# Patient Record
Sex: FEMALE | Race: WHITE | NOT HISPANIC OR LATINO | Employment: FULL TIME | URBAN - METROPOLITAN AREA
[De-identification: names, ages, dates, MRNs, and addresses within clinical notes are randomized per-mention and may not be internally consistent; named-entity substitution may affect disease eponyms.]

---

## 2018-01-10 ENCOUNTER — HOSPITAL ENCOUNTER (OUTPATIENT)
Dept: PREADMISSION TESTING | Facility: OTHER | Age: 48
Discharge: HOME | End: 2018-01-10
Attending: ORTHOPAEDIC SURGERY
Payer: COMMERCIAL

## 2018-01-10 ENCOUNTER — ANESTHESIA EVENT (OUTPATIENT)
Dept: SURGERY | Facility: OTHER | Age: 48
End: 2018-01-10
Payer: COMMERCIAL

## 2018-01-10 VITALS
BODY MASS INDEX: 33.34 KG/M2 | WEIGHT: 220 LBS | HEIGHT: 68 IN | DIASTOLIC BLOOD PRESSURE: 78 MMHG | TEMPERATURE: 98 F | SYSTOLIC BLOOD PRESSURE: 115 MMHG | OXYGEN SATURATION: 98 % | HEART RATE: 63 BPM

## 2018-01-10 RX ORDER — OXYCODONE AND ACETAMINOPHEN 5; 325 MG/1; MG/1
1 TABLET ORAL EVERY 4 HOURS PRN
Status: ON HOLD | COMMUNITY
End: 2018-01-12 | Stop reason: HOSPADM

## 2018-01-10 RX ORDER — ESCITALOPRAM OXALATE 20 MG/1
20 TABLET ORAL DAILY
COMMUNITY
End: 2018-11-13 | Stop reason: SDUPTHER

## 2018-01-10 RX ORDER — LISDEXAMFETAMINE DIMESYLATE 30 MG/1
30 CAPSULE ORAL EVERY MORNING
COMMUNITY
End: 2018-12-11

## 2018-01-10 RX ORDER — OXYCODONE HYDROCHLORIDE 5 MG/1
5 TABLET ORAL ONCE AS NEEDED
Status: CANCELLED | OUTPATIENT
Start: 2018-01-10 | End: 2018-01-10

## 2018-01-10 RX ORDER — SODIUM CHLORIDE, SODIUM LACTATE, POTASSIUM CHLORIDE, CALCIUM CHLORIDE 600; 310; 30; 20 MG/100ML; MG/100ML; MG/100ML; MG/100ML
INJECTION, SOLUTION INTRAVENOUS CONTINUOUS
Status: CANCELLED | OUTPATIENT
Start: 2018-01-10

## 2018-01-10 RX ORDER — FAMOTIDINE 20 MG/1
20 TABLET, FILM COATED ORAL
Status: CANCELLED | OUTPATIENT
Start: 2018-01-10 | End: 2018-01-10

## 2018-01-10 RX ORDER — LIDOCAINE HYDROCHLORIDE 10 MG/ML
0.5 INJECTION, SOLUTION EPIDURAL; INFILTRATION; INTRACAUDAL; PERINEURAL ONCE
Status: CANCELLED | OUTPATIENT
Start: 2018-01-10 | End: 2018-01-10

## 2018-01-10 RX ORDER — PREGABALIN 75 MG/1
150 CAPSULE ORAL ONCE
Status: DISCONTINUED | OUTPATIENT
Start: 2018-01-12 | End: 2018-01-11 | Stop reason: HOSPADM

## 2018-01-10 RX ORDER — MIDAZOLAM HYDROCHLORIDE 5 MG/ML
4 INJECTION INTRAMUSCULAR; INTRAVENOUS
Status: CANCELLED | OUTPATIENT
Start: 2018-01-10

## 2018-01-10 NOTE — ANESTHESIA PREPROCEDURE EVALUATION
01/10/2018  Hayley Montana is a 47 y.o., female.    Anesthesia Evaluation    I have reviewed the Patient Summary Reports.    I have reviewed the Nursing Notes.   I have reviewed the Medications.     Review of Systems  Anesthesia Hx:  No problems with previous Anesthesia Denies Hx of Anesthetic complications  Denies Family Hx of Anesthesia complications.   Denies Personal Hx of Anesthesia complications.   Social:  Non-Smoker    Hematology/Oncology:  Hematology Normal   Oncology Normal     EENT/Dental:EENT/Dental Normal   Cardiovascular:  Cardiovascular Normal     Pulmonary:  Pulmonary Normal    Renal/:  Renal/ Normal     Hepatic/GI:  Hepatic/GI Normal    Musculoskeletal:  Musculoskeletal Normal    Neurological:  Neurology Normal    Endocrine:  Endocrine Normal    Dermatological:  Skin Normal    Psych:  Psychiatric Normal           Physical Exam  General:  Well nourished    Airway/Jaw/Neck:  Airway Findings: Mouth Opening: Normal Mallampati: II      Dental:  Dental Findings: In tact        Mental Status:  Mental Status Findings:  Cooperative, Alert and Oriented         Anesthesia Plan  Type of Anesthesia, risks & benefits discussed:  Anesthesia Type:  general  Patient's Preference:   Intra-op Monitoring Plan: standard ASA monitors  Intra-op Monitoring Plan Comments:   Post Op Pain Control Plan: multimodal analgesia and peripheral nerve block  Post Op Pain Control Plan Comments:   Induction:   IV  Beta Blocker:         Informed Consent: Patient understands risks and agrees with Anesthesia plan.  Questions answered. Anesthesia consent signed with patient.  ASA Score: 1     Day of Surgery Review of History & Physical:    H&P update referred to the surgeon.     Anesthesia Plan Notes: Peripheral nerve block for post op pain management discussed        Ready For Surgery From Anesthesia Perspective.

## 2018-01-10 NOTE — DISCHARGE INSTRUCTIONS
PRE-ADMIT TESTING -  [phone]    [address]  [facility]          Your surgery has been scheduled at [facility]. We are pleased to have the opportunity to serve you. For Further Information please call [phone].    On the day of surgery please report to the Information Desk on the 1st floor.    · CONTACT YOUR PHYSICIAN'S OFFICE THE DAY PRIOR TO YOUR SURGERY TO OBTAIN YOUR ARRIVAL TIME.     · The evening before surgery do not eat anything after 9 p.m. ( this includes hard candy, chewing gum and mints).  You may only have GATORADE, POWERADE AND WATER  from 9 p.m. until you leave your home.   DO NOT DRINK ANY LIQUIDS ON THE WAY TO THE HOSPITAL.      SPECIAL MEDICATION INSTRUCTIONS: TAKE medications checked off by the Anesthesiologist on your Medication List.    Angiogram Patients: Take medications as instructed by your physician, including aspirin.     Surgery Patients:    If you take ASPIRIN - Your PHYSICIAN/SURGEON will need to inform you IF/OR when you need to stop taking aspirin prior to your surgery.     Do Not take any medications containing IBUPROFEN.  Do Not Wear any make-up or dark nail polish   (especially eye make-up) to surgery. If you come to surgery with makeup on you will be required to remove the makeup or nail polish.    Do not shave your surgical area at least 5 days prior to your surgery. The surgical prep will be performed at the hospital according to Infection Control regulations.    Leave all valuables at home.   Do Not wear any jewelry or watches, including any metal in body piercings.  Contact Lens must be removed before surgery. Either do not wear the contact lens or bring a case and solution for storage.  Please bring a container for eyeglasses or dentures as required.  Bring any paperwork your physician has provided, such as consent forms,  history and physicals, doctor's orders, etc.   Bring comfortable clothes that are loose fitting to wear upon  discharge. Take into consideration the type of surgery being performed.  Maintain your diet as advised per your physician the day prior to surgery.      Adequate rest the night before surgery is advised.   Park in the Parking lot behind the hospital or in the [place] [facility] across the street from the parking lot. Parking is complimentary.  If you will be discharged the same day as your procedure, please arrange for a responsible adult to drive you home or to accompany you if traveling by taxi.   YOU WILL NOT BE PERMITTED TO DRIVE OR TO LEAVE THE HOSPITAL ALONE AFTER SURGERY.   It is strongly recommended that you arrange for someone to remain with you for the first 24 hrs following your surgery.       Thank you for your cooperation.  The Staff of [facility].        Bathing Instructions                                                                 Please shower the evening before and morning of your procedure with    ANTIBACTERIAL SOAP. ( DIAL, etc )  Concentrate on the surgical area   for at least 3 minutes and rinse completely. Dry off as usual.   Do not use any deodorant, powder, body lotions, perfume, after shave or    cologne.

## 2018-01-12 ENCOUNTER — ANESTHESIA (OUTPATIENT)
Dept: SURGERY | Facility: OTHER | Age: 48
End: 2018-01-12
Payer: COMMERCIAL

## 2018-01-12 ENCOUNTER — HOSPITAL ENCOUNTER (OUTPATIENT)
Facility: OTHER | Age: 48
Discharge: HOME | End: 2018-01-12
Attending: ORTHOPAEDIC SURGERY | Admitting: ORTHOPAEDIC SURGERY
Payer: COMMERCIAL

## 2018-01-12 ENCOUNTER — SURGERY (OUTPATIENT)
Age: 48
End: 2018-01-12

## 2018-01-12 VITALS
SYSTOLIC BLOOD PRESSURE: 118 MMHG | BODY MASS INDEX: 33.34 KG/M2 | HEIGHT: 68 IN | OXYGEN SATURATION: 98 % | DIASTOLIC BLOOD PRESSURE: 80 MMHG | TEMPERATURE: 98 F | HEART RATE: 74 BPM | RESPIRATION RATE: 18 BRPM | WEIGHT: 220 LBS

## 2018-01-12 DIAGNOSIS — M75.121 COMPLETE ROTATOR CUFF TEAR OR RUPTURE OF RIGHT SHOULDER, NOT SPECIFIED AS TRAUMATIC: Primary | ICD-10-CM

## 2018-01-12 PROCEDURE — C1889 IMPLANT/INSERT DEVICE, NOC: HCPCS | Performed by: ORTHOPAEDIC SURGERY

## 2018-01-12 PROCEDURE — C1713 ANCHOR/SCREW BN/BN,TIS/BN: HCPCS | Performed by: ORTHOPAEDIC SURGERY

## 2018-01-12 PROCEDURE — 25000003 PHARM REV CODE 250: Performed by: ANESTHESIOLOGY

## 2018-01-12 PROCEDURE — C1729 CATH, DRAINAGE: HCPCS | Performed by: ORTHOPAEDIC SURGERY

## 2018-01-12 PROCEDURE — 37000008 HC ANESTHESIA 1ST 15 MINUTES: Performed by: ORTHOPAEDIC SURGERY

## 2018-01-12 PROCEDURE — 63600175 PHARM REV CODE 636 W HCPCS: Performed by: ANESTHESIOLOGY

## 2018-01-12 PROCEDURE — 71000015 HC POSTOP RECOV 1ST HR: Performed by: ORTHOPAEDIC SURGERY

## 2018-01-12 PROCEDURE — 71000033 HC RECOVERY, INTIAL HOUR: Performed by: ORTHOPAEDIC SURGERY

## 2018-01-12 PROCEDURE — 37000009 HC ANESTHESIA EA ADD 15 MINS: Performed by: ORTHOPAEDIC SURGERY

## 2018-01-12 PROCEDURE — 63600175 PHARM REV CODE 636 W HCPCS: Performed by: ORTHOPAEDIC SURGERY

## 2018-01-12 PROCEDURE — S0077 INJECTION, CLINDAMYCIN PHOSP: HCPCS | Performed by: ORTHOPAEDIC SURGERY

## 2018-01-12 PROCEDURE — 25000003 PHARM REV CODE 250: Performed by: ORTHOPAEDIC SURGERY

## 2018-01-12 PROCEDURE — 63600175 PHARM REV CODE 636 W HCPCS: Performed by: NURSE ANESTHETIST, CERTIFIED REGISTERED

## 2018-01-12 PROCEDURE — 36000710: Performed by: ORTHOPAEDIC SURGERY

## 2018-01-12 PROCEDURE — 25000003 PHARM REV CODE 250: Performed by: NURSE ANESTHETIST, CERTIFIED REGISTERED

## 2018-01-12 PROCEDURE — 36000711: Performed by: ORTHOPAEDIC SURGERY

## 2018-01-12 PROCEDURE — 71000039 HC RECOVERY, EACH ADD'L HOUR: Performed by: ORTHOPAEDIC SURGERY

## 2018-01-12 PROCEDURE — 27201423 OPTIME MED/SURG SUP & DEVICES STERILE SUPPLY: Performed by: ORTHOPAEDIC SURGERY

## 2018-01-12 DEVICE — IMPLANT ARTHSCP BIOINDUCTV MED: Type: IMPLANTABLE DEVICE | Site: SHOULDER | Status: FUNCTIONAL

## 2018-01-12 DEVICE — KIT STAPLES BONE AND TENDON: Type: IMPLANTABLE DEVICE | Site: SHOULDER | Status: FUNCTIONAL

## 2018-01-12 DEVICE — ANCHOR SUT PEEK 4.75X19.1MM: Type: IMPLANTABLE DEVICE | Site: SHOULDER | Status: FUNCTIONAL

## 2018-01-12 DEVICE — ANCHOR 4.5 BIO COM TT: Type: IMPLANTABLE DEVICE | Site: SHOULDER | Status: FUNCTIONAL

## 2018-01-12 DEVICE — ANCHOR BIOCOMP W/3 SUTURES: Type: IMPLANTABLE DEVICE | Site: SHOULDER | Status: FUNCTIONAL

## 2018-01-12 RX ORDER — CLINDAMYCIN PHOSPHATE 900 MG/50ML
900 INJECTION, SOLUTION INTRAVENOUS
Status: COMPLETED | OUTPATIENT
Start: 2018-01-12 | End: 2018-01-12

## 2018-01-12 RX ORDER — LIDOCAINE HCL/PF 100 MG/5ML
SYRINGE (ML) INTRAVENOUS
Status: DISCONTINUED | OUTPATIENT
Start: 2018-01-12 | End: 2018-01-12

## 2018-01-12 RX ORDER — PHENYLEPHRINE HYDROCHLORIDE 10 MG/ML
INJECTION INTRAVENOUS
Status: DISCONTINUED | OUTPATIENT
Start: 2018-01-12 | End: 2018-01-12

## 2018-01-12 RX ORDER — ONDANSETRON 4 MG/1
8 TABLET, ORALLY DISINTEGRATING ORAL EVERY 8 HOURS PRN
Qty: 10 TABLET | Refills: 1 | Status: SHIPPED | OUTPATIENT
Start: 2018-01-12 | End: 2018-03-26 | Stop reason: CLARIF

## 2018-01-12 RX ORDER — MIDAZOLAM HYDROCHLORIDE 5 MG/ML
4 INJECTION INTRAMUSCULAR; INTRAVENOUS
Status: DISCONTINUED | OUTPATIENT
Start: 2018-01-12 | End: 2018-01-12 | Stop reason: HOSPADM

## 2018-01-12 RX ORDER — HYDROMORPHONE HYDROCHLORIDE 2 MG/ML
0.4 INJECTION, SOLUTION INTRAMUSCULAR; INTRAVENOUS; SUBCUTANEOUS EVERY 5 MIN PRN
Status: DISCONTINUED | OUTPATIENT
Start: 2018-01-12 | End: 2018-01-12 | Stop reason: HOSPADM

## 2018-01-12 RX ORDER — FENTANYL CITRATE 50 UG/ML
25 INJECTION, SOLUTION INTRAMUSCULAR; INTRAVENOUS EVERY 5 MIN PRN
Status: DISCONTINUED | OUTPATIENT
Start: 2018-01-12 | End: 2018-01-12 | Stop reason: HOSPADM

## 2018-01-12 RX ORDER — OXYCODONE HYDROCHLORIDE 5 MG/1
5 TABLET ORAL
Status: DISCONTINUED | OUTPATIENT
Start: 2018-01-12 | End: 2018-01-12 | Stop reason: HOSPADM

## 2018-01-12 RX ORDER — FENTANYL CITRATE 50 UG/ML
100 INJECTION, SOLUTION INTRAMUSCULAR; INTRAVENOUS EVERY 5 MIN PRN
Status: DISCONTINUED | OUTPATIENT
Start: 2018-01-12 | End: 2018-01-12 | Stop reason: HOSPADM

## 2018-01-12 RX ORDER — ACETAMINOPHEN 10 MG/ML
INJECTION, SOLUTION INTRAVENOUS
Status: DISCONTINUED | OUTPATIENT
Start: 2018-01-12 | End: 2018-01-12

## 2018-01-12 RX ORDER — MEPERIDINE HYDROCHLORIDE 50 MG/ML
12.5 INJECTION INTRAMUSCULAR; INTRAVENOUS; SUBCUTANEOUS ONCE AS NEEDED
Status: COMPLETED | OUTPATIENT
Start: 2018-01-12 | End: 2018-01-12

## 2018-01-12 RX ORDER — SODIUM CHLORIDE, SODIUM LACTATE, POTASSIUM CHLORIDE, CALCIUM CHLORIDE 600; 310; 30; 20 MG/100ML; MG/100ML; MG/100ML; MG/100ML
INJECTION, SOLUTION INTRAVENOUS CONTINUOUS
Status: DISCONTINUED | OUTPATIENT
Start: 2018-01-12 | End: 2018-01-12 | Stop reason: HOSPADM

## 2018-01-12 RX ORDER — LIDOCAINE HYDROCHLORIDE 10 MG/ML
0.5 INJECTION, SOLUTION EPIDURAL; INFILTRATION; INTRACAUDAL; PERINEURAL ONCE
Status: DISCONTINUED | OUTPATIENT
Start: 2018-01-12 | End: 2018-01-12 | Stop reason: HOSPADM

## 2018-01-12 RX ORDER — FAMOTIDINE 20 MG/1
20 TABLET, FILM COATED ORAL
Status: COMPLETED | OUTPATIENT
Start: 2018-01-12 | End: 2018-01-12

## 2018-01-12 RX ORDER — SODIUM CHLORIDE 0.9 % (FLUSH) 0.9 %
3 SYRINGE (ML) INJECTION
Status: DISCONTINUED | OUTPATIENT
Start: 2018-01-12 | End: 2018-01-12 | Stop reason: HOSPADM

## 2018-01-12 RX ORDER — ONDANSETRON 2 MG/ML
4 INJECTION INTRAMUSCULAR; INTRAVENOUS DAILY PRN
Status: DISCONTINUED | OUTPATIENT
Start: 2018-01-12 | End: 2018-01-12 | Stop reason: HOSPADM

## 2018-01-12 RX ORDER — OXYCODONE HYDROCHLORIDE 5 MG/1
10 TABLET ORAL EVERY 4 HOURS PRN
Status: DISCONTINUED | OUTPATIENT
Start: 2018-01-12 | End: 2018-01-12 | Stop reason: HOSPADM

## 2018-01-12 RX ORDER — ONDANSETRON 2 MG/ML
4 INJECTION INTRAMUSCULAR; INTRAVENOUS EVERY 12 HOURS PRN
Status: DISCONTINUED | OUTPATIENT
Start: 2018-01-12 | End: 2018-01-12 | Stop reason: HOSPADM

## 2018-01-12 RX ORDER — EPINEPHRINE 1 MG/ML
INJECTION, SOLUTION INTRACARDIAC; INTRAMUSCULAR; INTRAVENOUS; SUBCUTANEOUS
Status: DISCONTINUED | OUTPATIENT
Start: 2018-01-12 | End: 2018-01-12 | Stop reason: HOSPADM

## 2018-01-12 RX ORDER — OXYCODONE AND ACETAMINOPHEN 10; 325 MG/1; MG/1
1 TABLET ORAL
Qty: 50 TABLET | Refills: 0 | Status: SHIPPED | OUTPATIENT
Start: 2018-01-12 | End: 2018-11-07

## 2018-01-12 RX ORDER — HYDROCODONE BITARTRATE AND ACETAMINOPHEN 5; 325 MG/1; MG/1
1 TABLET ORAL EVERY 4 HOURS PRN
Status: DISCONTINUED | OUTPATIENT
Start: 2018-01-12 | End: 2018-01-12 | Stop reason: HOSPADM

## 2018-01-12 RX ORDER — PROPOFOL 10 MG/ML
VIAL (ML) INTRAVENOUS
Status: DISCONTINUED | OUTPATIENT
Start: 2018-01-12 | End: 2018-01-12

## 2018-01-12 RX ORDER — MIDAZOLAM HYDROCHLORIDE 1 MG/ML
2 INJECTION INTRAMUSCULAR; INTRAVENOUS EVERY 5 MIN PRN
Status: DISCONTINUED | OUTPATIENT
Start: 2018-01-12 | End: 2018-01-12 | Stop reason: HOSPADM

## 2018-01-12 RX ORDER — ROPIVACAINE HYDROCHLORIDE 5 MG/ML
INJECTION, SOLUTION EPIDURAL; INFILTRATION; PERINEURAL
Status: DISCONTINUED | OUTPATIENT
Start: 2018-01-12 | End: 2018-01-12

## 2018-01-12 RX ORDER — ROCURONIUM BROMIDE 10 MG/ML
INJECTION, SOLUTION INTRAVENOUS
Status: DISCONTINUED | OUTPATIENT
Start: 2018-01-12 | End: 2018-01-12

## 2018-01-12 RX ORDER — DEXAMETHASONE SODIUM PHOSPHATE 4 MG/ML
INJECTION, SOLUTION INTRA-ARTICULAR; INTRALESIONAL; INTRAMUSCULAR; INTRAVENOUS; SOFT TISSUE
Status: DISCONTINUED | OUTPATIENT
Start: 2018-01-12 | End: 2018-01-12

## 2018-01-12 RX ORDER — OXYCODONE HYDROCHLORIDE 5 MG/1
5 TABLET ORAL ONCE AS NEEDED
Status: DISCONTINUED | OUTPATIENT
Start: 2018-01-12 | End: 2018-01-12 | Stop reason: HOSPADM

## 2018-01-12 RX ORDER — FENTANYL CITRATE 50 UG/ML
100 INJECTION, SOLUTION INTRAMUSCULAR; INTRAVENOUS EVERY 5 MIN PRN
Status: COMPLETED | OUTPATIENT
Start: 2018-01-12 | End: 2018-01-12

## 2018-01-12 RX ORDER — MEPERIDINE HYDROCHLORIDE 50 MG/ML
12.5 INJECTION INTRAMUSCULAR; INTRAVENOUS; SUBCUTANEOUS ONCE AS NEEDED
Status: DISCONTINUED | OUTPATIENT
Start: 2018-01-12 | End: 2018-01-12 | Stop reason: HOSPADM

## 2018-01-12 RX ORDER — SODIUM CHLORIDE 0.9 % (FLUSH) 0.9 %
5 SYRINGE (ML) INJECTION
Status: DISCONTINUED | OUTPATIENT
Start: 2018-01-12 | End: 2018-01-12 | Stop reason: HOSPADM

## 2018-01-12 RX ADMIN — LIDOCAINE HYDROCHLORIDE 25 MG: 20 INJECTION, SOLUTION INTRAVENOUS at 12:01

## 2018-01-12 RX ADMIN — PHENYLEPHRINE HYDROCHLORIDE 100 MCG: 10 INJECTION INTRAVENOUS at 12:01

## 2018-01-12 RX ADMIN — PROPOFOL 200 MG: 10 INJECTION, EMULSION INTRAVENOUS at 10:01

## 2018-01-12 RX ADMIN — CLINDAMYCIN PHOSPHATE 900 MG: 18 INJECTION, SOLUTION INTRAVENOUS at 10:01

## 2018-01-12 RX ADMIN — PHENYLEPHRINE HYDROCHLORIDE 100 MCG: 10 INJECTION INTRAVENOUS at 11:01

## 2018-01-12 RX ADMIN — MEPERIDINE HYDROCHLORIDE 12.5 MG: 50 INJECTION INTRAMUSCULAR; INTRAVENOUS; SUBCUTANEOUS at 01:01

## 2018-01-12 RX ADMIN — FAMOTIDINE 20 MG: 20 TABLET, FILM COATED ORAL at 07:01

## 2018-01-12 RX ADMIN — ROPIVACAINE HYDROCHLORIDE 20 ML: 5 INJECTION, SOLUTION EPIDURAL; INFILTRATION; PERINEURAL at 10:01

## 2018-01-12 RX ADMIN — CARBOXYMETHYLCELLULOSE SODIUM 2 DROP: 2.5 SOLUTION/ DROPS OPHTHALMIC at 10:01

## 2018-01-12 RX ADMIN — PHENYLEPHRINE HYDROCHLORIDE 200 MCG: 10 INJECTION INTRAVENOUS at 11:01

## 2018-01-12 RX ADMIN — MIDAZOLAM HYDROCHLORIDE 2 MG: 1 INJECTION INTRAMUSCULAR; INTRAVENOUS at 10:01

## 2018-01-12 RX ADMIN — ACETAMINOPHEN 1000 MG: 10 INJECTION, SOLUTION INTRAVENOUS at 10:01

## 2018-01-12 RX ADMIN — PHENYLEPHRINE HYDROCHLORIDE 200 MCG: 10 INJECTION INTRAVENOUS at 10:01

## 2018-01-12 RX ADMIN — MIDAZOLAM HYDROCHLORIDE 4 MG: 5 INJECTION, SOLUTION INTRAMUSCULAR; INTRAVENOUS at 07:01

## 2018-01-12 RX ADMIN — SODIUM CHLORIDE, SODIUM LACTATE, POTASSIUM CHLORIDE, AND CALCIUM CHLORIDE: 600; 310; 30; 20 INJECTION, SOLUTION INTRAVENOUS at 10:01

## 2018-01-12 RX ADMIN — ROCURONIUM BROMIDE 5 MG: 10 INJECTION, SOLUTION INTRAVENOUS at 12:01

## 2018-01-12 RX ADMIN — LIDOCAINE HYDROCHLORIDE 75 MG: 20 INJECTION, SOLUTION INTRAVENOUS at 10:01

## 2018-01-12 RX ADMIN — FENTANYL CITRATE 100 MCG: 50 INJECTION, SOLUTION INTRAMUSCULAR; INTRAVENOUS at 10:01

## 2018-01-12 RX ADMIN — SODIUM CHLORIDE, SODIUM LACTATE, POTASSIUM CHLORIDE, AND CALCIUM CHLORIDE: 600; 310; 30; 20 INJECTION, SOLUTION INTRAVENOUS at 11:01

## 2018-01-12 RX ADMIN — EPINEPHRINE 30 ML: 1 INJECTION, SOLUTION INTRAMUSCULAR; SUBCUTANEOUS at 11:01

## 2018-01-12 RX ADMIN — ROCURONIUM BROMIDE 40 MG: 10 INJECTION, SOLUTION INTRAVENOUS at 10:01

## 2018-01-12 RX ADMIN — DEXAMETHASONE SODIUM PHOSPHATE 8 MG: 4 INJECTION, SOLUTION INTRAMUSCULAR; INTRAVENOUS at 10:01

## 2018-01-12 NOTE — OR NURSING
Assisted up to bathroom, voided without difficulty, assisted back to bed, hemovac drain at right shoulder removed as ordered,, assisted to dress for discharge, discharge instructions reviewed with pt and family members

## 2018-01-12 NOTE — BRIEF OP NOTE
[facility]  Brief Operative Note     SUMMARY     Surgery Date: 1/12/2018     Surgeon(s) and Role:     * Nathanael Elena MD - Primary    Assisting Surgeon: None    Pre-op Diagnosis:  Complete rotator cuff tear or rupture of right shoulder, not specified as traumatic [M75.121]  Detachment of glenoid labrum, right, initial encounter [S43.431A]  Biceps tendinitis on right [M75.21]    Post-op Diagnosis:  Post-Op Diagnosis Codes:     * Complete rotator cuff tear or rupture of right shoulder, not specified as traumatic [M75.121]     * Detachment of glenoid labrum, right, initial encounter [S43.431A]     * Biceps tendinitis on right [M75.21]    Procedure(s) (LRB):  ARTHROSCOPY-SHOULDER (Right)  ARTHROSCOPY-SHOULDER WITH SUBACROMIAL DECOMPRESSION W/ ALLOGRAFT AUGMENTATION (Right)  REPAIR-ROTATOR CUFF (Right)  REPAIR-TENDON-BICEP BICEPS TENOTOMY ARTHROSCOPY (Right)    Anesthesia: General + Regional    Description of the findings of the procedure: massive RTC tear    Findings/Key Components: ATS SAD/RCR/biceps tenotomy    Estimated Blood Loss: 5cc         Specimens:   Specimen (12h ago through future)    None          Discharge Note    SUMMARY     Admit Date: 1/12/2018    Discharge Date and Time:  01/12/2018 12:50 PM    Hospital Course (synopsis of major diagnoses, care, treatment, and services provided during the course of the hospital stay): outpt     Final Diagnosis: Post-Op Diagnosis Codes:     * Complete rotator cuff tear or rupture of right shoulder, not specified as traumatic [M75.121]     * Detachment of glenoid labrum, right, initial encounter [S43.431A]     * Biceps tendinitis on right [M75.21]    Disposition: Home or Self Care    Follow Up/Patient Instructions:     Medications:  Reconciled Home Medications:   Current Discharge Medication List      START taking these medications    Details   ondansetron (ZOFRAN-ODT) 4 MG TbDL Take 2 tablets (8 mg total) by mouth every 8 (eight) hours as  needed.  Qty: 10 tablet, Refills: 1      oxyCODONE-acetaminophen (PERCOCET)  mg per tablet Take 1 tablet by mouth every 4 to 6 hours as needed.  Qty: 50 tablet, Refills: 0         CONTINUE these medications which have NOT CHANGED    Details   escitalopram oxalate (LEXAPRO) 20 MG tablet Take 20 mg by mouth once daily.      lisdexamfetamine (VYVANSE) 30 MG capsule Take 30 mg by mouth every morning.         STOP taking these medications       oxyCODONE-acetaminophen (PERCOCET) 5-325 mg per tablet Comments:   Reason for Stopping:               Discharge Procedure Orders  Diet general     Call MD for:  temperature >100.4     Call MD for:  persistent nausea and vomiting     Call MD for:  severe uncontrolled pain     Call MD for:  difficulty breathing, headache or visual disturbances     Call MD for:  redness, tenderness, or signs of infection (pain, swelling, redness, odor or green/yellow discharge around incision site)     Call MD for:  hives     Call MD for:  persistent dizziness or light-headedness     Call MD for:  extreme fatigue     Ice to affected area     Lifting restrictions     Change dressing (specify)   Order Comments: Dressing change first in PT, then daily       Follow-up Information     Nathanael Freeman MD In 10 days.    Specialty:  Orthopedic Surgery  Why:  For suture removal, For wound re-check  Contact information:  [address] [place] [address] [phone]

## 2018-01-12 NOTE — PLAN OF CARE
Hayley Torres Sánchezjaiden has met all discharge criteria from Phase II. Vital Signs are stable, ambulating  without difficulty. Discharge instructions given, patient verbalized understanding. Discharged from facility via wheelchair in stable condition.

## 2018-01-12 NOTE — ANESTHESIA POSTPROCEDURE EVALUATION
"Anesthesia Post Evaluation    Patient: Hayley Montana    Procedure(s) Performed: Procedure(s) (LRB):  ARTHROSCOPY-SHOULDER (Right)  ARTHROSCOPY-SHOULDER WITH SUBACROMIAL DECOMPRESSION W/ ALLOGRAFT AUGMENTATION (Right)  REPAIR-ROTATOR CUFF (Right)  REPAIR-TENDON-BICEP BICEPS TENOTOMY ARTHROSCOPY (Right)    Final Anesthesia Type: general  Patient location during evaluation: PACU  Patient participation: Yes- Able to Participate  Level of consciousness: awake and alert  Post-procedure vital signs: reviewed and stable  Pain management: adequate  Airway patency: patent  PONV status at discharge: No PONV  Anesthetic complications: no      Cardiovascular status: blood pressure returned to baseline  Respiratory status: unassisted  Hydration status: euvolemic  Follow-up not needed.        Visit Vitals  /75   Pulse 67   Temp 36.4 °C (97.6 °F) (Oral)   Resp 18   Ht 5' 8" (1.727 m)   Wt 99.8 kg (220 lb)   SpO2 100%   BMI 33.45 kg/m²       Pain/Doug Score: Pain Assessment Performed: Yes (1/12/2018  7:47 AM)  Presence of Pain: complains of pain/discomfort (1/12/2018  7:47 AM)      "

## 2018-01-12 NOTE — ANESTHESIA POSTPROCEDURE EVALUATION
"Anesthesia Post Evaluation    Patient: Hayley Montana    Procedure(s) Performed: Procedure(s) (LRB):  ARTHROSCOPY-SHOULDER (Right)  ARTHROSCOPY-SHOULDER WITH SUBACROMIAL DECOMPRESSION W/ ALLOGRAFT AUGMENTATION (Right)  REPAIR-ROTATOR CUFF (Right)  REPAIR-TENDON-BICEP BICEPS TENOTOMY ARTHROSCOPY (Right)    Final Anesthesia Type: general  Patient location during evaluation: PACU  Patient participation: Yes- Able to Participate  Level of consciousness: awake and alert  Post-procedure vital signs: reviewed and stable  Pain management: adequate  Airway patency: patent  PONV status at discharge: No PONV  Anesthetic complications: no      Cardiovascular status: blood pressure returned to baseline  Respiratory status: unassisted  Hydration status: euvolemic  Follow-up not needed.        Visit Vitals  /79   Pulse 71   Temp 36.7 °C (98 °F)   Resp 18   Ht 5' 8" (1.727 m)   Wt 99.8 kg (220 lb)   SpO2 95%   BMI 33.45 kg/m²       Pain/Doug Score: Pain Assessment Performed: Yes (1/12/2018  3:00 PM)  Presence of Pain: denies (1/12/2018  3:00 PM)  Doug Score: 10 (1/12/2018  3:00 PM)  Modified Doug Score: 20 (1/12/2018  2:35 PM)      "

## 2018-01-12 NOTE — TRANSFER OF CARE
"Anesthesia Transfer of Care Note    Patient: Hayley Montana    Procedure(s) Performed: Procedure(s) (LRB):  ARTHROSCOPY-SHOULDER (Right)  ARTHROSCOPY-SHOULDER WITH SUBACROMIAL DECOMPRESSION W/ ALLOGRAFT AUGMENTATION (Right)  REPAIR-ROTATOR CUFF (Right)  REPAIR-TENDON-BICEP BICEPS TENOTOMY ARTHROSCOPY (Right)    Patient location: PACU    Anesthesia Type: general    Transport from OR: Transported from OR on 2-3 L/min O2 by NC with adequate spontaneous ventilation    Post pain: adequate analgesia    Post assessment: no apparent anesthetic complications    Post vital signs: stable    Level of consciousness: awake    Nausea/Vomiting: no nausea/vomiting    Complications: none    Transfer of care protocol was followed      Last vitals:   Visit Vitals  /83   Pulse (!) 55   Temp 36.3 °C (97.4 °F) (Oral)   Resp 16   Ht 5' 8" (1.727 m)   Wt 99.8 kg (220 lb)   SpO2 99%   BMI 33.45 kg/m²     "

## 2018-01-12 NOTE — ANESTHESIA PROCEDURE NOTES
Peripheral    Patient location during procedure: holding area   Block not for primary anesthetic.  Reason for block: at surgeon's request and post-op pain management   Post-op Pain Location: shoulder pain  Timeout: 1/12/2018 10:05 AM   End time: 1/12/2018 10:12 AM  Staffing  Anesthesiologist: OSCAR BAJWA  Preanesthetic Checklist  Completed: patient identified, site marked, surgical consent, pre-op evaluation, timeout performed, IV checked, risks and benefits discussed and monitors and equipment checked  Peripheral Block  Patient position: left lateral decubitus  Prep: ChloraPrep  Patient monitoring: heart rate, cardiac monitor, continuous pulse ox and frequent blood pressure checks  Laterality: right  Injection technique: single shot  Needle  Needle gauge: 22 G  Needle length: 3.5 in  Needle localization: nerve stimulator and ultrasound guidance   -ultrasound image captured on disc.  Assessment  Injection assessment: local visualized surrounding nerve, negative parasthesia and negative aspiration  Paresthesia pain: none  Heart rate change: no  Slow fractionated injection: yes  Medications:  Bolus administered: 20 mL of 0.5 ropivacaine  Epinephrine added: none

## 2018-01-12 NOTE — DISCHARGE INSTRUCTIONS
Shoulder Arthroscopy Post-Op Instructions                                       Dr. Nathanael Elena    1. Sling/Brace- You should wear the brace until the first post-op visit. You can take the sling off to shower but keep your arm at your side.  Do not lift your arm away from your body unless told otherwise.  I will give you/your family specific instructions about the length of brace wear.    2. Bandage- If you have physical therapy set up they will remove the bandage. Otherwise you can remove it in 3 days. Keep the incisions clean, dry and covered. Do not get it wet until you see me.     3. Ice- Use the polar care as much as you want. Make sure there are clothes or a towel between the cooling unit and your skin.     4. Exercise- If you have PT set up, they will instruct you on exercises to do. If you do not, it is OK to lean your arm over and make circles with your hand pointing to the floor (called Pendulum exercises). Do not lift or grasp anything away from the body until you see me. It is OK to take your arm out of the sling and extend your elbow as long as your elbow is at your side.     5. Medications- You will be given pain and nausea prescriptions to go home. Take the medications as written.  Call the office if you are running low with at least 2-3 days notice to give us time to refill it.  We also recommend taking a baby aspirin for 2 weeks after surgery to decrease the (very,very low) risk of blood clot formation.    6. Bathing- Do not get your shoulder wet until you see me in clinic.    7. Appointment- You should already have your post-op appointment scheduled. If you do not or you can't remember, call the office for more information.         Discharge Instructions: After Your Surgery  Youve just had surgery. During surgery, you were given medicine called anesthesia to keep you relaxed and free of pain. After surgery, you may have some pain or nausea. This is common. Here are some tips for feeling  better and getting well after surgery.     Stay on schedule with your medicine.     Going home  Your healthcare provider will show you how to take care of yourself when you go home. He or she will also answer your questions. Have an adult family member or friend drive you home. For the first 24 hours after your surgery:    · Do not drive or use heavy equipment.  · Do not make important decisions or sign legal papers.  · Do not drink alcohol.  · Have someone stay with you, if needed. He or she can watch for problems and help keep you safe.    Be sure to go to all follow-up visits with your healthcare provider. And rest after your surgery for as long as your healthcare provider tells you to.    Coping with pain  If you have pain after surgery, pain medicine will help you feel better. Take it as told, before pain becomes severe. Also, ask your healthcare provider or pharmacist about other ways to control pain. This might be with heat, ice, or relaxation. And follow any other instructions your surgeon or nurse gives you.    Tips for taking pain medicine  To get the best relief possible, remember these points:    · Pain medicines can upset your stomach. Taking them with a little food may help.  · Most pain relievers taken by mouth need at least 20 to 30 minutes to start to work.  · Taking medicine on a schedule can help you remember to take it. Try to time your medicine so that you can take it before starting an activity. This might be before you get dressed, go for a walk, or sit down for dinner.  · Constipation is a common side effect of pain medicines. Call your healthcare provider before taking any medicines such as laxatives or stool softeners to help ease constipation. Also ask if you should skip any foods. Drinking lots of fluids and eating foods such as fruits and vegetables that are high in fiber can also help. Remember, do not take laxatives unless your surgeon has prescribed them.  · Drinking alcohol and  taking pain medicine can cause dizziness and slow your breathing. It can even be deadly. Do not drink alcohol while taking pain medicine.  · Pain medicine can make you react more slowly to things. Do not drive or run machinery while taking pain medicine.    Your healthcare provider may tell you to take acetaminophen to help ease your pain. Ask him or her how much you are supposed to take each day. Acetaminophen or other pain relievers may interact with your prescription medicines or other over-the-counter (OTC) medicines. Some prescription medicines have acetaminophen and other ingredients. Using both prescription and OTC acetaminophen for pain can cause you to overdose. Read the labels on your OTC medicines with care. This will help you to clearly know the list of ingredients, how much to take, and any warnings. It may also help you not take too much acetaminophen. If you have questions or do not understand the information, ask your pharmacist or healthcare provider to explain it to you before you take the OTC medicine.    Managing nausea  Some people have an upset stomach after surgery. This is often because of anesthesia, pain, or pain medicine, or the stress of surgery. These tips will help you handle nausea and eat healthy foods as you get better. If you were on a special food plan before surgery, ask your healthcare provider if you should follow it while you get better. These tips may help:    · Do not push yourself to eat. Your body will tell you when to eat and how much.  · Start off with clear liquids and soup. They are easier to digest.  · Next try semi-solid foods, such as mashed potatoes, applesauce, and gelatin, as you feel ready.  · Slowly move to solid foods. Dont eat fatty, rich, or spicy foods at first.  · Do not force yourself to have 3 large meals a day. Instead eat smaller amounts more often.  · Take pain medicines with a small amount of solid food, such as crackers or toast, to avoid  nausea.     Call your surgeon if  · You still have pain an hour after taking medicine. The medicine may not be strong enough.  · You feel too sleepy, dizzy, or groggy. The medicine may be too strong.  · You have side effects like nausea, vomiting, or skin changes, such as rash, itching, or hives.       If you have obstructive sleep apnea  You were given anesthesia medicine during surgery to keep you comfortable and free of pain. After surgery, you may have more apnea spells because of this medicine and other medicines you were given. The spells may last longer than usual.   At home:    · Keep using the continuous positive airway pressure (CPAP) device when you sleep. Unless your healthcare provider tells you not to, use it when you sleep, day or night. CPAP is a common device used to treat obstructive sleep apnea.  · Talk with your provider before taking any pain medicine, muscle relaxants, or sedatives. Your provider will tell you about the possible dangers of taking these medicines.    © 4226-1053 [employer]. [address] [place] [address], [place] [address]. All rights reserved. This information is not intended as a substitute for professional medical care. Always follow your healthcare professional's instructions.    PLEASE FOLLOW ANY OTHER INSTRUCTIONS PROVIDED TO YOU BY DR. DIMAS!

## 2018-01-13 NOTE — OP NOTE
DATE OF PROCEDURE:  01/12/2018    PREOPERATIVE DIAGNOSES:  1.  Right shoulder massive rotator cuff tear.  2.  Right shoulder impingement syndrome.  3.  Right shoulder proximal biceps tendon tear.    POSTOPERATIVE DIAGNOSES:  1.  Right shoulder massive rotator cuff tear.  2.  Right shoulder impingement syndrome.  3.  Right shoulder proximal biceps tendon tear.    PROCEDURES PERFORMED:  1.  Right shoulder arthroscopic rotator cuff repair.  2.  Right shoulder arthroscopic subacromial decompression (acromioplasty, CA        ligament excision, bursectomy).  3.  Right shoulder arthroscopic biceps tenotomy.  4.  Right shoulder augmentation rotator cuff repair with rotation medical        allograft patch.    SURGEON:  Nathanael Elena M.D.    ASSISTANT:  Haleigh Durbin CST.    COMPLICATIONS:  None.    SPECIMENS:  None.    ESTIMATED BLOOD LOSS:  5 mL.    TOURNIQUET TIME:  None.    IMPLANTS USED:  Arthrex 5.5 mm triple-loaded corkscrew anchor x1 and 4.5 mm   double-loaded corkscrew anchor x2 for medial row repair as well as 2 free #2   FiberWire sutures for side-to-side repair in conjunction with two SwiveLock   anchors for lateral repair for double row/transosseous equivalent repair.    POSTOPERATIVE PLAN:  The patient will follow a type 2 rotator cuff repair   protocol.    ANESTHESIA:  General endotracheal anesthesia plus regional.    HISTORY:  Hayley Montana is a 47-year-old female who had an acute right shoulder   rotator cuff tear of skiing.  She had tremendous pain and inability to raise her   arm.  All risks and benefits were discussed at length and informed consent was   obtained for the above procedure.    PROCEDURE IN DETAIL:  Hayley Montana was taken to the Operating Room on   01/12/2018, for planned right shoulder rotator cuff repair.  She was given 900   mg of Cleocin as well as a regional nerve block by the Anesthesia Service within   1 hour of the incision.  She was brought to the Operating Room and placed in   the  supine position.  General endotracheal anesthesia was administered.    Examination under anesthesia revealed no pathologic laxity.  She was then placed   in lateral decubitus position with approximately 12 pounds suspension with a   shoulder suspension device and all bony prominences padded appropriately with a   beanbag and an axillary roll.  Right shoulder was then prepped and draped in the   usual sterile fashion.  A time-out procedure was performed identifying the   right shoulder as the surgical site.    A 30 mL of normal saline was injected into the glenohumeral joint and a standard   posterior portal was established.  This was then followed by an anterior portal   established through the rotator interval with direct visualization.  There was   a high-grade partial thickness tear of the long head of the biceps tendon, just   above its insertion on the superior labrum.  The electrocautery device was then   brought in and a biceps tenotomy was performed.  There was no significant   cartilage damage to the glenoid or humeral head.  There was degenerative labral   fraying circumferentially and the shaver was used to trim this back to a rim.    There was a large complex supraspinatus and infraspinatus tear with delaminated   portion and some tearing at both the musculotendinous junction as well as at the   tendinous insertion on the greater tuberosity.  The subscapularis was   unremarkable.    The scope was then placed into the subacromial space and through a lateral   portal, a thorough bursectomy was performed.  A type 2 acromion was encountered   with a markedly hypertrophic CA ligament.  An electrocautery device was then   used to perform a CA ligament excision and a bur was used to perform an   acromioplasty to a type 1 morphology.  The shaver was used to get all the way   posteriorly to remove a lot of very thickened and inflamed bursal tissue to   better expose the cuff.  The supraspinatus and infraspinatus  again were torn in   a complex manner, there was dehiscence of the capsular tendon junction medially   and then there was some muscle tendon junction tearing as well as tenting at the   insertion.  Through a cannula some free #2 FiberWire sutures were used, both   with a suture lasso as well as the Scorpion in a side-to-side repairs of the   intrasubstance tearing or muscle tendon junction tearing was then performed.    The shaver was then used to decorticate the anatomic insertion of the tendon at   the articular margin for a bleeding bone for good repair.  Next, a triple loaded   5.5 mm corkscrew anchor was placed anteriorly and a double-loaded 4.5 mm   corkscrew anchor was placed posteriorly.  The Scorpion was then used to pass all   sutures in a horizontal mattress fashion.  Thus having 10 sutures through the   tear.  Sliding knots were then tied nicely repairing the tendon back to its   anatomic origin.  The 5 sutures were then placed in one lateral row anchor and   the other 5 were placed in the other lateral row anchor getting the tendinous   tissue to lay down nicely.  Given the poor quality of the tendinous tissue and   the complex tear pattern, the rotation medical allograft patch was then deployed   through the lateral portal and the PLLA staples were used to tack it down.    None of the bone staples were necessary.  The shaver was then brought in to   remove any soft tissue or bony debris.  A drain was then placed to the posterior   portal and the portals were closed with 3-0 Prolene suture.  A soft dressing   was applied followed by a PolarCare unit and an abduction brace.  The patient   was then extubated in the Operating Room and taken to the PACU without   complication.      TIANA  dd: 01/12/2018 13:01:35 (CST)  td: 01/12/2018 18:30:03 (CST)  Doc ID   #[id number]  Job ID #[id number]    CC:

## 2018-03-26 ENCOUNTER — HOSPITAL ENCOUNTER (OUTPATIENT)
Dept: PREADMISSION TESTING | Facility: OTHER | Age: 48
Discharge: HOME | End: 2018-03-26
Attending: ORTHOPAEDIC SURGERY
Payer: COMMERCIAL

## 2018-03-26 ENCOUNTER — ANESTHESIA EVENT (OUTPATIENT)
Dept: SURGERY | Facility: OTHER | Age: 48
End: 2018-03-26
Payer: COMMERCIAL

## 2018-03-26 VITALS
BODY MASS INDEX: 32.58 KG/M2 | HEIGHT: 68 IN | HEART RATE: 70 BPM | OXYGEN SATURATION: 98 % | DIASTOLIC BLOOD PRESSURE: 70 MMHG | SYSTOLIC BLOOD PRESSURE: 140 MMHG | RESPIRATION RATE: 16 BRPM | TEMPERATURE: 99 F | WEIGHT: 215 LBS

## 2018-03-26 RX ORDER — LIDOCAINE HYDROCHLORIDE 10 MG/ML
0.5 INJECTION, SOLUTION EPIDURAL; INFILTRATION; INTRACAUDAL; PERINEURAL ONCE
Status: CANCELLED | OUTPATIENT
Start: 2018-03-26 | End: 2018-03-26

## 2018-03-26 RX ORDER — FAMOTIDINE 20 MG/1
20 TABLET, FILM COATED ORAL
Status: CANCELLED | OUTPATIENT
Start: 2018-03-26 | End: 2018-03-26

## 2018-03-26 RX ORDER — IBUPROFEN 200 MG
400 TABLET ORAL EVERY 6 HOURS PRN
COMMUNITY
End: 2018-11-07

## 2018-03-26 RX ORDER — MIDAZOLAM HYDROCHLORIDE 5 MG/ML
4 INJECTION INTRAMUSCULAR; INTRAVENOUS ONCE AS NEEDED
Status: CANCELLED | OUTPATIENT
Start: 2018-03-26 | End: 2018-03-26

## 2018-03-26 RX ORDER — SODIUM CHLORIDE, SODIUM LACTATE, POTASSIUM CHLORIDE, CALCIUM CHLORIDE 600; 310; 30; 20 MG/100ML; MG/100ML; MG/100ML; MG/100ML
INJECTION, SOLUTION INTRAVENOUS CONTINUOUS
Status: CANCELLED | OUTPATIENT
Start: 2018-03-26

## 2018-03-26 NOTE — DISCHARGE INSTRUCTIONS
PRE-ADMIT TESTING -  [phone]    [address]  [place] [facility]          Your surgery has been scheduled at [facility]. We are pleased to have the opportunity to serve you. For Further Information please call [phone].    On the day of surgery please report to the Information Desk on the 1st floor.    · CONTACT YOUR PHYSICIAN'S OFFICE THE DAY PRIOR TO YOUR SURGERY TO OBTAIN YOUR ARRIVAL TIME.     · The evening before surgery do not eat anything after 9 p.m. ( this includes hard candy, chewing gum and mints).  You may only have GATORADE, POWERADE AND WATER  from 9 p.m. until you leave your home.   DO NOT DRINK ANY LIQUIDS ON THE WAY TO THE HOSPITAL.      SPECIAL MEDICATION INSTRUCTIONS: TAKE medications checked off by the Anesthesiologist on your Medication List.    Angiogram Patients: Take medications as instructed by your physician, including aspirin.     Surgery Patients:    If you take ASPIRIN - Your PHYSICIAN/SURGEON will need to inform you IF/OR when you need to stop taking aspirin prior to your surgery.     Do Not take any medications containing IBUPROFEN.  Do Not Wear any make-up or dark nail polish   (especially eye make-up) to surgery. If you come to surgery with makeup on you will be required to remove the makeup or nail polish.    Do not shave your surgical area at least 5 days prior to your surgery. The surgical prep will be performed at the hospital according to Infection Control regulations.    Leave all valuables at home.   Do Not wear any jewelry or watches, including any metal in body piercings.  Contact Lens must be removed before surgery. Either do not wear the contact lens or bring a case and solution for storage.  Please bring a container for eyeglasses or dentures as required.  Bring any paperwork your physician has provided, such as consent forms,  history and physicals, doctor's orders, etc.   Bring comfortable clothes that are loose fitting to wear upon  discharge. Take into consideration the type of surgery being performed.  Maintain your diet as advised per your physician the day prior to surgery.      Adequate rest the night before surgery is advised.   Park in the Parking lot behind the hospital or in the [facility] across the street from the parking lot. Parking is complimentary.  If you will be discharged the same day as your procedure, please arrange for a responsible adult to drive you home or to accompany you if traveling by taxi.   YOU WILL NOT BE PERMITTED TO DRIVE OR TO LEAVE THE HOSPITAL ALONE AFTER SURGERY.   It is strongly recommended that you arrange for someone to remain with you for the first 24 hrs following your surgery.       Thank you for your cooperation.  The Staff of [facility].        Bathing Instructions                                                                 Please shower the evening before and morning of your procedure with    ANTIBACTERIAL SOAP. ( DIAL, etc )  Concentrate on the surgical area   for at least 3 minutes and rinse completely. Dry off as usual.   Do not use any deodorant, powder, body lotions, perfume, after shave or    cologne.

## 2018-03-26 NOTE — ANESTHESIA PREPROCEDURE EVALUATION
03/26/2018  Hayley Montana is a 48 y.o., female.    Anesthesia Evaluation    I have reviewed the Patient Summary Reports.    I have reviewed the Nursing Notes.   I have reviewed the Medications.     Review of Systems  Anesthesia Hx:  No problems with previous Anesthesia Denies Hx of Anesthetic complications  Denies Family Hx of Anesthesia complications.   Denies Personal Hx of Anesthesia complications.   Social:  Non-Smoker    Hematology/Oncology:  Hematology Normal   Oncology Normal     EENT/Dental:EENT/Dental Normal   Cardiovascular:  Cardiovascular Normal     Pulmonary:  Pulmonary Normal    Renal/:  Renal/ Normal     Hepatic/GI:  Hepatic/GI Normal    Musculoskeletal:  Musculoskeletal Normal    Neurological:  Neurology Normal    Endocrine:  Endocrine Normal    Dermatological:  Skin Normal    Psych:  Psychiatric Normal           Physical Exam  General:  Well nourished    Airway/Jaw/Neck:  Airway Findings: Mouth Opening: Normal Mallampati: II      Dental:  Dental Findings: In tact        Mental Status:  Mental Status Findings:  Cooperative, Alert and Oriented         Anesthesia Plan  Type of Anesthesia, risks & benefits discussed:  Anesthesia Type:  general  Patient's Preference:   Intra-op Monitoring Plan: standard ASA monitors  Intra-op Monitoring Plan Comments:   Post Op Pain Control Plan: multimodal analgesia and peripheral nerve block  Post Op Pain Control Plan Comments:   Induction:   IV  Beta Blocker:         Informed Consent: Patient understands risks and agrees with Anesthesia plan.  Questions answered. Anesthesia consent signed with patient.  ASA Score: 1     Day of Surgery Review of History & Physical:    H&P update referred to the surgeon.     Anesthesia Plan Notes: Peripheral nerve block for post op pain management discussed  Had sx tal this year, reinjured shoulder skiing.        Ready  For Surgery From Anesthesia Perspective.

## 2018-03-27 ENCOUNTER — HOSPITAL ENCOUNTER (OUTPATIENT)
Facility: OTHER | Age: 48
Discharge: HOME | End: 2018-03-27
Attending: ORTHOPAEDIC SURGERY | Admitting: ORTHOPAEDIC SURGERY
Payer: COMMERCIAL

## 2018-03-27 ENCOUNTER — ANESTHESIA (OUTPATIENT)
Dept: SURGERY | Facility: OTHER | Age: 48
End: 2018-03-27
Payer: COMMERCIAL

## 2018-03-27 VITALS
TEMPERATURE: 98 F | SYSTOLIC BLOOD PRESSURE: 138 MMHG | OXYGEN SATURATION: 96 % | WEIGHT: 215 LBS | DIASTOLIC BLOOD PRESSURE: 86 MMHG | BODY MASS INDEX: 32.58 KG/M2 | RESPIRATION RATE: 16 BRPM | HEART RATE: 82 BPM | HEIGHT: 68 IN

## 2018-03-27 DIAGNOSIS — M75.121 COMPLETE TEAR OF RIGHT ROTATOR CUFF: ICD-10-CM

## 2018-03-27 DIAGNOSIS — M75.121 COMPLETE ROTATOR CUFF TEAR OR RUPTURE OF RIGHT SHOULDER, NOT SPECIFIED AS TRAUMATIC: Primary | ICD-10-CM

## 2018-03-27 PROCEDURE — 25000003 PHARM REV CODE 250: Performed by: NURSE ANESTHETIST, CERTIFIED REGISTERED

## 2018-03-27 PROCEDURE — 63600175 PHARM REV CODE 636 W HCPCS: Performed by: NURSE ANESTHETIST, CERTIFIED REGISTERED

## 2018-03-27 PROCEDURE — 25000003 PHARM REV CODE 250: Performed by: ORTHOPAEDIC SURGERY

## 2018-03-27 PROCEDURE — 71000039 HC RECOVERY, EACH ADD'L HOUR: Performed by: ORTHOPAEDIC SURGERY

## 2018-03-27 PROCEDURE — 25000003 PHARM REV CODE 250: Performed by: ANESTHESIOLOGY

## 2018-03-27 PROCEDURE — 63600175 PHARM REV CODE 636 W HCPCS: Performed by: ANESTHESIOLOGY

## 2018-03-27 PROCEDURE — 27201423 OPTIME MED/SURG SUP & DEVICES STERILE SUPPLY: Performed by: ORTHOPAEDIC SURGERY

## 2018-03-27 PROCEDURE — 36000711: Performed by: ORTHOPAEDIC SURGERY

## 2018-03-27 PROCEDURE — 37000008 HC ANESTHESIA 1ST 15 MINUTES: Performed by: ORTHOPAEDIC SURGERY

## 2018-03-27 PROCEDURE — 37000009 HC ANESTHESIA EA ADD 15 MINS: Performed by: ORTHOPAEDIC SURGERY

## 2018-03-27 PROCEDURE — C1713 ANCHOR/SCREW BN/BN,TIS/BN: HCPCS | Performed by: ORTHOPAEDIC SURGERY

## 2018-03-27 PROCEDURE — 71000015 HC POSTOP RECOV 1ST HR: Performed by: ORTHOPAEDIC SURGERY

## 2018-03-27 PROCEDURE — 36000710: Performed by: ORTHOPAEDIC SURGERY

## 2018-03-27 PROCEDURE — 71000033 HC RECOVERY, INTIAL HOUR: Performed by: ORTHOPAEDIC SURGERY

## 2018-03-27 PROCEDURE — S0077 INJECTION, CLINDAMYCIN PHOSP: HCPCS | Performed by: ORTHOPAEDIC SURGERY

## 2018-03-27 PROCEDURE — 71000016 HC POSTOP RECOV ADDL HR: Performed by: ORTHOPAEDIC SURGERY

## 2018-03-27 PROCEDURE — 63600175 PHARM REV CODE 636 W HCPCS: Performed by: ORTHOPAEDIC SURGERY

## 2018-03-27 DEVICE — ANCHOR BIOCOMP W/3 SUTURES: Type: IMPLANTABLE DEVICE | Site: SHOULDER | Status: FUNCTIONAL

## 2018-03-27 DEVICE — ANCHOR SUT FT BIOCRKSCR 6.5MM: Type: IMPLANTABLE DEVICE | Site: SHOULDER | Status: FUNCTIONAL

## 2018-03-27 DEVICE — ANCHOR SUT PEEK 4.75X19.1MM: Type: IMPLANTABLE DEVICE | Site: SHOULDER | Status: FUNCTIONAL

## 2018-03-27 RX ORDER — OXYCODONE AND ACETAMINOPHEN 10; 325 MG/1; MG/1
1 TABLET ORAL
Qty: 50 TABLET | Refills: 0 | Status: SHIPPED | OUTPATIENT
Start: 2018-03-27 | End: 2018-11-07

## 2018-03-27 RX ORDER — ONDANSETRON 2 MG/ML
INJECTION INTRAMUSCULAR; INTRAVENOUS
Status: DISCONTINUED | OUTPATIENT
Start: 2018-03-27 | End: 2018-03-27

## 2018-03-27 RX ORDER — NEOSTIGMINE METHYLSULFATE 1 MG/ML
INJECTION, SOLUTION INTRAVENOUS
Status: DISCONTINUED | OUTPATIENT
Start: 2018-03-27 | End: 2018-03-27

## 2018-03-27 RX ORDER — ONDANSETRON 2 MG/ML
4 INJECTION INTRAMUSCULAR; INTRAVENOUS EVERY 12 HOURS PRN
Status: DISCONTINUED | OUTPATIENT
Start: 2018-03-27 | End: 2018-03-27 | Stop reason: HOSPADM

## 2018-03-27 RX ORDER — ACETAMINOPHEN 10 MG/ML
INJECTION, SOLUTION INTRAVENOUS
Status: DISCONTINUED | OUTPATIENT
Start: 2018-03-27 | End: 2018-03-27

## 2018-03-27 RX ORDER — OXYCODONE HYDROCHLORIDE 5 MG/1
10 TABLET ORAL EVERY 4 HOURS PRN
Status: DISCONTINUED | OUTPATIENT
Start: 2018-03-27 | End: 2018-03-27 | Stop reason: HOSPADM

## 2018-03-27 RX ORDER — DEXAMETHASONE SODIUM PHOSPHATE 4 MG/ML
INJECTION, SOLUTION INTRA-ARTICULAR; INTRALESIONAL; INTRAMUSCULAR; INTRAVENOUS; SOFT TISSUE
Status: DISCONTINUED | OUTPATIENT
Start: 2018-03-27 | End: 2018-03-27

## 2018-03-27 RX ORDER — FENTANYL CITRATE 50 UG/ML
INJECTION, SOLUTION INTRAMUSCULAR; INTRAVENOUS
Status: DISCONTINUED | OUTPATIENT
Start: 2018-03-27 | End: 2018-03-27

## 2018-03-27 RX ORDER — SODIUM CHLORIDE 0.9 % (FLUSH) 0.9 %
5 SYRINGE (ML) INJECTION
Status: DISCONTINUED | OUTPATIENT
Start: 2018-03-27 | End: 2018-03-27 | Stop reason: HOSPADM

## 2018-03-27 RX ORDER — LIDOCAINE HYDROCHLORIDE 10 MG/ML
0.5 INJECTION, SOLUTION EPIDURAL; INFILTRATION; INTRACAUDAL; PERINEURAL ONCE
Status: DISCONTINUED | OUTPATIENT
Start: 2018-03-27 | End: 2018-03-27 | Stop reason: HOSPADM

## 2018-03-27 RX ORDER — LIDOCAINE HCL/PF 100 MG/5ML
SYRINGE (ML) INTRAVENOUS
Status: DISCONTINUED | OUTPATIENT
Start: 2018-03-27 | End: 2018-03-27

## 2018-03-27 RX ORDER — CLINDAMYCIN PHOSPHATE 900 MG/50ML
900 INJECTION, SOLUTION INTRAVENOUS
Status: COMPLETED | OUTPATIENT
Start: 2018-03-27 | End: 2018-03-27

## 2018-03-27 RX ORDER — HYDROMORPHONE HYDROCHLORIDE 2 MG/ML
0.4 INJECTION, SOLUTION INTRAMUSCULAR; INTRAVENOUS; SUBCUTANEOUS EVERY 5 MIN PRN
Status: DISCONTINUED | OUTPATIENT
Start: 2018-03-27 | End: 2018-03-27 | Stop reason: HOSPADM

## 2018-03-27 RX ORDER — SODIUM CHLORIDE 0.9 % (FLUSH) 0.9 %
3 SYRINGE (ML) INJECTION
Status: DISCONTINUED | OUTPATIENT
Start: 2018-03-27 | End: 2018-03-27 | Stop reason: HOSPADM

## 2018-03-27 RX ORDER — EPINEPHRINE 1 MG/ML
INJECTION, SOLUTION INTRACARDIAC; INTRAMUSCULAR; INTRAVENOUS; SUBCUTANEOUS
Status: DISCONTINUED | OUTPATIENT
Start: 2018-03-27 | End: 2018-03-27 | Stop reason: HOSPADM

## 2018-03-27 RX ORDER — SODIUM CHLORIDE, SODIUM LACTATE, POTASSIUM CHLORIDE, CALCIUM CHLORIDE 600; 310; 30; 20 MG/100ML; MG/100ML; MG/100ML; MG/100ML
INJECTION, SOLUTION INTRAVENOUS CONTINUOUS
Status: DISCONTINUED | OUTPATIENT
Start: 2018-03-27 | End: 2018-03-27 | Stop reason: HOSPADM

## 2018-03-27 RX ORDER — MIDAZOLAM HYDROCHLORIDE 1 MG/ML
INJECTION INTRAMUSCULAR; INTRAVENOUS
Status: DISCONTINUED | OUTPATIENT
Start: 2018-03-27 | End: 2018-03-27

## 2018-03-27 RX ORDER — MIDAZOLAM HYDROCHLORIDE 1 MG/ML
2 INJECTION INTRAMUSCULAR; INTRAVENOUS ONCE
Status: DISCONTINUED | OUTPATIENT
Start: 2018-03-27 | End: 2018-03-27 | Stop reason: HOSPADM

## 2018-03-27 RX ORDER — FENTANYL CITRATE 50 UG/ML
25 INJECTION, SOLUTION INTRAMUSCULAR; INTRAVENOUS EVERY 5 MIN PRN
Status: DISCONTINUED | OUTPATIENT
Start: 2018-03-27 | End: 2018-03-27 | Stop reason: HOSPADM

## 2018-03-27 RX ORDER — MIDAZOLAM HYDROCHLORIDE 5 MG/ML
4 INJECTION INTRAMUSCULAR; INTRAVENOUS ONCE AS NEEDED
Status: DISCONTINUED | OUTPATIENT
Start: 2018-03-27 | End: 2018-03-27 | Stop reason: HOSPADM

## 2018-03-27 RX ORDER — DIPHENHYDRAMINE HCL 25 MG
25 CAPSULE ORAL ONCE
Status: COMPLETED | OUTPATIENT
Start: 2018-03-27 | End: 2018-03-27

## 2018-03-27 RX ORDER — FAMOTIDINE 20 MG/1
20 TABLET, FILM COATED ORAL
Status: COMPLETED | OUTPATIENT
Start: 2018-03-27 | End: 2018-03-27

## 2018-03-27 RX ORDER — PROPOFOL 10 MG/ML
VIAL (ML) INTRAVENOUS
Status: DISCONTINUED | OUTPATIENT
Start: 2018-03-27 | End: 2018-03-27

## 2018-03-27 RX ORDER — ONDANSETRON 2 MG/ML
4 INJECTION INTRAMUSCULAR; INTRAVENOUS DAILY PRN
Status: DISCONTINUED | OUTPATIENT
Start: 2018-03-27 | End: 2018-03-27 | Stop reason: HOSPADM

## 2018-03-27 RX ORDER — PROPOFOL 10 MG/ML
VIAL (ML) INTRAVENOUS CONTINUOUS PRN
Status: DISCONTINUED | OUTPATIENT
Start: 2018-03-27 | End: 2018-03-27

## 2018-03-27 RX ORDER — ROCURONIUM BROMIDE 10 MG/ML
INJECTION, SOLUTION INTRAVENOUS
Status: DISCONTINUED | OUTPATIENT
Start: 2018-03-27 | End: 2018-03-27

## 2018-03-27 RX ORDER — GLYCOPYRROLATE 0.2 MG/ML
INJECTION INTRAMUSCULAR; INTRAVENOUS
Status: DISCONTINUED | OUTPATIENT
Start: 2018-03-27 | End: 2018-03-27

## 2018-03-27 RX ORDER — ONDANSETRON 4 MG/1
8 TABLET, ORALLY DISINTEGRATING ORAL EVERY 8 HOURS PRN
Qty: 10 TABLET | Refills: 1 | Status: SHIPPED | OUTPATIENT
Start: 2018-03-27 | End: 2018-11-07

## 2018-03-27 RX ORDER — ROPIVACAINE HYDROCHLORIDE 5 MG/ML
INJECTION, SOLUTION EPIDURAL; INFILTRATION; PERINEURAL
Status: DISCONTINUED | OUTPATIENT
Start: 2018-03-27 | End: 2018-03-27

## 2018-03-27 RX ORDER — FENTANYL CITRATE 50 UG/ML
100 INJECTION, SOLUTION INTRAMUSCULAR; INTRAVENOUS EVERY 5 MIN PRN
Status: DISCONTINUED | OUTPATIENT
Start: 2018-03-27 | End: 2018-03-27 | Stop reason: HOSPADM

## 2018-03-27 RX ORDER — MEPERIDINE HYDROCHLORIDE 50 MG/ML
12.5 INJECTION INTRAMUSCULAR; INTRAVENOUS; SUBCUTANEOUS ONCE AS NEEDED
Status: DISCONTINUED | OUTPATIENT
Start: 2018-03-27 | End: 2018-03-27 | Stop reason: HOSPADM

## 2018-03-27 RX ORDER — HYDROCODONE BITARTRATE AND ACETAMINOPHEN 5; 325 MG/1; MG/1
1 TABLET ORAL EVERY 4 HOURS PRN
Status: DISCONTINUED | OUTPATIENT
Start: 2018-03-27 | End: 2018-03-27 | Stop reason: HOSPADM

## 2018-03-27 RX ORDER — OXYCODONE HYDROCHLORIDE 5 MG/1
5 TABLET ORAL
Status: DISCONTINUED | OUTPATIENT
Start: 2018-03-27 | End: 2018-03-27 | Stop reason: HOSPADM

## 2018-03-27 RX ADMIN — DIPHENHYDRAMINE HYDROCHLORIDE 25 MG: 25 CAPSULE ORAL at 06:03

## 2018-03-27 RX ADMIN — CLINDAMYCIN PHOSPHATE 900 MG: 18 INJECTION, SOLUTION INTRAVENOUS at 12:03

## 2018-03-27 RX ADMIN — PROPOFOL 120 MCG/KG/MIN: 10 INJECTION, EMULSION INTRAVENOUS at 01:03

## 2018-03-27 RX ADMIN — FENTANYL CITRATE 100 MCG: 50 INJECTION, SOLUTION INTRAMUSCULAR; INTRAVENOUS at 11:03

## 2018-03-27 RX ADMIN — ROPIVACAINE HYDROCHLORIDE 30 ML: 5 INJECTION, SOLUTION EPIDURAL; INFILTRATION; PERINEURAL at 11:03

## 2018-03-27 RX ADMIN — PROPOFOL 200 MG: 10 INJECTION, EMULSION INTRAVENOUS at 12:03

## 2018-03-27 RX ADMIN — HYDROMORPHONE HYDROCHLORIDE 0.4 MG: 2 INJECTION INTRAMUSCULAR; INTRAVENOUS; SUBCUTANEOUS at 04:03

## 2018-03-27 RX ADMIN — FAMOTIDINE 20 MG: 20 TABLET, FILM COATED ORAL at 11:03

## 2018-03-27 RX ADMIN — SODIUM CHLORIDE, SODIUM LACTATE, POTASSIUM CHLORIDE, AND CALCIUM CHLORIDE: 600; 310; 30; 20 INJECTION, SOLUTION INTRAVENOUS at 11:03

## 2018-03-27 RX ADMIN — FENTANYL CITRATE 50 MCG: 50 INJECTION, SOLUTION INTRAMUSCULAR; INTRAVENOUS at 02:03

## 2018-03-27 RX ADMIN — DEXAMETHASONE SODIUM PHOSPHATE 8 MG: 4 INJECTION, SOLUTION INTRAMUSCULAR; INTRAVENOUS at 12:03

## 2018-03-27 RX ADMIN — HYDROMORPHONE HYDROCHLORIDE 0.4 MG: 2 INJECTION INTRAMUSCULAR; INTRAVENOUS; SUBCUTANEOUS at 03:03

## 2018-03-27 RX ADMIN — ACETAMINOPHEN 1000 MG: 10 INJECTION, SOLUTION INTRAVENOUS at 01:03

## 2018-03-27 RX ADMIN — FENTANYL CITRATE 50 MCG: 50 INJECTION, SOLUTION INTRAMUSCULAR; INTRAVENOUS at 12:03

## 2018-03-27 RX ADMIN — ONDANSETRON 4 MG: 2 INJECTION INTRAMUSCULAR; INTRAVENOUS at 02:03

## 2018-03-27 RX ADMIN — MIDAZOLAM HYDROCHLORIDE 2 MG: 1 INJECTION, SOLUTION INTRAMUSCULAR; INTRAVENOUS at 11:03

## 2018-03-27 RX ADMIN — LIDOCAINE HYDROCHLORIDE 100 MG: 20 INJECTION, SOLUTION INTRAVENOUS at 12:03

## 2018-03-27 RX ADMIN — SODIUM CHLORIDE, SODIUM GLUCONATE, SODIUM ACETATE, POTASSIUM CHLORIDE, MAGNESIUM CHLORIDE, SODIUM PHOSPHATE, DIBASIC, AND POTASSIUM PHOSPHATE: .53; .5; .37; .037; .03; .012; .00082 INJECTION, SOLUTION INTRAVENOUS at 02:03

## 2018-03-27 RX ADMIN — NEOSTIGMINE METHYLSULFATE 5 MG: 1 INJECTION INTRAVENOUS at 03:03

## 2018-03-27 RX ADMIN — ROCURONIUM BROMIDE 45 MG: 10 INJECTION, SOLUTION INTRAVENOUS at 12:03

## 2018-03-27 RX ADMIN — GLYCOPYRROLATE 0.6 MG: 0.2 INJECTION, SOLUTION INTRAMUSCULAR; INTRAVENOUS at 03:03

## 2018-03-27 RX ADMIN — FENTANYL CITRATE 50 MCG: 50 INJECTION, SOLUTION INTRAMUSCULAR; INTRAVENOUS at 01:03

## 2018-03-27 RX ADMIN — OXYCODONE HYDROCHLORIDE 5 MG: 5 TABLET ORAL at 03:03

## 2018-03-27 NOTE — DISCHARGE INSTRUCTIONS
Shoulder Arthroscopy Post-Op Instructions                                       Dr. Nathanael Elena    1. Sling/Brace- You should wear the brace until the first post-op visit. You can take the sling off to shower but keep your arm at your side.  Do not lift your arm away from your body unless told otherwise.  I will give you/your family specific instructions about the length of brace wear.    2. Bandage- If you have physical therapy set up they will remove the bandage. Otherwise you can remove it in 3 days. Keep the incisions clean, dry and covered. Do not get it wet until you see me.     3. Ice- Use the polar care as much as you want. Make sure there are clothes or a towel between the cooling unit and your skin.     4. Exercise- If you have PT set up, they will instruct you on exercises to do. If you do not, it is OK to lean your arm over and make circles with your hand pointing to the floor (called Pendulum exercises). Do not lift or grasp anything away from the body until you see me. It is OK to take your arm out of the sling and extend your elbow as long as your elbow is at your side.     5. Medications- You will be given pain and nausea prescriptions to go home. Take the medications as written.  Call the office if you are running low with at least 2-3 days notice to give us time to refill it.  We also recommend taking a baby aspirin for 2 weeks after surgery to decrease the (very,very low) risk of blood clot formation.    6. Bathing- Do not get your shoulder wet until you see me in clinic.    7. Appointment- You should already have your post-op appointment scheduled. If you do not or you can't remember, call the office for more information.       Anesthesia: After Your Surgery  Youve just had surgery. During surgery, you received medication called anesthesia to keep you comfortable and pain-free. After surgery, you may experience some pain or nausea. This is common. Here are some tips for feeling better and  recovering after surgery.    Going home  Your doctor or nurse will show you how to take care of yourself when you go home. He or she will also answer your questions. Have an adult family member or friend drive you home. For the first 24 hours after your surgery:  · Do not drive or use heavy equipment.  · Do not make important decisions or sign legal documents.  · Avoid alcohol.  · Have someone stay with you, if needed. He or she can watch for problems and help keep you safe.  Be sure to keep all follow-up appointments with your doctor. And rest after your procedure for as long as your doctor tells you to.    Coping with pain  If you have pain after surgery, pain medication will help you feel better. Take it as directed, before pain becomes severe. Also, ask your doctor or pharmacist about other ways to control pain, such as with heat, ice, and relaxation. And follow any other instructions your surgeon or nurse gives you.    Tips for taking pain medication  To get the best relief possible, remember these points:  · Pain medications can upset your stomach. Taking them with a little food may help.  · Most pain relievers taken by mouth need at least 20 to 30 minutes to take effect.  · Taking medication on a schedule can help you remember to take it. Try to time your medication so that you can take it before beginning an activity, such as dressing, walking, or sitting down for dinner.  · Constipation is a common side effect of pain medications. Contact your doctor before taking any medications like laxatives or stool softeners to help relieve constipation. Also ask about any dietary restrictions, because drinking lots of fluids and eating foods like fruits and vegetables that are high in fiber can also help. Remember, dont take laxatives unless your surgeon has prescribed them.  · Mixing alcohol and pain medication can cause dizziness and slow your breathing. It can even be fatal. Dont drink alcohol while taking pain  medication.  · Pain medication can slow your reflexes. Dont drive or operate machinery while taking pain medication.  If your health care provider tells you to take acetaminophen to help relieve your pain, ask him or her how much you are supposed to take each day. (Acetaminophen is the generic name for Tylenol and other brand-name pain relievers.) Acetaminophen or other pain relievers may interact with your prescription medicines or other over-the-counter (OTC) drugs. Some prescription medications contain acetaminophen along with other active ingredients. Using both prescription and OTC acetaminophen for pain can cause you to overdose. The FDA recommends that you read the labels on your OTC medications carefully. This will help you to clearly understand the list of active ingredients, dosing instructions, and any warnings. It may also help you avoid taking too much acetaminophen. If you have questions or don't understand the information, ask your pharmacist or health care provider to explain it to you before you take the OTC medication.    Managing nausea  Some people have an upset stomach after surgery. This is often due to anesthesia, pain, pain medications, or the stress of surgery. The following tips will help you manage nausea and get good nutrition as you recover. If you were on a special diet before surgery, ask your doctor if you should follow it during recovery. These tips may help:  · Dont push yourself to eat. Your body will tell you when to eat and how much.  · Start off with clear liquids and soup. They are easier to digest.  · Progress to semi-solid foods (mashed potatoes, applesauce, and gelatin) as you feel ready.  · Slowly move to solid foods. Dont eat fatty, rich, or spicy foods at first.  · Dont force yourself to have three large meals a day. Instead, eat smaller amounts more often.  · Take pain medications with a small amount of solid food, such as crackers or toast to avoid nausea.      Call  your surgeon if  · You still have pain an hour after taking medication (it may not be strong enough).  · You feel too sleepy, dizzy, or groggy (medication may be too strong).  · You have side effects like nausea, vomiting, or skin changes (rash, itching, or hives).   © 3500-9950 [employer]. [address] [place] [address]. All rights reserved. This information is not intended as a substitute for professional medical care. Always follow your healthcare professional's instructions.

## 2018-03-27 NOTE — OR NURSING
Voided incontinently upon arrival to pacu.  Linens changed.  C/o pain to rt shoulder and rates pain an 8

## 2018-03-27 NOTE — OP NOTE
[facility]  Brief Operative Note     SUMMARY     Surgery Date: 3/27/2018     Surgeon(s) and Role:     * Nathanael Elena MD - Primary    Assisting Surgeon: None    Pre-op Diagnosis:  Complete rotator cuff tear or rupture of right shoulder, not specified as traumatic [M75.121]    Post-op Diagnosis:  Post-Op Diagnosis Codes:     * Complete rotator cuff tear or rupture of right shoulder, not specified as traumatic [M75.121]    Procedure(s) (LRB):  ARTHROSCOPY-SHOULDER (Right)  REPAIR-ROTATOR CUFF (Right) revision, FBR    Anesthesia: General + Regional    Description of the findings of the procedure: complete retear    Findings/Key Components: complete retear    Estimated Blood Loss: 5cc         Specimens:   Specimen (12h ago through future)    None          Discharge Note    SUMMARY     Admit Date: 3/27/2018    Discharge Date and Time:  03/27/2018 3:21 PM    Hospital Course (synopsis of major diagnoses, care, treatment, and services provided during the course of the hospital stay): outpt     Final Diagnosis: Post-Op Diagnosis Codes:     * Complete rotator cuff tear or rupture of right shoulder, not specified as traumatic [M75.121]    Disposition: Home or Self Care    Follow Up/Patient Instructions:     Medications:  Reconciled Home Medications:      Medication List      START taking these medications    ondansetron 4 MG Tbdl  Commonly known as:  ZOFRAN-ODT  Take 2 tablets (8 mg total) by mouth every 8 (eight) hours as needed.        CHANGE how you take these medications    * oxyCODONE-acetaminophen  mg per tablet  Commonly known as:  PERCOCET  Take 1 tablet by mouth every 4 to 6 hours as needed.  What changed:  Another medication with the same name was added. Make sure you understand how and when to take each.     * oxyCODONE-acetaminophen  mg per tablet  Commonly known as:  PERCOCET  Take 1 tablet by mouth every 4 to 6 hours as needed.  What changed:  You were already taking a  medication with the same name, and this prescription was added. Make sure you understand how and when to take each.        * This list has 2 medication(s) that are the same as other medications prescribed for you. Read the directions carefully, and ask your doctor or other care provider to review them with you.            CONTINUE taking these medications    escitalopram oxalate 20 MG tablet  Commonly known as:  LEXAPRO     ibuprofen 200 MG tablet  Commonly known as:  ADVIL,MOTRIN     lisdexamfetamine 30 MG capsule  Commonly known as:  VYVANSE           Where to Get Your Medications      These medications were sent to [facility] - [place], [place] - [address]  [address], [facility] [address]    Phone:  [phone]   · ondansetron 4 MG Tbdl     You can get these medications from any pharmacy    Bring a paper prescription for each of these medications  · oxyCODONE-acetaminophen  mg per tablet         Discharge Procedure Orders  Diet general     Call MD for:  temperature >100.4     Call MD for:  persistent nausea and vomiting     Call MD for:  severe uncontrolled pain     Call MD for:  difficulty breathing, headache or visual disturbances     Call MD for:  redness, tenderness, or signs of infection (pain, swelling, redness, odor or green/yellow discharge around incision site)     Call MD for:  hives     Call MD for:  persistent dizziness or light-headedness     Call MD for:  extreme fatigue     Ice to affected area     Lifting restrictions     Remove dressing in 72 hours   Order Comments: Then change daily. Keep clean, dry and covered       Follow-up Information     Nathanael Freeman MD. Schedule an appointment as soon as possible for a visit in 2 weeks.    Specialty:  Orthopedic Surgery  Why:  For suture removal, For wound re-check  Contact information:  [address] [place] [address] [phone]

## 2018-03-27 NOTE — TRANSFER OF CARE
"Anesthesia Transfer of Care Note    Patient: Hayley Montana    Procedure(s) Performed: Procedure(s) (LRB):  ARTHROSCOPY-SHOULDER (Right)  REPAIR-ROTATOR CUFF (Right)    Patient location: PACU    Anesthesia Type: general    Transport from OR: Transported from OR on room air with adequate spontaneous ventilation    Post pain: adequate analgesia    Post assessment: no apparent anesthetic complications    Post vital signs: stable    Level of consciousness: awake and alert    Nausea/Vomiting: no nausea/vomiting    Complications: none    Transfer of care protocol was followed      Last vitals:   Visit Vitals  BP (!) 147/88   Pulse (!) 59   Temp 36.8 °C (98.2 °F)   Resp 18   Ht 5' 8" (1.727 m)   Wt 97.5 kg (215 lb)   SpO2 95%   BMI 32.69 kg/m²     "

## 2018-03-27 NOTE — OR NURSING
Beginning to c/0 pain to rt posterior axilla area of shoulder and raes pain a 7. Unable to move fingers and denies c/o sensation to fingers and arm.

## 2018-03-27 NOTE — OR NURSING
Attempted to call sister ,  Did not answer phone.  Notified sarah Gill in ACU and asked her to notify her sister of status.

## 2018-03-27 NOTE — OR NURSING
States pain is now a 2.  Feels much better.  C/o sticking sensation under rt axilla.  Loosened the tape under her arm slightly and states it feels much better.

## 2018-03-27 NOTE — ANESTHESIA PROCEDURE NOTES
Peripheral    Patient location during procedure: pre-op   Block not for primary anesthetic.  Reason for block: at surgeon's request and post-op pain management   Post-op Pain Location: right shoulder paIN   Staffing  Anesthesiologist: MASOOD ANGELO  Performed: anesthesiologist   Preanesthetic Checklist  Completed: patient identified, site marked, surgical consent, pre-op evaluation, timeout performed, IV checked, risks and benefits discussed and monitors and equipment checked  Peripheral Block  Patient position: sitting  Prep: ChloraPrep  Patient monitoring: heart rate, cardiac monitor, continuous pulse ox, continuous capnometry and frequent blood pressure checks  Block type: interscalene  Laterality: right  Injection technique: single shot  Needle  Needle type: Stimuplex   Needle gauge: 22 G  Needle length: 2 in  Needle localization: anatomical landmarks and ultrasound guidance   -ultrasound image captured on disc.  Assessment  Injection assessment: negative aspiration, negative parasthesia and local visualized surrounding nerve  Paresthesia pain: none  Heart rate change: no  Slow fractionated injection: yes  Medications:  Bolus administered: 30 mL of 0.5 ropivacaine  Additional Notes  VSS.  [facility] RN monitoring vitals throughout procedure.  Patient tolerated procedure well.

## 2018-03-27 NOTE — ANESTHESIA POSTPROCEDURE EVALUATION
"Anesthesia Post Evaluation    Patient: Hayley Montana    Procedure(s) Performed: Procedure(s) (LRB):  ARTHROSCOPY-SHOULDER (Right)  REPAIR-ROTATOR CUFF (Right)    Final Anesthesia Type: general  Patient location during evaluation: PACU  Patient participation: Yes- Able to Participate  Level of consciousness: awake and alert  Post-procedure vital signs: reviewed and stable  Pain management: adequate  Airway patency: patent  PONV status at discharge: No PONV  Anesthetic complications: no      Cardiovascular status: blood pressure returned to baseline  Respiratory status: unassisted and spontaneous ventilation  Hydration status: euvolemic  Follow-up not needed.        Visit Vitals  /83 (BP Location: Left arm, Patient Position: Lying)   Pulse 80   Temp 36.4 °C (97.5 °F) (Oral)   Resp 20   Ht 5' 8" (1.727 m)   Wt 97.5 kg (215 lb)   SpO2 100%   BMI 32.69 kg/m²       Pain/Doug Score: Pain Assessment Performed: Yes (3/27/2018  3:28 PM)  Presence of Pain: complains of pain/discomfort (3/27/2018  4:00 PM)  Pain Rating Prior to Med Admin: 8 (3/27/2018  4:08 PM)  Doug Score: 9 (3/27/2018  4:00 PM)      "

## 2018-03-28 NOTE — OP NOTE
DATE OF PROCEDURE:  03/27/2018    PREOPERATIVE DIAGNOSES:  1.  Right shoulder full-thickness rotator cuff rupture/retear.  2.  Foreign body, right shoulder.    POSTOPERATIVE DIAGNOSES:  1.  Right shoulder full-thickness rotator cuff rupture/retear.  2.  Foreign body, right shoulder.    PROCEDURES PERFORMED:  1.  Right shoulder arthroscopic revision rotator cuff repair.  2.  Right shoulder arthroscopic foreign body removal (loose SwiveLock anchor with        multiple free sutures).    SURGEON:  Nathanael Elena M.D.    ASSISTANT:  Haleigh Durbin CST.    COMPLICATIONS:  None.    SPECIMENS:  None.    ESTIMATED BLOOD LOSS:  5 mL.    TOURNIQUET TIME:  None.    IMPLANTS USED:  Arthrex 5.5 mm triple-loaded corkscrew anchor x1 and 6.5 mm   double-loaded corkscrew anchor x1 for medial row repair and two 4.75 mm   SwiveLock anchors for lateral row repair for a double row/transosseous   equivalent repair.    POSTOPERATIVE PLAN:  The patient will follow a type three/massive rotator cuff   repair protocol.    HISTORY:  Hayley Montana is a 48-year-old female who had right shoulder   full-thickness rotator cuff tear while skiing in January 2018.  During the   rehabilitation process, after rotator cuff repair, she subsequently had a retear   confirmed by MRI.  All risks and benefits were discussed at length and informed   consent was obtained for the above procedure.    PROCEDURE IN DETAIL:  Hayley Montana was taken to the Operating Room on 3/27/2018   for planned right shoulder arthroscopy.  She was given 900 mg of clindamycin as   well as a regional nerve block by the Anesthesia Service within 1 hour of the   incision.  She was taken to the Operating Room and placed in a supine position.    General endotracheal anesthesia was administered.  Examination under anesthesia   revealed full passive motion with no pathologic laxity.  She was then placed in   the lateral decubitus position with all bony prominences padded appropriately   and  an axillary roll.  Approximately 10 pounds of suspension were used with a   shoulder suspension device.  Her right shoulder was then prepped and draped in   the usual sterile fashion.  A time-out procedure was performed identifying the   right shoulder as the surgical site.  The previous posterior lateral and   anterior portals were all marked out as well as all the bony prominences and   approximately 30 mL of normal saline were then injected into the glenohumeral   joint to aid in capsular distention.  The standard posterior portal was then   established, which was then followed by an anterior portal established under   direct visualization with spinal needle localization.  Immediately upon entering   the shoulder joint, there was noted to be extensive synovitis throughout the   rotator interval and the superior labrum and the full-thickness supraspinatus   and infraspinatus tears were confirmed.  The shaver was brought in and a partial   synovectomy was performed at the rotator interval.  The scope was then placed   into the subacromial space.  There, there was marked fibrotic tissue, which had   caused the adhesions between the supraspinatus and infraspinatus as well as   along the undersurface of the acromion.  Approximately 45 minutes were spent   trying to define the tissue planes as the patient had in addition to the rotator   cuff repair one of the rotation medical bovine allograft patches placed.  This   patch had not incorporated and was removed.  Some cuff tissue that was left on   the footprint, but unlike the MRI, which suggested a 2 or more cm of tissue on   the footprint.  It seemed like it was a retear through the sutures with minimal   soft tissue left on the tuberosity.  In addition, a lateral SwiveLock anchor had   pulled out partially and then this was also removed.  Next, with the   combination of ring curette and an oscillating shaver, the bone along the medial   footprint was lightly  decorticated to create a bleeding bony bed.  In addition,   a ring curette was used to remove some of the articular cartilage in order to   hopefully medialize the cuff and take that tension off the repair.  After all   fibrous tissue was removed from the planned repair site, an accessory lateral   portal was established and a 5.5-mm triple loaded corkscrew anchor was placed.    The sutures were then all passed with the Scorpion suture passing device in a   horizontal mattress fashion.  Next, a more posterior 5.5 mm triple loaded anchor   was placed and again, the sutures were all passed in a horizontal mattress   fashion.  We then proceeded with knot tying.  We started with the posterior   sutures, sliding knots were tied for the first two sets of sutures, but upon   tightening the second set of sutures posteriorly the anchor pulled out.  The   anchor was then removed along with the corresponding sutures and a 6.5 mm double   loaded Rescue anchor was then placed and I was able to get a nice squeak of   interference fit.  The sutures were again passed in the horizontal mattress   fashion and sliding knots were tied with all four sutures of the posterior   anchor and anterior medial row anchor.  A nice reapproximation of the cuff   tissue was achieved without undue tension.  Overall, tendon quality was   mediocre.  It was not the friable, wet Kleenex type appearance.  There was also   not the glistening white well vascularized normal rotator cuff tissue.  In order   to get some of the lateral edges of the cuff to lay down, I then ran the   remaining sutures split up into two grooves and two lateral row anchors were   placed to aid in the strength of the repair of construct.  The shoulder was then   taken out of suspension, internally and externally rotated and the rotator cuff   moved in continuity and did not appear to be under undue tension.  The shaver   was then brought back in and any remaining debris of anchor  material, suture   material or the purple PLLA staples for the rotation medical patch were all   removed.  No drain was placed.  The portals were then closed with 3-0 Prolene   suture followed by a PolarCare unit and an abduction brace.  The patient was   then extubated in the Operating Room and taken to the PACU without complication.            /sergey [id number] navjot(s)        TIANA  dd: 03/27/2018 15:58:01 (CDT)  td: 03/28/2018 00:00:20 (CDT)  Doc ID   #[id number]  Job ID #[id number]    CC:

## 2018-11-07 ENCOUNTER — TELEPHONE (OUTPATIENT)
Dept: FAMILY MEDICINE | Facility: CLINIC | Age: 48
End: 2018-11-07

## 2018-11-07 ENCOUNTER — OFFICE VISIT (OUTPATIENT)
Dept: FAMILY MEDICINE | Facility: CLINIC | Age: 48
End: 2018-11-07
Payer: COMMERCIAL

## 2018-11-07 ENCOUNTER — TELEPHONE (OUTPATIENT)
Dept: ADMINISTRATIVE | Facility: HOSPITAL | Age: 48
End: 2018-11-07

## 2018-11-07 VITALS
DIASTOLIC BLOOD PRESSURE: 82 MMHG | HEIGHT: 68 IN | RESPIRATION RATE: 18 BRPM | WEIGHT: 206 LBS | HEART RATE: 86 BPM | BODY MASS INDEX: 31.22 KG/M2 | OXYGEN SATURATION: 98 % | TEMPERATURE: 99 F | SYSTOLIC BLOOD PRESSURE: 122 MMHG

## 2018-11-07 DIAGNOSIS — R10.9 ABDOMINAL SPASMS: Primary | ICD-10-CM

## 2018-11-07 PROCEDURE — 99999 PR PBB SHADOW E&M-EST. PATIENT-LVL IV: CPT | Mod: PBBFAC,,, | Performed by: FAMILY MEDICINE

## 2018-11-07 PROCEDURE — 3008F BODY MASS INDEX DOCD: CPT | Mod: CPTII,S$GLB,, | Performed by: FAMILY MEDICINE

## 2018-11-07 PROCEDURE — 99214 OFFICE O/P EST MOD 30 MIN: CPT | Mod: S$GLB,,, | Performed by: FAMILY MEDICINE

## 2018-11-07 RX ORDER — TOBRAMYCIN AND DEXAMETHASONE 3; 1 MG/ML; MG/ML
SUSPENSION/ DROPS OPHTHALMIC
COMMUNITY
Start: 2018-08-06 | End: 2018-11-07

## 2018-11-07 RX ORDER — OMEPRAZOLE 20 MG/1
20 CAPSULE, DELAYED RELEASE ORAL EVERY MORNING
Refills: 3 | COMMUNITY
Start: 2018-08-06 | End: 2019-04-26

## 2018-11-07 NOTE — TELEPHONE ENCOUNTER
----- Message from Jg Quintana sent at 11/7/2018  3:27 PM CST -----  Contact: self [phone]  Patient would like to have her prescriptions called in. Please call and advise.

## 2018-11-07 NOTE — PROGRESS NOTES
The the HPI:  Hayley Montana is a 48 y.o. year old female that  presents to establish care. She has been having generalized abdominal spasms  That have never been diagnosed.She has had a hysterectomy and single oopherectomy which lessened the intensity and frequency.She has had a EGD and colonoscopy that was negative.   Chief Complaint   Patient presents with    [facility]    Abdominal Pain     pt describes the pain as spasms--for years   .     HPI      Past Medical History:   Diagnosis Date    General anesthetics causing adverse effect in therapeutic use      Social History     Socioeconomic History    Marital status:      Spouse name: Not on file    Number of children: Not on file    Years of education: Not on file    Highest education level: Not on file   Social Needs    Financial resource strain: Not on file    Food insecurity - worry: Not on file    Food insecurity - inability: Not on file    Transportation needs - medical: Not on file    Transportation needs - non-medical: Not on file   Occupational History    Not on file   Tobacco Use    Smoking status: Never Smoker    Smokeless tobacco: Never Used   Substance and Sexual Activity    Alcohol use: Yes     Comment: social    Drug use: No    Sexual activity: Not on file   Other Topics Concern    Not on file   Social History Narrative    Not on file     Past Surgical History:   Procedure Laterality Date    ARTHROSCOPY-SHOULDER Right 3/27/2018    [facility]    ARTHROSCOPY-SHOULDER Right 2018    [facility]    ARTHROSCOPY-SHOULDER WITH SUBACROMIAL DECOMPRESSION W/ ALLOGRAFT AUGMENTATION Right 2018    [facility]     SECTION      x2    COSMETIC SURGERY      breast lift tummy tuck    HYSTERECTOMY      REPAIR-ROTATOR CUFF Right 3/27/2018    [facility]    REPAIR-ROTATOR CUFF Right  "1/12/2018    [facility] [place] [place]    REPAIR-TENDON-BICEP BICEPS TENOTOMY ARTHROSCOPY Right 1/12/2018    [facility] [place] [place]    SHOULDER ARTHROSCOPY      TONSILLECTOMY       Family History   Problem Relation Age of Onset    Neuropathy Mother     Hypertension Mother     Pancreatic cancer Father     Diabetes Father     Thyroid disease Sister     Hernia Sister     Scleroderma Sister     Diabetes Brother     No Known Problems Daughter     Asthma Son     No Known Problems Sister            Review of Systems  General ROS: negative for chills, fever or weight loss  ENT ROS: negative for epistaxis, sore throat or rhinorrhea  Respiratory ROS: no cough, shortness of breath, or wheezing  Cardiovascular ROS: no chest pain or dyspnea on exertion  Gastrointestinal ROS:  Positive abdominal spasm, change in bowel habits, or black/ bloody stools    Physical Exam:  /82   Pulse 86   Temp 98.5 °F (36.9 °C) (Oral)   Resp 18   Ht 5' 8" (1.727 m)   Wt 93.4 kg (206 lb)   LMP 11/07/2016 (Approximate)   SpO2 98%   BMI 31.32 kg/m²   General appearance: alert, cooperative, no distress  Constitutional:Oriented to person, place, and time.appears well-developed and well-nourished.  HEENT: Normocephalic, atraumatic, neck symmetrical, no nasal discharge, TM- clear bilaterally  Lungs: clear to auscultation bilaterally, no dullness to percussion bilaterally  Heart: regular rate and rhythm without rub; no displacement of the PMI , S1&S2 present  Abdomen: soft, non-tender; bowel sounds normoactive; no organomegaly  Physical Exam    LABS:    Complete Blood Count  No results found for: RBC, HGB, HCT, MCV, MCH, MCHC, RDW, PLT, MPV, GRAN, LYMPH, MONO, EOS, BASO, GRAN, LYMPH, MONO, EOSINOPHIL, BASOPHIL, DIFFMETHOD    Comprehensive Metabolic Panel  No results found for: GLU, BUN, CREATININE, NA, K, CL, PROT, ALBUMIN, BILITOT, AST, ALKPHOS, CO2, ALT, ANIONGAP, EGFRNONAA, " ESTGFRAFRICA    LIPID  No results found for: CHOL, HDL      TSH  No results found for: TSH    Current Outpatient Medications   Medication Sig Dispense Refill    escitalopram oxalate (LEXAPRO) 20 MG tablet Take 20 mg by mouth once daily.      lisdexamfetamine (VYVANSE) 30 MG capsule Take 30 mg by mouth every morning.      omeprazole (PRILOSEC) 20 MG capsule Take 20 mg by mouth every morning.  3    dicyclomine (BENTYL) 10 MG capsule Take 1 capsule (10 mg total) by mouth 4 (four) times daily before meals and nightly. 120 capsule 0     No current facility-administered medications for this visit.        Assessment:    ICD-10-CM ICD-9-CM    1. Abdominal spasms R10.9 789.00 Magnesium      dicyclomine (BENTYL) 10 MG capsule         Plan:    Follow-up in 2 weeks (on 11/21/2018).          Leann Celeste MD

## 2018-11-08 RX ORDER — DICYCLOMINE HYDROCHLORIDE 10 MG/1
10 CAPSULE ORAL
Qty: 120 CAPSULE | Refills: 0 | Status: SHIPPED | OUTPATIENT
Start: 2018-11-08 | End: 2018-12-08

## 2018-11-13 NOTE — TELEPHONE ENCOUNTER
----- Message from Paige Nava sent at 11/13/2018  1:47 PM CST -----  Contact: self / [phone]  Patient is requesting a refill on the below. Please advise        lisdexamfetamine (VYVANSE) 30 MG capsule    escitalopram oxalate (LEXAPRO) 20 MG tablet  Pharmacy     [facility]- [facility] - [place], [place] - [address]

## 2018-11-14 RX ORDER — ESCITALOPRAM OXALATE 20 MG/1
20 TABLET ORAL DAILY
Qty: 30 TABLET | Refills: 2 | Status: SHIPPED | OUTPATIENT
Start: 2018-11-14 | End: 2018-12-11 | Stop reason: SDUPTHER

## 2018-11-15 ENCOUNTER — TELEPHONE (OUTPATIENT)
Dept: ADMINISTRATIVE | Facility: HOSPITAL | Age: 48
End: 2018-11-15

## 2018-11-29 ENCOUNTER — TELEPHONE (OUTPATIENT)
Dept: FAMILY MEDICINE | Facility: CLINIC | Age: 48
End: 2018-11-29

## 2018-11-29 NOTE — TELEPHONE ENCOUNTER
----- Message from Spenser Celeste sent at 11/29/2018  8:49 AM CST -----  Contact: self/[phone]  Patient would like to be seen for a sooner appointment.  She prefers next Tuesday 12/04/18 anytime before 1pm.    Please call after 3:30 pm and advise.  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

## 2018-11-30 ENCOUNTER — TELEPHONE (OUTPATIENT)
Dept: FAMILY MEDICINE | Facility: CLINIC | Age: 48
End: 2018-11-30

## 2018-11-30 NOTE — TELEPHONE ENCOUNTER
----- Message from Sherry Greenberg sent at 11/29/2018  4:55 PM CST -----  Contact: self, [phone]  Patient called in returning your call. Please advise.

## 2018-12-11 ENCOUNTER — OFFICE VISIT (OUTPATIENT)
Dept: FAMILY MEDICINE | Facility: CLINIC | Age: 48
End: 2018-12-11
Payer: COMMERCIAL

## 2018-12-11 VITALS
BODY MASS INDEX: 31.3 KG/M2 | WEIGHT: 206.56 LBS | HEIGHT: 68 IN | HEART RATE: 72 BPM | DIASTOLIC BLOOD PRESSURE: 68 MMHG | OXYGEN SATURATION: 97 % | SYSTOLIC BLOOD PRESSURE: 110 MMHG | TEMPERATURE: 98 F

## 2018-12-11 DIAGNOSIS — F90.2 ATTENTION DEFICIT HYPERACTIVITY DISORDER (ADHD), COMBINED TYPE: ICD-10-CM

## 2018-12-11 DIAGNOSIS — Z23 NEED FOR INFLUENZA VACCINATION: ICD-10-CM

## 2018-12-11 DIAGNOSIS — R10.9 ABDOMINAL SPASMS: Primary | ICD-10-CM

## 2018-12-11 DIAGNOSIS — F41.9 ANXIETY: ICD-10-CM

## 2018-12-11 PROCEDURE — 90686 IIV4 VACC NO PRSV 0.5 ML IM: CPT | Mod: S$GLB,,, | Performed by: FAMILY MEDICINE

## 2018-12-11 PROCEDURE — 99999 PR PBB SHADOW E&M-EST. PATIENT-LVL III: CPT | Mod: PBBFAC,,, | Performed by: FAMILY MEDICINE

## 2018-12-11 PROCEDURE — 99214 OFFICE O/P EST MOD 30 MIN: CPT | Mod: 25,S$GLB,, | Performed by: FAMILY MEDICINE

## 2018-12-11 PROCEDURE — 3008F BODY MASS INDEX DOCD: CPT | Mod: CPTII,S$GLB,, | Performed by: FAMILY MEDICINE

## 2018-12-11 PROCEDURE — 90471 IMMUNIZATION ADMIN: CPT | Mod: S$GLB,,, | Performed by: FAMILY MEDICINE

## 2018-12-11 RX ORDER — LISDEXAMFETAMINE DIMESYLATE 40 MG/1
40 CAPSULE ORAL DAILY
Qty: 30 CAPSULE | Refills: 0 | Status: SHIPPED | OUTPATIENT
Start: 2018-12-11 | End: 2019-01-15 | Stop reason: SDUPTHER

## 2018-12-11 RX ORDER — ESCITALOPRAM OXALATE 20 MG/1
20 TABLET ORAL DAILY
Qty: 30 TABLET | Refills: 2 | Status: SHIPPED | OUTPATIENT
Start: 2018-12-11 | End: 2019-01-15 | Stop reason: SDUPTHER

## 2018-12-11 NOTE — PROGRESS NOTES
HPI:  Hayley Montana is a 48 y.o. year old female that  presents with continued complaint of pulsating in her abdomen. She had one in her upper right quadrant that was a new location than before. She states that she can feel it and see it when it occurs. She describes it as looking as a tube visible through her skin. She tried to reproduce it here in the office by contorting her abdomen in flexure but was not able to.  Chief Complaint   Patient presents with    Results     F/U Labs    ADHD     discuss changing mg    Flu Vaccine   .     HPI      Past Medical History:   Diagnosis Date    General anesthetics causing adverse effect in therapeutic use      Social History     Socioeconomic History    Marital status:      Spouse name: Not on file    Number of children: Not on file    Years of education: Not on file    Highest education level: Not on file   Social Needs    Financial resource strain: Not on file    Food insecurity - worry: Not on file    Food insecurity - inability: Not on file    Transportation needs - medical: Not on file    Transportation needs - non-medical: Not on file   Occupational History    Not on file   Tobacco Use    Smoking status: Never Smoker    Smokeless tobacco: Never Used   Substance and Sexual Activity    Alcohol use: Yes     Comment: social    Drug use: No    Sexual activity: Not on file   Other Topics Concern    Not on file   Social History Narrative    Not on file     Past Surgical History:   Procedure Laterality Date    ARTHROSCOPY-SHOULDER Right 3/27/2018    [facility] OR    ARTHROSCOPY-SHOULDER Right 2018    [facility]    ARTHROSCOPY-SHOULDER WITH SUBACROMIAL DECOMPRESSION W/ ALLOGRAFT AUGMENTATION Right 2018    [facility]     SECTION      x2    COLONOSCOPY  2018    Colonoscopy Dr. Wolf Marsh-- Recomended colonoscopy in 10 years.   "   COSMETIC SURGERY      breast lift tummy tuck    HYSTERECTOMY      REPAIR-ROTATOR CUFF Right 3/27/2018    [facility]    REPAIR-ROTATOR CUFF Right 1/12/2018    [facility]    REPAIR-TENDON-BICEP BICEPS TENOTOMY ARTHROSCOPY Right 1/12/2018    [facility] OR    SHOULDER ARTHROSCOPY      TONSILLECTOMY       Family History   Problem Relation Age of Onset    Neuropathy Mother     Hypertension Mother     Pancreatic cancer Father     Diabetes Father     Thyroid disease Sister     Hernia Sister     Scleroderma Sister     Diabetes Brother     No Known Problems Daughter     Asthma Son     No Known Problems Sister            Review of Systems  General ROS: negative for chills, fever or weight loss  ENT ROS: negative for epistaxis, sore throat or rhinorrhea  Respiratory ROS: no cough, shortness of breath, or wheezing  Cardiovascular ROS: no chest pain or dyspnea on exertion  Gastrointestinal ROS:  Positive abdominal wall spasm, change in bowel habits, or black/ bloody stools    Physical Exam:  /68 (BP Location: Left arm, Patient Position: Sitting, BP Method: Large (Manual))   Pulse 72   Temp 98.2 °F (36.8 °C) (Oral)   Ht 5' 8" (1.727 m)   Wt 93.7 kg (206 lb 9.1 oz)   LMP 11/07/2016 (Approximate)   SpO2 97%   BMI 31.41 kg/m²   General appearance: alert, cooperative, no distress  Constitutional:Oriented to person, place, and time.appears well-developed and well-nourished.  Lungs: clear to auscultation bilaterally, no dullness to percussion bilaterally  Heart: regular rate and rhythm without rub; no displacement of the PMI , S1&S2 present  Abdomen: soft, non-tender; bowel sounds normoactive; no organomegaly  Physical Exam    LABS:    Complete Blood Count  No results found for: RBC, HGB, HCT, MCV, MCH, MCHC, RDW, PLT, MPV, GRAN, LYMPH, MONO, EOS, BASO, GRAN, LYMPH, MONO, EOSINOPHIL, BASOPHIL, " CONTACT INFO  Mati Christine M.D., PGY-1  Pager: NS- [phone], [facility]- [phone]    Mon-Fri: pager covered by day team 7am-7pm;   ***Academic conferences M-F 8am-9am & 12pm-1pm- page ONLY if URGENT or if Consultant  /Rosario: see chart, primary physician assigned available 7am-12pm  Sat/Linda Cross Coverage 12pm-7pm: NS- page [phone] for Team1-4, [facility]- pager forwarded to covering Resident  For Night coverage 7pm-7am: NS- page [phone] Team1-3, page [phone] Team4 & Care Model    AMA NEGRETE  63y  Male      Patient is a 63y old  Male who presents with a chief complaint of Cough, SOB, fever (08 Jan 2018 12:08)      INTERVAL HPI/OVERNIGHT EVENTS: NAEON, received tylenol for low grade temperature. Family meeting yesterday with decision to have hospice evaluate patient for potential in patient hospice, family still deciding on code status.     REVIEW OF SYSTEMS:  Unable to obtain on non-verbal patient.     Vital Signs Last 24 Hrs  T(C): 37.8 (10 Pacheco 2018 04:42), Max: 37.8 (10 Pacheco 2018 04:42)  T(F): 100.1 (10 Pacheco 2018 04:42), Max: 100.1 (10 Pacheco 2018 04:42)  HR: 104 (10 Pacheco 2018 04:42) (100 - 104)  BP: 131/85 (10 Pacheco 2018 04:42) (115/74 - 148/94)  BP(mean): --  RR: 17 (10 Pacheco 2018 04:42) (17 - 17)  SpO2: 95% (10 Pacheco 2018 04:42) (94% - 96%)    PHYSICAL EXAM:  GENERAL: NAD  HEAD:  Atraumatic  ENMT: Moist mucous membranes  NERVOUS SYSTEM:  Alert & Oriented X0, awake but not regarding this AM, not cooperating with strength or sensation exams.   CHEST/LUNG: Crackles over left lung anteriorly, no wheeze  HEART: RRR, no m/r/g  ABDOMEN: +BSx4, soft, non-tender  EXTREMITIES: Warm, no edema  SKIN: No rashes    Consultant(s) Notes Reviewed:  palliative    LABS:                        8.0    12.57 )-----------( 411      ( 09 Jan 2018 09:07 )             24.5     01-09    131<L>  |  94<L>  |  10  ----------------------------<  141<H>  4.3   |  27  |  0.55    Ca    8.3<L>      09 Jan 2018 09:38  Phos  2.4     01-09  Mg     1.9     01-09 DIFFMETHOD    Comprehensive Metabolic Panel  No results found for: GLU, BUN, CREATININE, NA, K, CL, PROT, ALBUMIN, BILITOT, AST, ALKPHOS, CO2, ALT, ANIONGAP, EGFRNONAA, ESTGFRAFRICA    LIPID  No results found for: CHOL, HDL      TSH  No results found for: TSH    Current Outpatient Medications   Medication Sig Dispense Refill    escitalopram oxalate (LEXAPRO) 20 MG tablet Take 1 tablet (20 mg total) by mouth once daily. 30 tablet 2    lisdexamfetamine (VYVANSE) 40 MG Cap Take 1 capsule (40 mg total) by mouth once daily. 30 capsule 0    omeprazole (PRILOSEC) 20 MG capsule Take 20 mg by mouth every morning.  3     No current facility-administered medications for this visit.        Assessment:    ICD-10-CM ICD-9-CM    1. Abdominal spasms R10.9 789.00    2. Attention deficit hyperactivity disorder (ADHD), combined type F90.2 314.01 lisdexamfetamine (VYVANSE) 40 MG Cap   3. Anxiety F41.9 300.00 escitalopram oxalate (LEXAPRO) 20 MG tablet   4. Need for influenza vaccination Z23 V04.81 Influenza - Quadrivalent (3 years & older) (PF)         Plan:  With no deficiency found for cause of of abdominal spasm we will continue to watch for additional signs and resolution.  May be secondary to anxiety and the better control of anxiety with daily taking of her Lexapro as well as increasing Vyvanse will help.  Will give a trial of a increase in Vyvanse to 40 mg daily.  Patient was informed that she needs take her Lexapro every day for it to work correctly.    Follow-up in 1 month (on 1/11/2019).          Leann Celeste MD

## 2019-01-15 ENCOUNTER — OFFICE VISIT (OUTPATIENT)
Dept: FAMILY MEDICINE | Facility: CLINIC | Age: 49
End: 2019-01-15
Payer: COMMERCIAL

## 2019-01-15 VITALS
BODY MASS INDEX: 31.57 KG/M2 | WEIGHT: 208.31 LBS | OXYGEN SATURATION: 96 % | DIASTOLIC BLOOD PRESSURE: 76 MMHG | HEART RATE: 72 BPM | HEIGHT: 68 IN | TEMPERATURE: 98 F | SYSTOLIC BLOOD PRESSURE: 106 MMHG

## 2019-01-15 DIAGNOSIS — N64.9 BREAST LESION: Primary | ICD-10-CM

## 2019-01-15 DIAGNOSIS — F90.2 ATTENTION DEFICIT HYPERACTIVITY DISORDER (ADHD), COMBINED TYPE: ICD-10-CM

## 2019-01-15 DIAGNOSIS — F41.9 ANXIETY: ICD-10-CM

## 2019-01-15 PROCEDURE — 3008F BODY MASS INDEX DOCD: CPT | Mod: CPTII,S$GLB,, | Performed by: FAMILY MEDICINE

## 2019-01-15 PROCEDURE — 99999 PR PBB SHADOW E&M-EST. PATIENT-LVL IV: CPT | Mod: PBBFAC,,, | Performed by: FAMILY MEDICINE

## 2019-01-15 PROCEDURE — 3008F PR BODY MASS INDEX (BMI) DOCUMENTED: ICD-10-PCS | Mod: CPTII,S$GLB,, | Performed by: FAMILY MEDICINE

## 2019-01-15 PROCEDURE — 99214 PR OFFICE/OUTPT VISIT, EST, LEVL IV, 30-39 MIN: ICD-10-PCS | Mod: S$GLB,,, | Performed by: FAMILY MEDICINE

## 2019-01-15 PROCEDURE — 99999 PR PBB SHADOW E&M-EST. PATIENT-LVL IV: ICD-10-PCS | Mod: PBBFAC,,, | Performed by: FAMILY MEDICINE

## 2019-01-15 PROCEDURE — 99214 OFFICE O/P EST MOD 30 MIN: CPT | Mod: S$GLB,,, | Performed by: FAMILY MEDICINE

## 2019-01-15 RX ORDER — ESCITALOPRAM OXALATE 20 MG/1
TABLET ORAL
COMMUNITY
End: 2019-01-15 | Stop reason: SDUPTHER

## 2019-01-15 RX ORDER — LISDEXAMFETAMINE DIMESYLATE 40 MG/1
40 CAPSULE ORAL DAILY
Qty: 30 CAPSULE | Refills: 0 | Status: SHIPPED | OUTPATIENT
Start: 2019-01-15 | End: 2019-02-19 | Stop reason: SDUPTHER

## 2019-01-15 RX ORDER — LISDEXAMFETAMINE DIMESYLATE 30 MG/1
CAPSULE ORAL
COMMUNITY
End: 2019-01-15

## 2019-01-18 ENCOUNTER — TELEPHONE (OUTPATIENT)
Dept: FAMILY MEDICINE | Facility: CLINIC | Age: 49
End: 2019-01-18

## 2019-01-18 RX ORDER — DICLOXACILLIN SODIUM 500 MG/1
500 CAPSULE ORAL 3 TIMES DAILY
Qty: 30 CAPSULE | Refills: 0 | Status: SHIPPED | OUTPATIENT
Start: 2019-01-18 | End: 2019-01-28

## 2019-01-18 RX ORDER — ESCITALOPRAM OXALATE 20 MG/1
20 TABLET ORAL DAILY
Qty: 30 TABLET | Refills: 2 | Status: SHIPPED | OUTPATIENT
Start: 2019-01-18 | End: 2019-04-26 | Stop reason: SDUPTHER

## 2019-01-18 NOTE — TELEPHONE ENCOUNTER
----- Message from Deborah Rojas sent at 1/18/2019  4:19 PM CST -----  Contact: [phone]  Patient requested to speak with the nurse about the Rx for antibiotic the doctor was supposed to give her after appt. Please call.

## 2019-01-20 NOTE — PROGRESS NOTES
HPI:  Hayley Montana is a 48 y.o. year old female that  presents with concern about breast rash that is improved.  Patient denies any new contacts or new medications.  She began to have lesions on her left breast that were draining and bolus.  She has been applying antibiotic ointment and into each medication.  She also needs refill on her ADHD medication  Chief Complaint   Patient presents with    ADHD    Rash     on breast   .           Past Medical History:   Diagnosis Date    General anesthetics causing adverse effect in therapeutic use      Social History     Socioeconomic History    Marital status:      Spouse name: Not on file    Number of children: Not on file    Years of education: Not on file    Highest education level: Not on file   Social Needs    Financial resource strain: Not on file    Food insecurity - worry: Not on file    Food insecurity - inability: Not on file    Transportation needs - medical: Not on file    Transportation needs - non-medical: Not on file   Occupational History    Not on file   Tobacco Use    Smoking status: Never Smoker    Smokeless tobacco: Never Used   Substance and Sexual Activity    Alcohol use: Yes     Comment: social    Drug use: No    Sexual activity: Not on file   Other Topics Concern    Not on file   Social History Narrative    Not on file     Past Surgical History:   Procedure Laterality Date    ARTHROSCOPY-SHOULDER Right 3/27/2018    [facility]    ARTHROSCOPY-SHOULDER Right 2018    [facility]    ARTHROSCOPY-SHOULDER WITH SUBACROMIAL DECOMPRESSION W/ ALLOGRAFT AUGMENTATION Right 2018    [facility]     SECTION      x2    COLONOSCOPY  2018    Colonoscopy Dr. Wolf Marsh-- Recomended colonoscopy in 10 years.     COSMETIC SURGERY      breast lift tummy tuck    HYSTERECTOMY      REPAIR-ROTATOR CUFF Right 3/27/2018     "[facility]    REPAIR-ROTATOR CUFF Right 1/12/2018    [facility]    REPAIR-TENDON-BICEP BICEPS TENOTOMY ARTHROSCOPY Right 1/12/2018    [facility]    SHOULDER ARTHROSCOPY      TONSILLECTOMY       Family History   Problem Relation Age of Onset    Neuropathy Mother     Hypertension Mother     Pancreatic cancer Father     Diabetes Father     Thyroid disease Sister     Hernia Sister     Scleroderma Sister     Diabetes Brother     No Known Problems Daughter     Asthma Son     No Known Problems Sister            Review of Systems  General ROS: negative for chills, fever or weight loss  ENT ROS: negative for epistaxis, sore throat or rhinorrhea  Respiratory ROS: no cough, shortness of breath, or wheezing  Cardiovascular ROS: no chest pain or dyspnea on exertion  Gastrointestinal ROS: no abdominal pain, change in bowel habits, or black/ bloody stools    Physical Exam:  /76 (BP Location: Right arm, Patient Position: Sitting, BP Method: Large (Manual))   Pulse 72   Temp 98 °F (36.7 °C) (Oral)   Ht 5' 8" (1.727 m)   Wt 94.5 kg (208 lb 5.4 oz)   LMP 11/07/2016 (Approximate)   SpO2 96%   BMI 31.68 kg/m²   General appearance: alert, cooperative, no distress  Constitutional:Oriented to person, place, and time.appears well-developed and well-nourished.  HEENT: Normocephalic, atraumatic, neck symmetrical, no nasal discharge, TM- clear bilaterally  Lungs: clear to auscultation bilaterally, no dullness to percussion bilaterally  Heart: regular rate and rhythm without rub; no displacement of the PMI , S1&S2 present  Abdomen: soft, non-tender; bowel sounds normoactive; no organomegaly  Skin:  Left breast with multiple skin lesions which are healed and not draining, positive granulation tissue noted., no mass palpated, no edema noted  Physical Exam      LABS:    Complete Blood Count  No results found for: RBC, HGB, " HCT, MCV, MCH, MCHC, RDW, PLT, MPV, GRAN, LYMPH, MONO, EOS, BASO, GRAN, LYMPH, MONO, EOSINOPHIL, BASOPHIL, DIFFMETHOD    Comprehensive Metabolic Panel  No results found for: GLU, BUN, CREATININE, NA, K, CL, PROT, ALBUMIN, BILITOT, AST, ALKPHOS, CO2, ALT, ANIONGAP, EGFRNONAA, ESTGFRAFRICA    LIPID  No results found for: CHOL, HDL      TSH  No results found for: TSH    Current Outpatient Medications   Medication Sig Dispense Refill    escitalopram oxalate (LEXAPRO) 20 MG tablet Take 1 tablet (20 mg total) by mouth once daily. 30 tablet 2    lisdexamfetamine (VYVANSE) 40 MG Cap Take 1 capsule (40 mg total) by mouth once daily. 30 capsule 0    omeprazole (PRILOSEC) 20 MG capsule Take 20 mg by mouth every morning.  3    dicloxacillin (DYNAPEN) 500 MG capsule Take 1 capsule (500 mg total) by mouth 3 (three) times daily. for 10 days 30 capsule 0     No current facility-administered medications for this visit.        Assessment:    ICD-10-CM ICD-9-CM    1. Breast lesion N64.9 611.9 US Breast Left Complete      dicloxacillin (DYNAPEN) 500 MG capsule   2. Attention deficit hyperactivity disorder (ADHD), combined type F90.2 314.01 lisdexamfetamine (VYVANSE) 40 MG Cap   3. Anxiety F41.9 300.00 escitalopram oxalate (LEXAPRO) 20 MG tablet         Plan:    Follow-up in 4 weeks (on 2/13/2019).          Leann Celeste MD

## 2019-01-29 ENCOUNTER — TELEPHONE (OUTPATIENT)
Dept: FAMILY MEDICINE | Facility: CLINIC | Age: 49
End: 2019-01-29

## 2019-01-29 NOTE — TELEPHONE ENCOUNTER
----- Message from Bailee Castanon sent at 1/29/2019  3:35 PM CST -----  Contact: Self [phone]  Patient is returning your call. Patient is healing with the antibiotics but is not healed yet   Please advise.

## 2019-02-07 ENCOUNTER — TELEPHONE (OUTPATIENT)
Dept: FAMILY MEDICINE | Facility: CLINIC | Age: 49
End: 2019-02-07

## 2019-02-07 NOTE — TELEPHONE ENCOUNTER
----- Message from Va Huertas sent at 2/7/2019  8:17 AM CST -----  Contact: [phone]/self  Patient requesting to speak with you regarding rescheduling her appointment for a later time on 02/13/2019 or a different day. Please advise.'

## 2019-02-15 PROBLEM — K62.5 RECTAL HEMORRHAGE: Status: ACTIVE | Noted: 2019-02-15

## 2019-02-15 PROBLEM — K64.4 EXTERNAL HEMORRHOIDS: Status: ACTIVE | Noted: 2019-02-15

## 2019-02-19 ENCOUNTER — OFFICE VISIT (OUTPATIENT)
Dept: FAMILY MEDICINE | Facility: CLINIC | Age: 49
End: 2019-02-19
Payer: COMMERCIAL

## 2019-02-19 VITALS
SYSTOLIC BLOOD PRESSURE: 118 MMHG | BODY MASS INDEX: 30.46 KG/M2 | RESPIRATION RATE: 18 BRPM | HEART RATE: 64 BPM | OXYGEN SATURATION: 98 % | HEIGHT: 68 IN | DIASTOLIC BLOOD PRESSURE: 68 MMHG | TEMPERATURE: 98 F | WEIGHT: 201 LBS

## 2019-02-19 DIAGNOSIS — Z23 NEED FOR TDAP VACCINATION: ICD-10-CM

## 2019-02-19 DIAGNOSIS — N64.9 BREAST LESION: ICD-10-CM

## 2019-02-19 DIAGNOSIS — F90.2 ATTENTION DEFICIT HYPERACTIVITY DISORDER (ADHD), COMBINED TYPE: Primary | ICD-10-CM

## 2019-02-19 PROCEDURE — 90471 IMMUNIZATION ADMIN: CPT | Mod: S$GLB,,, | Performed by: FAMILY MEDICINE

## 2019-02-19 PROCEDURE — 99214 OFFICE O/P EST MOD 30 MIN: CPT | Mod: 25,S$GLB,, | Performed by: FAMILY MEDICINE

## 2019-02-19 PROCEDURE — 3008F BODY MASS INDEX DOCD: CPT | Mod: CPTII,S$GLB,, | Performed by: FAMILY MEDICINE

## 2019-02-19 PROCEDURE — 90715 TDAP VACCINE 7 YRS/> IM: CPT | Mod: S$GLB,,, | Performed by: FAMILY MEDICINE

## 2019-02-19 PROCEDURE — 99999 PR PBB SHADOW E&M-EST. PATIENT-LVL III: CPT | Mod: PBBFAC,,, | Performed by: FAMILY MEDICINE

## 2019-02-19 PROCEDURE — 99999 PR PBB SHADOW E&M-EST. PATIENT-LVL III: ICD-10-PCS | Mod: PBBFAC,,, | Performed by: FAMILY MEDICINE

## 2019-02-19 PROCEDURE — 90471 TDAP VACCINE GREATER THAN OR EQUAL TO 7YO IM: ICD-10-PCS | Mod: S$GLB,,, | Performed by: FAMILY MEDICINE

## 2019-02-19 PROCEDURE — 3008F PR BODY MASS INDEX (BMI) DOCUMENTED: ICD-10-PCS | Mod: CPTII,S$GLB,, | Performed by: FAMILY MEDICINE

## 2019-02-19 PROCEDURE — 90715 TDAP VACCINE GREATER THAN OR EQUAL TO 7YO IM: ICD-10-PCS | Mod: S$GLB,,, | Performed by: FAMILY MEDICINE

## 2019-02-19 PROCEDURE — 99214 PR OFFICE/OUTPT VISIT, EST, LEVL IV, 30-39 MIN: ICD-10-PCS | Mod: 25,S$GLB,, | Performed by: FAMILY MEDICINE

## 2019-02-19 RX ORDER — LISDEXAMFETAMINE DIMESYLATE 40 MG/1
40 CAPSULE ORAL DAILY
Qty: 30 CAPSULE | Refills: 0 | Status: SHIPPED | OUTPATIENT
Start: 2019-02-19 | End: 2019-04-26 | Stop reason: SDUPTHER

## 2019-02-19 NOTE — PROGRESS NOTES
HPI:  Hayley Montana is a 48 y.o. year old female that  Presents for f/u of left breast lesion and Vyvanse refill. It Is healing with a scab over it. . She has complesed her abx. .  Will refill today.  No challenges with current dosage of Vyvanse  Chief Complaint   Patient presents with    Medication Refill     vyvanse    Follow-up     mammo results--breast lesion   .           Past Medical History:   Diagnosis Date    General anesthetics causing adverse effect in therapeutic use      Social History     Socioeconomic History    Marital status:      Spouse name: Not on file    Number of children: Not on file    Years of education: Not on file    Highest education level: Not on file   Social Needs    Financial resource strain: Not on file    Food insecurity - worry: Not on file    Food insecurity - inability: Not on file    Transportation needs - medical: Not on file    Transportation needs - non-medical: Not on file   Occupational History    Not on file   Tobacco Use    Smoking status: Never Smoker    Smokeless tobacco: Never Used   Substance and Sexual Activity    Alcohol use: Yes     Comment: social    Drug use: No    Sexual activity: Not on file   Other Topics Concern    Not on file   Social History Narrative    Not on file     Past Surgical History:   Procedure Laterality Date    ARTHROSCOPY-SHOULDER Right 3/27/2018    [facility]    ARTHROSCOPY-SHOULDER Right 2018    [facility]    ARTHROSCOPY-SHOULDER WITH SUBACROMIAL DECOMPRESSION W/ ALLOGRAFT AUGMENTATION Right 2018    [facility] OR     SECTION      x2    COLONOSCOPY  2018    Colonoscopy Dr. Wolf Marsh-- Recomended colonoscopy in 10 years.     COSMETIC SURGERY      breast lift tummy tuck    HYSTERECTOMY      REPAIR-ROTATOR CUFF Right 3/27/2018    [facility]    REPAIR-ROTATOR  "CUFF Right 1/12/2018    [facility]    REPAIR-TENDON-BICEP BICEPS TENOTOMY ARTHROSCOPY Right 1/12/2018    [facility]    SHOULDER ARTHROSCOPY      TONSILLECTOMY      TOTAL REDUCTION MAMMOPLASTY Bilateral 2006     Family History   Problem Relation Age of Onset    Neuropathy Mother     Hypertension Mother     Pancreatic cancer Father     Diabetes Father     Thyroid disease Sister     Hernia Sister     Scleroderma Sister     Diabetes Brother     No Known Problems Daughter     Asthma Son     No Known Problems Sister            Review of Systems  General ROS: negative for chills, fever or weight loss  ENT ROS: negative for epistaxis, sore throat or rhinorrhea  Respiratory ROS: no cough, shortness of breath, or wheezing  Cardiovascular ROS: no chest pain or dyspnea on exertion  Gastrointestinal ROS: no abdominal pain, change in bowel habits, or black/ bloody stools  Skin:  Healing breast lesion, no drainage  Physical Exam:  /68   Pulse 64   Temp 98.3 °F (36.8 °C) (Oral)   Resp 18   Ht 5' 8" (1.727 m)   Wt 91.2 kg (201 lb)   LMP 11/07/2016 (Approximate)   SpO2 98%   BMI 30.56 kg/m²   General appearance: alert, cooperative, no distress  Constitutional:Oriented to person, place, and time.appears well-developed and well-nourished.  HEENT: Normocephalic, atraumatic, neck symmetrical, no nasal discharge, TM- clear bilaterally  Lungs: clear to auscultation bilaterally, no dullness to percussion bilaterally  Heart: regular rate and rhythm without rub; no displacement of the PMI , S1&S2 present  Abdomen: soft, non-tender; bowel sounds normoactive; no organomegaly  Skin:  Progress lesion on left breast healing without signs of infection, no drainage mild erythema with good granulation  Physical Exam      LABS:    Complete Blood Count  No results found for: RBC, HGB, HCT, MCV, MCH, MCHC, RDW, PLT, MPV, GRAN, LYMPH, MONO, EOS, BASO, GRAN, " LYMPH, MONO, EOSINOPHIL, BASOPHIL, DIFFMETHOD    Comprehensive Metabolic Panel  No results found for: GLU, BUN, CREATININE, NA, K, CL, PROT, ALBUMIN, BILITOT, AST, ALKPHOS, CO2, ALT, ANIONGAP, EGFRNONAA, ESTGFRAFRICA    LIPID  No results found for: CHOL, HDL      TSH  No results found for: TSH    Current Outpatient Medications   Medication Sig Dispense Refill    escitalopram oxalate (LEXAPRO) 20 MG tablet Take 1 tablet (20 mg total) by mouth once daily. 30 tablet 2    lisdexamfetamine (VYVANSE) 40 MG Cap Take 1 capsule (40 mg total) by mouth once daily. 30 capsule 0    omeprazole (PRILOSEC) 20 MG capsule Take 20 mg by mouth every morning.  3     No current facility-administered medications for this visit.        Assessment:    ICD-10-CM ICD-9-CM    1. Attention deficit hyperactivity disorder (ADHD), combined type F90.2 314.01 lisdexamfetamine (VYVANSE) 40 MG Cap   2. Need for Tdap vaccination Z23 V06.1 (In Office Administered) Tdap Vaccine   3. Breast lesion N64.9 611.9          Plan:  Patient needs no further follow-up in office for breast lesion.  Instructed to continue to keep it clean and return office if signs swelling pain or discharge begin.  Follow-up in about 1 month (around 3/19/2019).          Leann Celeste MD

## 2019-04-12 ENCOUNTER — PATIENT OUTREACH (OUTPATIENT)
Dept: ADMINISTRATIVE | Facility: HOSPITAL | Age: 49
End: 2019-04-12

## 2019-04-12 DIAGNOSIS — Z13.220 ENCOUNTER FOR LIPID SCREENING FOR CARDIOVASCULAR DISEASE: Primary | ICD-10-CM

## 2019-04-12 DIAGNOSIS — Z13.6 ENCOUNTER FOR LIPID SCREENING FOR CARDIOVASCULAR DISEASE: Primary | ICD-10-CM

## 2019-04-26 ENCOUNTER — OFFICE VISIT (OUTPATIENT)
Dept: FAMILY MEDICINE | Facility: CLINIC | Age: 49
End: 2019-04-26
Payer: COMMERCIAL

## 2019-04-26 VITALS
BODY MASS INDEX: 30.1 KG/M2 | RESPIRATION RATE: 18 BRPM | SYSTOLIC BLOOD PRESSURE: 132 MMHG | HEIGHT: 68 IN | DIASTOLIC BLOOD PRESSURE: 88 MMHG | HEART RATE: 78 BPM | WEIGHT: 198.63 LBS | OXYGEN SATURATION: 96 % | TEMPERATURE: 98 F

## 2019-04-26 DIAGNOSIS — Z13.220 SCREENING CHOLESTEROL LEVEL: ICD-10-CM

## 2019-04-26 DIAGNOSIS — F90.2 ATTENTION DEFICIT HYPERACTIVITY DISORDER (ADHD), COMBINED TYPE: Primary | ICD-10-CM

## 2019-04-26 DIAGNOSIS — F41.9 ANXIETY: ICD-10-CM

## 2019-04-26 PROCEDURE — 99214 OFFICE O/P EST MOD 30 MIN: CPT | Mod: S$GLB,,, | Performed by: FAMILY MEDICINE

## 2019-04-26 PROCEDURE — 99214 PR OFFICE/OUTPT VISIT, EST, LEVL IV, 30-39 MIN: ICD-10-PCS | Mod: S$GLB,,, | Performed by: FAMILY MEDICINE

## 2019-04-26 PROCEDURE — 3008F PR BODY MASS INDEX (BMI) DOCUMENTED: ICD-10-PCS | Mod: CPTII,S$GLB,, | Performed by: FAMILY MEDICINE

## 2019-04-26 PROCEDURE — 99999 PR PBB SHADOW E&M-EST. PATIENT-LVL III: ICD-10-PCS | Mod: PBBFAC,,, | Performed by: FAMILY MEDICINE

## 2019-04-26 PROCEDURE — 99999 PR PBB SHADOW E&M-EST. PATIENT-LVL III: CPT | Mod: PBBFAC,,, | Performed by: FAMILY MEDICINE

## 2019-04-26 PROCEDURE — 3008F BODY MASS INDEX DOCD: CPT | Mod: CPTII,S$GLB,, | Performed by: FAMILY MEDICINE

## 2019-04-26 RX ORDER — LISDEXAMFETAMINE DIMESYLATE 40 MG/1
40 CAPSULE ORAL DAILY
Qty: 30 CAPSULE | Refills: 0 | Status: SHIPPED | OUTPATIENT
Start: 2019-05-26 | End: 2019-07-16 | Stop reason: SDUPTHER

## 2019-04-26 RX ORDER — LISDEXAMFETAMINE DIMESYLATE 40 MG/1
40 CAPSULE ORAL DAILY
Qty: 30 CAPSULE | Refills: 0 | Status: SHIPPED | OUTPATIENT
Start: 2019-04-26 | End: 2019-04-26 | Stop reason: SDUPTHER

## 2019-04-26 RX ORDER — ESCITALOPRAM OXALATE 20 MG/1
20 TABLET ORAL DAILY
Qty: 30 TABLET | Refills: 2 | Status: SHIPPED | OUTPATIENT
Start: 2019-04-26 | End: 2019-05-23 | Stop reason: ALTCHOICE

## 2019-04-26 NOTE — PROGRESS NOTES
HPI:  Hayley Montana is a 49 y.o. year old female that presents for refill for her ADHD. She denies any problem with sleep or appetite. Pt recently started a new job and does not get days off. It will be difficult for her to come to her appointments.  Chief Complaint   Patient presents with    ADHD     refill on medication/patient wanted if she can come every 3 mths for refill   .           Past Medical History:   Diagnosis Date    ADHD (attention deficit hyperactivity disorder)     Anxiety     Depression     General anesthetics causing adverse effect in therapeutic use      Social History     Socioeconomic History    Marital status:      Spouse name: Not on file    Number of children: Not on file    Years of education: Not on file    Highest education level: Not on file   Occupational History    Not on file   Social Needs    Financial resource strain: Not on file    Food insecurity:     Worry: Not on file     Inability: Not on file    Transportation needs:     Medical: Not on file     Non-medical: Not on file   Tobacco Use    Smoking status: Never Smoker    Smokeless tobacco: Never Used   Substance and Sexual Activity    Alcohol use: Yes     Comment: social    Drug use: No    Sexual activity: Not on file   Lifestyle    Physical activity:     Days per week: Not on file     Minutes per session: Not on file    Stress: Not on file   Relationships    Social connections:     Talks on phone: Not on file     Gets together: Not on file     Attends [employer] service: Not on file     Active member of club or organization: Not on file     Attends meetings of clubs or organizations: Not on file     Relationship status: Not on file   Other Topics Concern    Not on file   Social History Narrative    Not on file     Past Surgical History:   Procedure Laterality Date    ARTHROSCOPY-SHOULDER Right 3/27/2018    [facility]    ARTHROSCOPY-SHOULDER Right 1/12/2018     "[facility]    ARTHROSCOPY-SHOULDER WITH SUBACROMIAL DECOMPRESSION W/ ALLOGRAFT AUGMENTATION Right 2018    [facility]     SECTION      x2    COLONOSCOPY  2018    Colonoscopy Dr. Wolf Marsh-- Recomended colonoscopy in 10 years.     COSMETIC SURGERY      breast lift tummy tuck    HYSTERECTOMY      REPAIR-ROTATOR CUFF Right 3/27/2018    [facility]    REPAIR-ROTATOR CUFF Right 2018    [facility]    REPAIR-TENDON-BICEP BICEPS TENOTOMY ARTHROSCOPY Right 2018    [facility]    SHOULDER ARTHROSCOPY      TONSILLECTOMY      TOTAL REDUCTION MAMMOPLASTY Bilateral      Family History   Problem Relation Age of Onset    Neuropathy Mother     Hypertension Mother     Pancreatic cancer Father     Diabetes Father     Thyroid disease Sister     Hernia Sister     Scleroderma Sister     Diabetes Brother     No Known Problems Daughter     Asthma Son     No Known Problems Sister            Review of Systems  General ROS: negative for chills, fever or weight loss  ENT ROS: negative for epistaxis, sore throat or rhinorrhea  Respiratory ROS: no cough, shortness of breath, or wheezing  Cardiovascular ROS: no chest pain or dyspnea on exertion  Gastrointestinal ROS: no abdominal pain, change in bowel habits, or black/ bloody stools    Physical Exam:  /88 (BP Location: Right arm, Patient Position: Sitting, BP Method: Large (Manual))   Pulse 78   Temp 98.3 °F (36.8 °C) (Oral)   Resp 18   Ht 5' 8" (1.727 m)   Wt 90.1 kg (198 lb 10.2 oz)   LMP 2016   SpO2 96%   BMI 30.20 kg/m²   General appearance: alert, cooperative, no distress  Constitutional:Oriented to person, place, and time.appears well-developed and well-nourished.  Lungs: clear to auscultation bilaterally, no dullness to percussion bilaterally  Heart: " regular rate and rhythm without rub; no displacement of the PMI , S1&S2 present    Physical Exam      LABS:    Complete Blood Count  No results found for: RBC, HGB, HCT, MCV, MCH, MCHC, RDW, PLT, MPV, GRAN, LYMPH, MONO, EOS, BASO, GRAN, LYMPH, MONO, EOSINOPHIL, BASOPHIL, DIFFMETHOD    Comprehensive Metabolic Panel  No results found for: GLU, BUN, CREATININE, NA, K, CL, PROT, ALBUMIN, BILITOT, AST, ALKPHOS, CO2, ALT, ANIONGAP, EGFRNONAA, ESTGFRAFRICA    LIPID  No results found for: CHOL, HDL      TSH  No results found for: TSH    Current Outpatient Medications   Medication Sig Dispense Refill    escitalopram oxalate (LEXAPRO) 20 MG tablet Take 1 tablet (20 mg total) by mouth once daily. 30 tablet 2    [START ON 5/26/2019] lisdexamfetamine (VYVANSE) 40 MG Cap Take 1 capsule (40 mg total) by mouth once daily. 30 capsule 0     No current facility-administered medications for this visit.        Assessment:    ICD-10-CM ICD-9-CM    1. Attention deficit hyperactivity disorder (ADHD), combined type F90.2 314.01 lisdexamfetamine (VYVANSE) 40 MG Cap      DISCONTINUED: lisdexamfetamine (VYVANSE) 40 MG Cap   2. Anxiety F41.9 300.00 escitalopram oxalate (LEXAPRO) 20 MG tablet   3. Screening cholesterol level Z13.220 V77.91 Comprehensive metabolic panel      Lipid panel         Plan:    No follow-ups on file.          Leann Celeste MD

## 2019-05-10 ENCOUNTER — PATIENT MESSAGE (OUTPATIENT)
Dept: FAMILY MEDICINE | Facility: CLINIC | Age: 49
End: 2019-05-10

## 2019-05-10 DIAGNOSIS — F41.9 ANXIETY: Primary | ICD-10-CM

## 2019-05-23 RX ORDER — PAROXETINE 10 MG/1
10 TABLET, FILM COATED ORAL EVERY MORNING
Qty: 30 TABLET | Refills: 2 | Status: SHIPPED | OUTPATIENT
Start: 2019-05-23 | End: 2019-07-16

## 2019-05-30 ENCOUNTER — PATIENT OUTREACH (OUTPATIENT)
Dept: ADMINISTRATIVE | Facility: HOSPITAL | Age: 49
End: 2019-05-30

## 2019-06-14 ENCOUNTER — PATIENT MESSAGE (OUTPATIENT)
Dept: FAMILY MEDICINE | Facility: CLINIC | Age: 49
End: 2019-06-14

## 2019-07-12 PROBLEM — M75.121 COMPLETE TEAR OF RIGHT ROTATOR CUFF: Status: RESOLVED | Noted: 2018-03-27 | Resolved: 2019-07-12

## 2019-07-12 PROBLEM — F90.2 ATTENTION DEFICIT HYPERACTIVITY DISORDER (ADHD), COMBINED TYPE: Status: ACTIVE | Noted: 2019-07-12

## 2019-07-12 PROBLEM — K64.4 EXTERNAL HEMORRHOIDS: Status: RESOLVED | Noted: 2019-02-15 | Resolved: 2019-07-12

## 2019-07-12 PROBLEM — F41.9 ANXIETY: Status: ACTIVE | Noted: 2019-07-12

## 2019-07-12 PROBLEM — K62.5 RECTAL HEMORRHAGE: Status: RESOLVED | Noted: 2019-02-15 | Resolved: 2019-07-12

## 2019-07-12 PROBLEM — M75.121 COMPLETE ROTATOR CUFF TEAR OR RUPTURE OF RIGHT SHOULDER, NOT SPECIFIED AS TRAUMATIC: Status: RESOLVED | Noted: 2018-01-12 | Resolved: 2019-07-12

## 2019-07-12 NOTE — PROGRESS NOTES
FAMILY MEDICINE    Patient Active Problem List   Diagnosis    Attention deficit hyperactivity disorder (ADHD), combined type    Anxiety    First degree burn of multiple sites of right hand    Herpes labialis without complication    Irritable bowel syndrome with diarrhea       CC:   Chief Complaint   Patient presents with    Establish Care       HPI: Hayley Montana is a 49 y.o. female  - with anxiety, IBS (with cramping and alternative diarrhea. Colonoscopy up to date) and ADHD presents to establish care.. Her PCP Dr. Leann Celeste moved and she was previously with Dr. River Mendoza    1. ADHD combined type    Age diagnosed: about 2008  Diagnosed by: Psychologist (but does not have original records)  Initial evaluation present in chart: none  - reviewed Dr. Leann Rogers's noted who continued treatment from Dr. River Mendoza () [facility]  -  rviewed    Past medications: Vyvanse 30 mg daily, Adderall (did not tolerate due to mood issues)   Reasons for changing past medications: not as effective after transition of . 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 Leann Celeste  - doing well and no concerns     Concerns with medication: denies  AM medication: Vyvanse 40 mg daily  Lunch medication: none  Afternoon medication: none    Daily Activity: works at as a  in [employer]    2. Anxiety  Age diagnosed: 40's     Prior treatments: Paxil  Side effects of prior treatment: not effective    Current treatment:   Lexapro 20 mg daily    Side effects of current treatment: denies    Symptoms related: well controlled  Panic attacks: denies    Hopelessness: denies  Sleep: denies  Suicidal thoughts: denies  Thoughts of self harm:denies  Thoughts of harm to others: denies  History of suicide attempts: denies  Family  history of suicide:denies    Support system: friends   Counselor: none    3. Hand burn  Onset: 1 week ago  Location: right dorsal hand and fingers s/p washing her deck with Clorox and did not wear gloves  Duration: 1 weeks  Character: redness and irritation. No pain. Mild serous drainage. No swelling  Aggravating factors: denies  Relieving factors: Silvadene which she had left over from a previous injury and needs a refill  Associated symptoms: see above  Negative symptoms: denies fever, chills, pain, purulent drainage, swelling    4. Lip lesion  Onset: 2 weeks ago  Location: right lower [place]  Duration: constant  Character: ulceration and pain  Aggravating factors: movement of lips  Relieving factors: nothing and has tried some old Acyclovir cream that she had without relief                   HEALTH MAINTENANCE:   Health Maintenance   Topic Date Due    Lipid Panel  1969    Influenza Vaccine  08/01/2019    Mammogram  01/22/2020    Pap Smear  07/12/2021    Colonoscopy  08/06/2028    TETANUS VACCINE  02/19/2029       ROS: Review of Systems   Constitutional: Negative for activity change, appetite change, fatigue, fever and unexpected weight change.   HENT: Positive for mouth sores. Negative for congestion, ear discharge, ear pain, facial swelling, nosebleeds, postnasal drip, rhinorrhea, sinus pressure, sinus pain, trouble swallowing and voice change.    Eyes: Negative for photophobia, pain, discharge, redness, itching and visual disturbance.   Respiratory: Negative for cough, chest tightness and shortness of breath.    Cardiovascular: Negative for chest pain, palpitations and leg swelling.   Gastrointestinal: Positive for diarrhea (occasional). Negative for abdominal distention, abdominal pain, blood in stool, constipation, nausea and vomiting.   Endocrine: Negative for cold intolerance, heat intolerance, polydipsia, polyphagia and polyuria.   Genitourinary: Negative for difficulty urinating,  frequency and urgency.   Musculoskeletal: Negative for arthralgias and myalgias.   Skin: Negative for rash.   Allergic/Immunologic: Negative.    Neurological: Negative for dizziness, weakness, light-headedness and headaches.   Hematological: Negative.    Psychiatric/Behavioral: Negative for decreased concentration, dysphoric mood and sleep disturbance. The patient is nervous/anxious.        ALLERGIES:   Review of patient's allergies indicates:  No Known Allergies    MEDS:     Current Outpatient Medications:     escitalopram oxalate (LEXAPRO) 20 MG tablet, Take 1 tablet (20 mg total) by mouth once daily., Disp: 90 tablet, Rfl: 1    lisdexamfetamine (VYVANSE) 40 MG Cap, Take 1 capsule (40 mg total) by mouth once daily., Disp: 30 capsule, Rfl: 0    silver sulfADIAZINE 1% (SILVADENE) 1 % cream, Apply topically 2 (two) times daily., Disp: 85 g, Rfl: 0    valACYclovir (VALTREX) 1000 MG tablet, Take 1 tablet (1,000 mg total) by mouth daily as needed. For 5 days as needed for cold sore., Disp: 30 tablet, Rfl: 0    varicella-zoster gE-AS01B, PF, (SHINGRIX, PF,) 50 mcg/0.5 mL injection, Inject 0.5mL IM at 0 and 2-6 months, Disp: 0.5 mL, Rfl: 1    Past Medical History:   Diagnosis Date    ADHD (attention deficit hyperactivity disorder)     Anxiety     Complete rotator cuff tear or rupture of right shoulder, not specified as traumatic 2018    Depression     General anesthetics causing adverse effect in therapeutic use     delayed recovery       Past Surgical History:   Procedure Laterality Date    ARTHROSCOPY-SHOULDER Right 3/27/2018    [facility]    ARTHROSCOPY-SHOULDER Right 2018    [facility]    ARTHROSCOPY-SHOULDER WITH SUBACROMIAL DECOMPRESSION W/ ALLOGRAFT AUGMENTATION Right 2018    [facility]     SECTION      x2    COLONOSCOPY  2018    Colonoscopy Dr. Wolf Marsh-- Recomended  "colonoscopy in 10 years.     COSMETIC SURGERY      breast lift tummy tuck    HYSTERECTOMY      pelvic pain    REPAIR-ROTATOR CUFF Right 3/27/2018    [facility]    REPAIR-ROTATOR CUFF Right 1/12/2018    [facility]    REPAIR-TENDON-BICEP BICEPS TENOTOMY ARTHROSCOPY Right 1/12/2018    [facility]    SHOULDER ARTHROSCOPY      TONSILLECTOMY      TOTAL REDUCTION MAMMOPLASTY Bilateral 2006       Family History   Problem Relation Age of Onset    Neuropathy Mother     Hypertension Mother     Dementia Mother     Pancreatic cancer Father     Diabetes Father     Thyroid disease Sister     Hernia Sister     Scleroderma Sister     Diabetes Brother     No Known Problems Daughter     Asthma Son     ADD / ADHD Son     No Known Problems Sister        Social History     Tobacco Use    Smoking status: Never Smoker    Smokeless tobacco: Never Used   Substance Use Topics    Alcohol use: Yes     Comment: social    Drug use: No       Social History     Social History Narrative    . 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    Physical Exam   Constitutional: No distress.   HENT:   Head: Normocephalic and atraumatic.   Right Ear: Tympanic membrane and ear canal normal.   Left Ear: Tympanic membrane and ear canal normal.   Nose: Nose normal.   Mouth/Throat: Uvula is midline, oropharynx is clear and moist and mucous membranes are normal.       Neck: Neck supple.   Cardiovascular: Normal rate, regular rhythm, normal heart sounds and intact distal pulses. Exam reveals no gallop and no friction rub.   No murmur " heard.  Pulmonary/Chest: Effort normal and breath sounds normal. She has no decreased breath sounds. She has no wheezes. She has no rhonchi. She has no rales.   Abdominal: Soft. Normal appearance and bowel sounds are normal. There is no hepatomegaly. There is no tenderness.   Musculoskeletal: She exhibits no edema.        Hands:  Neurological: She is alert.         Depression Patient Health Questionnaire 7/16/2019   Over the last two weeks how often have you been bothered by little interest or pleasure in doing things 0   Over the last two weeks how often have you been bothered by feeling down, depressed or hopeless 0   PHQ-2 Total Score 0       ASSESSMENT/PLAN:  Problem List Items Addressed This Visit        Psychiatric    Attention deficit hyperactivity disorder (ADHD), combined type - Primary    Current Assessment & Plan     - well controlled  - continue current meds  - will refill for 3 months         Relevant Medications    lisdexamfetamine (VYVANSE) 40 MG Cap    Anxiety    Current Assessment & Plan     - well controlled  - continue current meds         Relevant Medications    escitalopram oxalate (LEXAPRO) 20 MG tablet       Derm    First degree burn of multiple sites of right hand    Current Assessment & Plan     - healing well  - no signs of infection  - counseling on skin care  -notify me of any worsening         Relevant Medications    silver sulfADIAZINE 1% (SILVADENE) 1 % cream       ID    Herpes labialis without complication    Current Assessment & Plan     - rec Valtrex as needed with any recurrence and instructed on use  - counseling regarding new medications including expected results, potential side effects, and appropriate use. Questions elicited and answered         Relevant Medications    valACYclovir (VALTREX) 1000 MG tablet       GI    Irritable bowel syndrome with diarrhea    Current Assessment & Plan     - dietary counseling               ORDERS:   Orders Placed This Encounter     varicella-zoster gE-AS01B, PF, (SHINGRIX, PF,) 50 mcg/0.5 mL injection    lisdexamfetamine (VYVANSE) 40 MG Cap    escitalopram oxalate (LEXAPRO) 20 MG tablet    silver sulfADIAZINE 1% (SILVADENE) 1 % cream    valACYclovir (VALTREX) 1000 MG tablet     Pt reports lipids done recently after giving blood. Has results and will bring or MyChart a copy of her records    Follow-up in 3 months    Dr. Emeli Blackburn D.O.   [facility]

## 2019-07-16 ENCOUNTER — OFFICE VISIT (OUTPATIENT)
Dept: FAMILY MEDICINE | Facility: CLINIC | Age: 49
End: 2019-07-16
Payer: COMMERCIAL

## 2019-07-16 VITALS
RESPIRATION RATE: 18 BRPM | TEMPERATURE: 98 F | DIASTOLIC BLOOD PRESSURE: 80 MMHG | HEART RATE: 67 BPM | BODY MASS INDEX: 30.64 KG/M2 | OXYGEN SATURATION: 98 % | SYSTOLIC BLOOD PRESSURE: 128 MMHG | HEIGHT: 68 IN | WEIGHT: 202.19 LBS

## 2019-07-16 DIAGNOSIS — F90.2 ATTENTION DEFICIT HYPERACTIVITY DISORDER (ADHD), COMBINED TYPE: Primary | ICD-10-CM

## 2019-07-16 DIAGNOSIS — T23.191A SUPERFICIAL BURN OF MULTIPLE SITES OF RIGHT HAND, INITIAL ENCOUNTER: ICD-10-CM

## 2019-07-16 DIAGNOSIS — B00.1 HERPES LABIALIS WITHOUT COMPLICATION: ICD-10-CM

## 2019-07-16 DIAGNOSIS — F41.9 ANXIETY: ICD-10-CM

## 2019-07-16 DIAGNOSIS — K58.0 IRRITABLE BOWEL SYNDROME WITH DIARRHEA: ICD-10-CM

## 2019-07-16 PROCEDURE — 99214 PR OFFICE/OUTPT VISIT, EST, LEVL IV, 30-39 MIN: ICD-10-PCS | Mod: S$GLB,,, | Performed by: FAMILY MEDICINE

## 2019-07-16 PROCEDURE — 99999 PR PBB SHADOW E&M-EST. PATIENT-LVL IV: ICD-10-PCS | Mod: PBBFAC,,, | Performed by: FAMILY MEDICINE

## 2019-07-16 PROCEDURE — 3008F BODY MASS INDEX DOCD: CPT | Mod: CPTII,S$GLB,, | Performed by: FAMILY MEDICINE

## 2019-07-16 PROCEDURE — 3008F PR BODY MASS INDEX (BMI) DOCUMENTED: ICD-10-PCS | Mod: CPTII,S$GLB,, | Performed by: FAMILY MEDICINE

## 2019-07-16 PROCEDURE — 99214 OFFICE O/P EST MOD 30 MIN: CPT | Mod: S$GLB,,, | Performed by: FAMILY MEDICINE

## 2019-07-16 PROCEDURE — 99999 PR PBB SHADOW E&M-EST. PATIENT-LVL IV: CPT | Mod: PBBFAC,,, | Performed by: FAMILY MEDICINE

## 2019-07-16 RX ORDER — ESCITALOPRAM OXALATE 20 MG/1
20 TABLET ORAL DAILY
COMMUNITY
End: 2019-07-16 | Stop reason: SDUPTHER

## 2019-07-16 RX ORDER — VALACYCLOVIR HYDROCHLORIDE 1 G/1
1000 TABLET, FILM COATED ORAL DAILY PRN
Qty: 30 TABLET | Refills: 0 | Status: SHIPPED | OUTPATIENT
Start: 2019-07-16 | End: 2019-08-12 | Stop reason: SDUPTHER

## 2019-07-16 RX ORDER — LISDEXAMFETAMINE DIMESYLATE 40 MG/1
40 CAPSULE ORAL DAILY
Qty: 30 CAPSULE | Refills: 0 | Status: SHIPPED | OUTPATIENT
Start: 2019-07-16 | End: 2019-08-23 | Stop reason: SDUPTHER

## 2019-07-16 RX ORDER — ESCITALOPRAM OXALATE 20 MG/1
20 TABLET ORAL DAILY
Qty: 90 TABLET | Refills: 1 | Status: SHIPPED | OUTPATIENT
Start: 2019-07-16 | End: 2019-11-07 | Stop reason: SDUPTHER

## 2019-07-16 RX ORDER — SILVER SULFADIAZINE 10 G/1000G
CREAM TOPICAL 2 TIMES DAILY
Qty: 85 G | Refills: 0 | Status: SHIPPED | OUTPATIENT
Start: 2019-07-16 | End: 2020-01-07

## 2019-07-16 NOTE — PATIENT INSTRUCTIONS
1. I recommend the following vaccine: Shingles  - these vaccine have been ordered to your pharmacy  - please ask for them the next time your are there and your pharmacist will administer the vaccine  2. Email me your cholesterol results and if you do not have them, let me know and I can ordered them for your next visit

## 2019-08-12 DIAGNOSIS — B00.1 HERPES LABIALIS WITHOUT COMPLICATION: ICD-10-CM

## 2019-08-12 RX ORDER — VALACYCLOVIR HYDROCHLORIDE 1 G/1
1000 TABLET, FILM COATED ORAL DAILY PRN
Qty: 30 TABLET | Refills: 0 | Status: SHIPPED | OUTPATIENT
Start: 2019-08-12 | End: 2020-01-07

## 2019-08-23 ENCOUNTER — TELEPHONE (OUTPATIENT)
Dept: FAMILY MEDICINE | Facility: CLINIC | Age: 49
End: 2019-08-23

## 2019-08-23 DIAGNOSIS — F90.2 ATTENTION DEFICIT HYPERACTIVITY DISORDER (ADHD), COMBINED TYPE: ICD-10-CM

## 2019-08-23 RX ORDER — LISDEXAMFETAMINE DIMESYLATE 40 MG/1
40 CAPSULE ORAL DAILY
Qty: 30 CAPSULE | Refills: 0 | Status: SHIPPED | OUTPATIENT
Start: 2019-08-23 | End: 2019-10-02 | Stop reason: SDUPTHER

## 2019-08-23 NOTE — TELEPHONE ENCOUNTER
Spoke with pt informed her that her script was sent to her pharmacy and should be ready for  later today. Pt asked if she has to call every month for a refill of her script or can it be given with additional refills? Her last appointment was 7/16/19

## 2019-08-23 NOTE — TELEPHONE ENCOUNTER
----- Message from Brandi Hardy sent at 8/23/2019  4:12 PM CDT -----  Contact: [phone] self   Pt is returning your call. Please advise

## 2019-08-23 NOTE — TELEPHONE ENCOUNTER
Called and spoke with pt. Pharmacy did not notify me of refill request. Encouraged her to MyChart message me if refill needed prior to next appt  Dr. Emeli Blackburn D.O.   [facility]

## 2019-08-23 NOTE — TELEPHONE ENCOUNTER
----- Message from Clinton Christianson, Patient Care Assistant sent at 8/23/2019  3:55 PM CDT -----  Contact: Self   Pt is calling to get a refill of lisdexamfetamine (VYVANSE) 40 MG Cap.  She uses [facility]/pharmacy #[id number] - [place], [place] - [address] [phone]     She would also like to know what is the procedure for refilling this medicine, does she have to call every month?      She can be reached at [phone].    Thank you

## 2019-09-24 ENCOUNTER — PATIENT OUTREACH (OUTPATIENT)
Dept: ADMINISTRATIVE | Facility: HOSPITAL | Age: 49
End: 2019-09-24

## 2019-10-02 DIAGNOSIS — F90.2 ATTENTION DEFICIT HYPERACTIVITY DISORDER (ADHD), COMBINED TYPE: ICD-10-CM

## 2019-10-02 RX ORDER — LISDEXAMFETAMINE DIMESYLATE 40 MG/1
40 CAPSULE ORAL DAILY
Qty: 30 CAPSULE | Refills: 0 | Status: SHIPPED | OUTPATIENT
Start: 2019-10-02 | End: 2019-11-07 | Stop reason: SDUPTHER

## 2019-10-04 PROBLEM — T23.191A: Status: RESOLVED | Noted: 2019-07-16 | Resolved: 2019-10-04

## 2019-10-04 NOTE — PROGRESS NOTES
FAMILY MEDICINE    Patient Active Problem List   Diagnosis    Attention deficit hyperactivity disorder (ADHD), combined type    Anxiety    Herpes labialis without complication    Irritable bowel syndrome with diarrhea       CC:   Chief Complaint   Patient presents with    Follow-up     3 month follow up       SUBJECTIVE:  Hayley Montana   is a 49 y.o. female  - with anxiety, IBS (with cramping and alternative diarrhea. Colonoscopy up to date) and ADHD presents to follow-up ADHD     1. ADHD combined type     Age diagnosed: about 2008  Diagnosed by: Psychologist (but does not have original records)  Initial evaluation present in chart: none  - reviewed Dr. Leann Rogers's noted who continued treatment from Dr. River Mendoza () [facility]  -  rviewed     Past medications: Vyvanse 30 mg daily, Adderall (did not tolerate due to mood issues)   Reasons for changing past medications: not as effective after transition of . 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    HENT: Negative.    Eyes: Negative.    Respiratory: Negative.    Cardiovascular: Negative.    Gastrointestinal: Negative.    Endocrine: Negative.    Genitourinary: Negative.    Musculoskeletal: Negative.    Skin: Negative.    Allergic/Immunologic: Negative.    Neurological: Negative.    Hematological: Negative.    Psychiatric/Behavioral: Negative.        Past Medical History:   Diagnosis Date    ADHD (attention deficit hyperactivity disorder)     Anxiety     Complete rotator cuff tear or rupture of right shoulder, not  specified as traumatic 2018    Depression     General anesthetics causing adverse effect in therapeutic use     delayed recovery       Past Surgical History:   Procedure Laterality Date     SECTION      x2    COLONOSCOPY  2018    Colonoscopy Dr. Wolf Marsh-- Recomended colonoscopy in 10 years.     COSMETIC SURGERY      breast lift tummy tuck    HYSTERECTOMY      pelvic pain    SHOULDER ARTHROSCOPY      TONSILLECTOMY      TOTAL REDUCTION MAMMOPLASTY Bilateral        Family History   Problem Relation Age of Onset    Neuropathy Mother     Hypertension Mother     Dementia Mother     Pancreatic cancer Father     Diabetes Father     Thyroid disease Sister     Hernia Sister     Scleroderma Sister     Diabetes Brother     No Known Problems Daughter     Asthma Son     ADD / ADHD Son     No Known Problems Sister        Social History     Tobacco Use    Smoking status: Never Smoker    Smokeless tobacco: Never Used   Substance Use Topics    Alcohol use: Yes     Comment: social    Drug use: No       Social History     Social History Narrative    . 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 FOR 5 DAYS AS NEEDED FOR COLD SORE., Disp: 30 tablet, Rfl: 0    varicella-zoster gE-AS01B, PF, (SHINGRIX, PF,) 50 mcg/0.5 mL injection, Inject 0.5mL IM at 0 and 2-6 months (Patient not taking: Reported on 10/8/2019), Disp: 0.5 mL,  "Rfl: 1    OBJECTIVE:   Vitals:    10/08/19 0742   BP: 132/80   BP Location: Left arm   Patient Position: Sitting   BP Method: Medium (Manual)   Pulse: 68   Temp: 97.7 °F (36.5 °C)   TempSrc: Oral   SpO2: 98%   Weight: 91.2 kg (201 lb)   Height: 5' 8" (1.727 m)     Body mass index is 30.56 kg/m².    Physical Exam   Constitutional: No distress.   Cardiovascular: Normal rate, regular rhythm, normal heart sounds and intact distal pulses. Exam reveals no gallop and no friction rub.   No murmur heard.  Pulmonary/Chest: Effort normal and breath sounds normal.   Neurological: She is alert.   Psychiatric: Her speech is normal and behavior is normal. Thought content normal.   Mood: good  Affect:full range       ASSESSMENT/PLAN:  Problem List Items Addressed This Visit        Psychiatric    Attention deficit hyperactivity disorder (ADHD), combined type - Primary    Current Assessment & Plan     -  reviewed  - well controlled  - continue current meds  - will refill for 3 months           Other Visit Diagnoses     Encounter for lipid screening for cardiovascular disease        Relevant Orders    Lipid panel    Screening for metabolic disorder        Relevant Orders    Comprehensive metabolic panel          ORDERS:   Orders Placed This Encounter    Influenza - Quadrivalent (PF)    Comprehensive metabolic panel    Lipid panel     Vaccines recommended: flu vaccine    Follow-up in 3 months with labs prior.     Dr. Emeli Blackburn D.O.   Family Medicine    "

## 2019-10-08 ENCOUNTER — OFFICE VISIT (OUTPATIENT)
Dept: FAMILY MEDICINE | Facility: CLINIC | Age: 49
End: 2019-10-08
Payer: COMMERCIAL

## 2019-10-08 VITALS
HEART RATE: 68 BPM | TEMPERATURE: 98 F | DIASTOLIC BLOOD PRESSURE: 80 MMHG | WEIGHT: 201 LBS | HEIGHT: 68 IN | SYSTOLIC BLOOD PRESSURE: 132 MMHG | OXYGEN SATURATION: 98 % | BODY MASS INDEX: 30.46 KG/M2

## 2019-10-08 DIAGNOSIS — Z13.220 ENCOUNTER FOR LIPID SCREENING FOR CARDIOVASCULAR DISEASE: ICD-10-CM

## 2019-10-08 DIAGNOSIS — Z13.228 SCREENING FOR METABOLIC DISORDER: ICD-10-CM

## 2019-10-08 DIAGNOSIS — Z13.6 ENCOUNTER FOR LIPID SCREENING FOR CARDIOVASCULAR DISEASE: ICD-10-CM

## 2019-10-08 DIAGNOSIS — F90.2 ATTENTION DEFICIT HYPERACTIVITY DISORDER (ADHD), COMBINED TYPE: Primary | ICD-10-CM

## 2019-10-08 PROCEDURE — 90471 FLU VACCINE (QUAD) GREATER THAN OR EQUAL TO 3YO PRESERVATIVE FREE IM: ICD-10-PCS | Mod: S$GLB,,, | Performed by: FAMILY MEDICINE

## 2019-10-08 PROCEDURE — 90686 FLU VACCINE (QUAD) GREATER THAN OR EQUAL TO 3YO PRESERVATIVE FREE IM: ICD-10-PCS | Mod: S$GLB,,, | Performed by: FAMILY MEDICINE

## 2019-10-08 PROCEDURE — 99214 OFFICE O/P EST MOD 30 MIN: CPT | Mod: 25,S$GLB,, | Performed by: FAMILY MEDICINE

## 2019-10-08 PROCEDURE — 99999 PR PBB SHADOW E&M-EST. PATIENT-LVL III: ICD-10-PCS | Mod: PBBFAC,,, | Performed by: FAMILY MEDICINE

## 2019-10-08 PROCEDURE — 90686 IIV4 VACC NO PRSV 0.5 ML IM: CPT | Mod: S$GLB,,, | Performed by: FAMILY MEDICINE

## 2019-10-08 PROCEDURE — 90471 IMMUNIZATION ADMIN: CPT | Mod: S$GLB,,, | Performed by: FAMILY MEDICINE

## 2019-10-08 PROCEDURE — 3008F BODY MASS INDEX DOCD: CPT | Mod: CPTII,S$GLB,, | Performed by: FAMILY MEDICINE

## 2019-10-08 PROCEDURE — 99999 PR PBB SHADOW E&M-EST. PATIENT-LVL III: CPT | Mod: PBBFAC,,, | Performed by: FAMILY MEDICINE

## 2019-10-08 PROCEDURE — 99214 PR OFFICE/OUTPT VISIT, EST, LEVL IV, 30-39 MIN: ICD-10-PCS | Mod: 25,S$GLB,, | Performed by: FAMILY MEDICINE

## 2019-10-08 PROCEDURE — 3008F PR BODY MASS INDEX (BMI) DOCUMENTED: ICD-10-PCS | Mod: CPTII,S$GLB,, | Performed by: FAMILY MEDICINE

## 2019-10-08 RX ORDER — LISDEXAMFETAMINE DIMESYLATE 40 MG/1
40 CAPSULE ORAL DAILY
Qty: 30 CAPSULE | Refills: 0 | Status: CANCELLED | OUTPATIENT
Start: 2019-10-08

## 2019-10-21 LAB — HUMAN PAPILLOMAVIRUS (HPV): NORMAL

## 2019-11-07 DIAGNOSIS — F41.9 ANXIETY: ICD-10-CM

## 2019-11-07 DIAGNOSIS — F90.2 ATTENTION DEFICIT HYPERACTIVITY DISORDER (ADHD), COMBINED TYPE: ICD-10-CM

## 2019-11-07 RX ORDER — ESCITALOPRAM OXALATE 20 MG/1
20 TABLET ORAL DAILY
Qty: 90 TABLET | Refills: 1 | Status: SHIPPED | OUTPATIENT
Start: 2019-11-07 | End: 2020-03-18

## 2019-11-07 RX ORDER — LISDEXAMFETAMINE DIMESYLATE 40 MG/1
40 CAPSULE ORAL DAILY
Qty: 30 CAPSULE | Refills: 0 | Status: SHIPPED | OUTPATIENT
Start: 2019-11-07 | End: 2019-12-09 | Stop reason: SDUPTHER

## 2019-12-09 DIAGNOSIS — F90.2 ATTENTION DEFICIT HYPERACTIVITY DISORDER (ADHD), COMBINED TYPE: ICD-10-CM

## 2019-12-09 RX ORDER — LISDEXAMFETAMINE DIMESYLATE 40 MG/1
40 CAPSULE ORAL DAILY
Qty: 30 CAPSULE | Refills: 0 | Status: SHIPPED | OUTPATIENT
Start: 2019-12-09 | End: 2020-01-06 | Stop reason: SDUPTHER

## 2019-12-24 ENCOUNTER — PATIENT OUTREACH (OUTPATIENT)
Dept: ADMINISTRATIVE | Facility: HOSPITAL | Age: 49
End: 2019-12-24

## 2019-12-24 ENCOUNTER — OFFICE VISIT (OUTPATIENT)
Dept: FAMILY MEDICINE | Facility: CLINIC | Age: 49
End: 2019-12-24
Payer: COMMERCIAL

## 2019-12-24 VITALS
DIASTOLIC BLOOD PRESSURE: 90 MMHG | OXYGEN SATURATION: 97 % | SYSTOLIC BLOOD PRESSURE: 130 MMHG | HEIGHT: 68 IN | WEIGHT: 204.63 LBS | TEMPERATURE: 98 F | HEART RATE: 67 BPM | BODY MASS INDEX: 31.01 KG/M2

## 2019-12-24 DIAGNOSIS — J02.9 SORE THROAT: Primary | ICD-10-CM

## 2019-12-24 DIAGNOSIS — J20.9 ACUTE BRONCHITIS, UNSPECIFIED ORGANISM: ICD-10-CM

## 2019-12-24 DIAGNOSIS — R49.0 HOARSENESS: ICD-10-CM

## 2019-12-24 PROCEDURE — 96372 THER/PROPH/DIAG INJ SC/IM: CPT | Mod: S$GLB,,, | Performed by: INTERNAL MEDICINE

## 2019-12-24 PROCEDURE — 99213 OFFICE O/P EST LOW 20 MIN: CPT | Mod: 25,S$GLB,, | Performed by: INTERNAL MEDICINE

## 2019-12-24 PROCEDURE — 3008F PR BODY MASS INDEX (BMI) DOCUMENTED: ICD-10-PCS | Mod: CPTII,S$GLB,, | Performed by: INTERNAL MEDICINE

## 2019-12-24 PROCEDURE — 99999 PR PBB SHADOW E&M-EST. PATIENT-LVL IV: ICD-10-PCS | Mod: PBBFAC,,, | Performed by: INTERNAL MEDICINE

## 2019-12-24 PROCEDURE — 99213 PR OFFICE/OUTPT VISIT, EST, LEVL III, 20-29 MIN: ICD-10-PCS | Mod: 25,S$GLB,, | Performed by: INTERNAL MEDICINE

## 2019-12-24 PROCEDURE — 96372 PR INJECTION,THERAP/PROPH/DIAG2ST, IM OR SUBCUT: ICD-10-PCS | Mod: S$GLB,,, | Performed by: INTERNAL MEDICINE

## 2019-12-24 PROCEDURE — 99999 PR PBB SHADOW E&M-EST. PATIENT-LVL IV: CPT | Mod: PBBFAC,,, | Performed by: INTERNAL MEDICINE

## 2019-12-24 PROCEDURE — 3008F BODY MASS INDEX DOCD: CPT | Mod: CPTII,S$GLB,, | Performed by: INTERNAL MEDICINE

## 2019-12-24 RX ORDER — TRIAMCINOLONE ACETONIDE 40 MG/ML
40 INJECTION, SUSPENSION INTRA-ARTICULAR; INTRAMUSCULAR
Status: COMPLETED | OUTPATIENT
Start: 2019-12-24 | End: 2019-12-24

## 2019-12-24 RX ORDER — AMOXICILLIN AND CLAVULANATE POTASSIUM 875; 125 MG/1; MG/1
1 TABLET, FILM COATED ORAL 2 TIMES DAILY
Qty: 20 TABLET | Refills: 0 | Status: SHIPPED | OUTPATIENT
Start: 2019-12-24 | End: 2020-01-03

## 2019-12-24 RX ADMIN — TRIAMCINOLONE ACETONIDE 40 MG: 40 INJECTION, SUSPENSION INTRA-ARTICULAR; INTRAMUSCULAR at 11:12

## 2019-12-24 NOTE — PATIENT INSTRUCTIONS
We have reviewed your prescription medications.   Strep test was negative. Clinically, it looks like strep, so I am treating you with augmentin.   We will also treat with a steroid shot . You have been given a steroid shot to help manage your symptoms. Possible side effects of this medication are sleeplessness, irritability, and increased hunger.     Fern Segovia and good luck to your son!

## 2019-12-30 NOTE — PROGRESS NOTES
Subjective:       Patient ID: Hayley Montana is a 49 y.o. female.    Chief Complaint: Hoarse and Sore Throat     I have reviewed the PMH,  and  for this patient    She  has a past medical history of ADHD (attention deficit hyperactivity disorder), Anxiety, Complete rotator cuff tear or rupture of right shoulder, not specified as traumatic (1/12/2018), Depression, and General anesthetics causing adverse effect in therapeutic use.    She is a patient of Dr Blackburn who presents today for an urgent appointment for sore throat and not feeling well. She has been sick for about 2 days. She has had no known exposure to strep throat. She has had a mild cough and a sinus drip in addition to sore throat and hoarseness. Her drainage is clear. She has not had a fever, but she has had sweats.     Active Ambulatory Problems     Diagnosis Date Noted    Attention deficit hyperactivity disorder (ADHD), combined type 07/12/2019    Anxiety 07/12/2019    Herpes labialis without complication 07/16/2019    Irritable bowel syndrome with diarrhea 07/16/2019     Resolved Ambulatory Problems     Diagnosis Date Noted    Complete rotator cuff tear or rupture of right shoulder, not specified as traumatic 01/12/2018    Complete tear of right rotator cuff 03/27/2018    External hemorrhoids 02/15/2019    Rectal hemorrhage 02/15/2019    First degree burn of multiple sites of right hand 07/16/2019     Past Medical History:   Diagnosis Date    ADHD (attention deficit hyperactivity disorder)     Depression     General anesthetics causing adverse effect in therapeutic use          MEDICATIONS:  Current Outpatient Medications:     escitalopram oxalate (LEXAPRO) 20 MG tablet, Take 1 tablet (20 mg total) by mouth once daily., Disp: 90 tablet, Rfl: 1    lisdexamfetamine (VYVANSE) 40 MG Cap, Take 1 capsule (40 mg total) by mouth once daily., Disp: 30 capsule, Rfl: 0    amoxicillin-clavulanate 875-125mg (AUGMENTIN) 875-125 mg per tablet, Take 1  tablet by mouth 2 (two) times daily. for 10 days, Disp: 20 tablet, Rfl: 0    silver sulfADIAZINE 1% (SILVADENE) 1 % cream, Apply topically 2 (two) times daily. (Patient not taking: Reported on 12/24/2019), Disp: 85 g, Rfl: 0    valACYclovir (VALTREX) 1000 MG tablet, TAKE 1 TABLET (1,000 MG TOTAL) BY MOUTH DAILY AS NEEDED. FOR 5 DAYS AS NEEDED FOR COLD SORE. (Patient not taking: Reported on 12/24/2019), Disp: 30 tablet, Rfl: 0    varicella-zoster gE-AS01B, PF, (SHINGRIX, PF,) 50 mcg/0.5 mL injection, Inject 0.5mL IM at 0 and 2-6 months (Patient not taking: Reported on 12/24/2019), Disp: 0.5 mL, Rfl: 1      HEALTH MAINTENANCE:   Health Maintenance   Topic Date Due    Mammogram  01/22/2020    Pap Smear  07/12/2021    Lipid Panel  12/24/2024    Colonoscopy  08/06/2028    TETANUS VACCINE  02/19/2029       Review of Systems   Constitutional: Negative for activity change, appetite change, chills, diaphoresis, fatigue and fever.   HENT: Positive for congestion, rhinorrhea and sore throat. Negative for drooling, ear discharge, ear pain, nosebleeds, postnasal drip, sinus pressure, sinus pain, sneezing, trouble swallowing and voice change.    Eyes: Negative for pain, discharge, redness and itching.   Respiratory: Positive for cough. Negative for chest tightness, shortness of breath and wheezing.    Cardiovascular: Negative for chest pain and leg swelling.   Gastrointestinal: Negative for abdominal pain, constipation, diarrhea and nausea.   Musculoskeletal: Negative for arthralgias, joint swelling and myalgias.   Skin: Negative for pallor and rash.   Neurological: Negative for dizziness, weakness, light-headedness and headaches.   Hematological: Negative for adenopathy. Does not bruise/bleed easily.   Psychiatric/Behavioral: Negative for agitation and sleep disturbance.       Objective:          A1C:      CBC:      CMP:  Recent Labs   Lab 12/24/19  1130   Glucose 114 H   Calcium 9.5   Albumin 4.6   Total Protein 8.0    Sodium 144   Potassium 4.3   CO2 31 H   Chloride 102   BUN, Bld 12   Creatinine 0.52   Alkaline Phosphatase 96   ALT 30   AST 31   Total Bilirubin 0.6     LIPIDS:  Recent Labs   Lab 12/24/19  1130   HDL 74   Cholesterol 187   Triglycerides 98   LDL Cholesterol 93.4   Hdl/Cholesterol Ratio 39.6   Non-HDL Cholesterol 113   Total Cholesterol/HDL Ratio 2.5     TSH:            Physical Exam   Constitutional: She is oriented to person, place, and time.   HENT:   Head: Normocephalic and atraumatic.   Right Ear: External ear normal. Tympanic membrane is not injected, not erythematous and not retracted. No middle ear effusion.   Left Ear: External ear normal. Tympanic membrane is not injected, not erythematous and not retracted.  No middle ear effusion.   Nose: Nose normal. No rhinorrhea. Right sinus exhibits no maxillary sinus tenderness and no frontal sinus tenderness. Left sinus exhibits no maxillary sinus tenderness and no frontal sinus tenderness.   Mouth/Throat: Posterior oropharyngeal erythema present. No oropharyngeal exudate or posterior oropharyngeal edema. Tonsils are 2+ on the right. Tonsils are 2+ on the left.       Eyes: Pupils are equal, round, and reactive to light. Conjunctivae and EOM are normal. Right eye exhibits no discharge. Left eye exhibits no discharge.   Neck: Normal range of motion. Neck supple. No thyromegaly present.   Cardiovascular: Normal rate, regular rhythm, normal heart sounds and intact distal pulses.   No murmur heard.  Pulmonary/Chest: Effort normal and breath sounds normal. No accessory muscle usage. No tachypnea. No respiratory distress. She has no wheezes. She has no rhonchi. She has no rales.   Abdominal: Soft. She exhibits no distension.   Musculoskeletal: She exhibits no edema or tenderness.   Lymphadenopathy:     She has cervical adenopathy.   Neurological: She is alert and oriented to person, place, and time.   Skin: Skin is warm and dry. No rash noted. No erythema.    Psychiatric: She has a normal mood and affect. Her behavior is normal.             Assessment and Plan:     Problem List Items Addressed This Visit     None      Visit Diagnoses     Sore throat    -  Primary - her rapid strep test was negative, but she clinically has strep throat. We will treat with augmentin.     Relevant Orders    POCT Rapid Strep A    Acute bronchitis, unspecified organism     - steroid shot may help.       Hoarseness     - steroid shot should help.           Orders Placed This Encounter    POCT Rapid Strep A    amoxicillin-clavulanate 875-125mg (AUGMENTIN) 875-125 mg per tablet    triamcinolone acetonide injection 40 mg         Follow-up with Dr Blackburn as recommended.       Lluvia Starkey MD,  FACP  Internal Medicine

## 2020-01-06 NOTE — PROGRESS NOTES
FAMILY MEDICINE    Patient Active Problem List   Diagnosis    Attention deficit hyperactivity disorder (ADHD), combined type    Anxiety    History of herpes labialis    Irritable bowel syndrome with diarrhea    Low grade squamous intraepithelial lesion (LGSIL) on cervical Pap smear    Memory changes    Impaired fasting glucose    Skin lesion of right leg       CC:   Chief Complaint   Patient presents with    Follow-up       SUBJECTIVE:  Hayley Montana   is a 49 y.o. female  - with anxiety, IBS (with cramping and alternative diarrhea. Colonoscopy up to date) and ADHD presents to follow-up ADHD and reports that recently stopped her Lexapro about 7 days ago    She continues to worry about shortterm memory issues. She would like evaluation by Neurology    She also is concerned for several skin lesion on her right leg that change in size and are painful at times. She would like evaluation by Dermatology    She is trying to reduce her medication usage and wants to focus on more lifestyle measures to manage her anxiety and ADHD though also nervous and would like to discuss other options.     Mammogram done 11/2019 Dr. Lance at [facility]  IRIS: signed and copy requested    1. ADHD combined type     Age diagnosed: about 2008  Diagnosed by: Psychologist (but does not have original records)  Initial evaluation present in chart: none  - reviewed Dr. Leann Rogers's noted who continued treatment from Dr. River Mendoza () [facility]  -  rviewed     Past medications: Vyvanse 30 mg daily, Adderall (did not tolerate due to mood issues)   Reasons for changing past medications: not as effective after transition of . 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 Anxiety  Age diagnosed: 40's  - initially with depression but reports depression resolved around 41 yo and since them medication for anxiety only   - she reports that a friend is on Buspar and reports that she can take as needed and not daily and she is interested in considering this medication     Prior treatments: Paxil  Side effects of prior treatment: not effective     Current treatment:   Lexapro 20 mg daily   - stopped about 7 days ago     Side effects of current treatment: denies     Symptoms related: well controlled  Panic attacks: denies     Hopelessness: denies  Sleep: denies  Suicidal thoughts: denies  Thoughts of self harm:denies  Thoughts of harm to others: denies  History of suicide attempts: denies  Family history of suicide:denies     Support system: friends   Counselor: none      ROS: Review of Systems   Constitutional: Negative.    HENT: Negative.    Eyes: Negative.    Respiratory: Negative.    Cardiovascular: Negative.    Gastrointestinal: Negative.    Endocrine: Negative.    Genitourinary: Negative.    Musculoskeletal: Negative.    Skin: Negative.    Allergic/Immunologic: Negative.    Neurological: Negative.    Hematological: Negative.    Psychiatric/Behavioral: Negative.        Past Medical History:   Diagnosis Date    ADHD (attention deficit hyperactivity disorder)     Anxiety     Complete rotator cuff tear or rupture of right shoulder, not specified as traumatic 2018    Depression     General anesthetics causing adverse effect in therapeutic use     delayed recovery       Past Surgical History:   Procedure Laterality Date     SECTION      x2    COLONOSCOPY  2018    Colonoscopy Dr. Wolf Marsh-- Recomended colonoscopy in 10 years.     COSMETIC SURGERY      breast lift tummy tuck    HYSTERECTOMY      pelvic pain    SHOULDER ARTHROSCOPY      TONSILLECTOMY      TOTAL  "REDUCTION MAMMOPLASTY Bilateral 2006       Family History   Problem Relation Age of Onset    Neuropathy Mother     Hypertension Mother     Dementia Mother     Pancreatic cancer Father     Diabetes Father     Thyroid disease Sister     Hernia Sister     Scleroderma Sister     Diabetes Brother     No Known Problems Daughter     Asthma Son     ADD / ADHD Son     No Known Problems Sister        Social History     Tobacco Use    Smoking status: Never Smoker    Smokeless tobacco: Never Used   Substance Use Topics    Alcohol use: Yes     Comment: social    Drug use: No       Social History     Social History Narrative    . 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 (Patient not taking: Reported on 1/7/2020), Disp: 90 tablet, Rfl: 1    lisdexamfetamine (VYVANSE) 40 MG Cap, Take 1 capsule (40 mg total) by mouth once daily., Disp: 30 capsule, Rfl: 0    varicella-zoster gE-AS01B, PF, (SHINGRIX, PF,) 50 mcg/0.5 mL injection, Inject 0.5mL IM at 0 and 2-6 months (Patient not taking: Reported on 12/24/2019), Disp: 0.5 mL, Rfl: 1    OBJECTIVE:   Vitals:    01/07/20 0737   BP: 130/76   BP Location: Left arm   Patient Position: Sitting   BP Method: Medium (Manual)   Pulse: (!) 55   Temp: 98 °F (36.7 °C)   TempSrc: Oral   SpO2: 99%   Weight: 94 kg (207 lb 4.8 oz)   Height: 5' 8" (1.727 m)     Body mass index is 31.52 kg/m².    Physical Exam   Constitutional: No distress.   Cardiovascular: Normal rate, regular rhythm, normal heart sounds and intact distal pulses. Exam reveals no gallop and no friction rub.   No murmur heard.  Pulmonary/Chest: Effort normal and breath sounds normal. "   Neurological: She is alert.   Psychiatric: Her speech is normal and behavior is normal. Thought content normal.   Mood: good  Affect:full range and tearful at time       Depression Patient Health Questionnaire 1/7/2020 12/24/2019 10/8/2019 7/16/2019   Over the last two weeks how often have you been bothered by little interest or pleasure in doing things 0 0 0 0   Over the last two weeks how often have you been bothered by feeling down, depressed or hopeless 0 0 0 0   PHQ-2 Total Score 0 0 0 0     LABS:   Lab Visit on 12/24/2019   Component Date Value Ref Range Status    Sodium 12/24/2019 144  136 - 145 mmol/L Final    Potassium 12/24/2019 4.3  3.5 - 5.1 mmol/L Final    Chloride 12/24/2019 102  95 - 110 mmol/L Final    CO2 12/24/2019 31* 23 - 29 mmol/L Final    Glucose 12/24/2019 114* 70 - 110 mg/dL Final    BUN, Bld 12/24/2019 12  7 - 17 mg/dL Final    Creatinine 12/24/2019 0.52  0.50 - 1.40 mg/dL Final    Calcium 12/24/2019 9.5  8.7 - 10.5 mg/dL Final    Total Protein 12/24/2019 8.0  6.0 - 8.4 g/dL Final    Albumin 12/24/2019 4.6  3.5 - 5.2 g/dL Final    Total Bilirubin 12/24/2019 0.6  0.1 - 1.0 mg/dL Final    Comment: For infants and newborns, interpretation of results should be based  on gestational age, weight and in agreement with clinical  observations.  Premature Infant recommended reference ranges:  Up to 24 hours.............<8.0 mg/dL  Up to 48 hours............<12.0 mg/dL  3-5 days..................<15.0 mg/dL  6-29 days.................<15.0 mg/dL      Alkaline Phosphatase 12/24/2019 96  38 - 126 U/L Final    AST 12/24/2019 31  15 - 46 U/L Final    ALT 12/24/2019 30  10 - 44 U/L Final    Anion Gap 12/24/2019 11  8 - 16 mmol/L Final    eGFR if African American 12/24/2019 >60.0  >60 mL/min/1.73 m^2 Final    eGFR if non African American 12/24/2019 >60.0  >60 mL/min/1.73 m^2 Final    Comment: Calculation used to obtain the estimated glomerular filtration  rate (eGFR) is the CKD-EPI  equation.       Cholesterol 12/24/2019 187  120 - 199 mg/dL Final    Comment: The National Cholesterol Education Program (NCEP) has set the  following guidelines (reference ranges) for Cholesterol:  Optimal.....................<200 mg/dL  Borderline High.............200-239 mg/dL  High........................> or = 240 mg/dL      Triglycerides 12/24/2019 98  30 - 150 mg/dL Final    Comment: The National Cholesterol Education Program (NCEP) has set the  following guidelines (reference values) for triglycerides:  Normal......................<150 mg/dL  Borderline High.............150-199 mg/dL  High........................200-499 mg/dL      HDL 12/24/2019 74  40 - 75 mg/dL Final    Comment: The National Cholesterol Education Program (NCEP) has set the  following guidelines (reference values) for HDL Cholesterol:  Low...............<40 mg/dL  Optimal...........>60 mg/dL      LDL Cholesterol 12/24/2019 93.4  63.0 - 159.0 mg/dL Final    Comment: The National Cholesterol Education Program (NCEP) has set the  following guidelines (reference values) for LDL Cholesterol:  Optimal.......................<130 mg/dL  Borderline High...............130-159 mg/dL  High..........................160-189 mg/dL  Very High.....................>190 mg/dL      Hdl/Cholesterol Ratio 12/24/2019 39.6  20.0 - 50.0 % Final    Total Cholesterol/HDL Ratio 12/24/2019 2.5  2.0 - 5.0 Final    Non-HDL Cholesterol 12/24/2019 113  mg/dL Final    Comment: Risk category and Non-HDL cholesterol goals:  Coronary heart disease (CHD)or equivalent (10-year risk of CHD >20%):  Non-HDL cholesterol goal     <130 mg/dL  Two or more CHD risk factors and 10-year risk of CHD <= 20%:  Non-HDL cholesterol goal     <160 mg/dL  0 to 1 CHD risk factor:  Non-HDL cholesterol goal     <190 mg/dL           ASSESSMENT/PLAN:  Problem List Items Addressed This Visit        Neuro    Memory changes    Current Assessment & Plan     - referral to Neurology         Relevant  Orders    Ambulatory referral to Neurology       Psychiatric    Attention deficit hyperactivity disorder (ADHD), combined type - Primary    Current Assessment & Plan     - well controlled  - continue current medications  -  reviewed  - okay to decrease usage         Relevant Medications    lisdexamfetamine (VYVANSE) 40 MG Cap    Anxiety    Current Assessment & Plan     - okay to remain off of Lexapro at this time  - trial Buspar 5 mg BID PRN and discussed that if symptoms worsen, may want to consider restarting Lexapro  - counseling on diet and exercise effects on mood  - recommend mindfulness exercises daily starting at 5 mins with goal 20 mins daily  - monitor closely         Relevant Medications    busPIRone (BUSPAR) 5 MG Tab       Derm    Skin lesion of right leg    Current Assessment & Plan     - referral to Dermatology for evaluation         Relevant Orders    Ambulatory referral to Dermatology       Renal/    Low grade squamous intraepithelial lesion (LGSIL) on cervical Pap smear    Overview     - 10/2019 PAP by Dr. Lance  - Dr. Lance following            Endocrine    Impaired fasting glucose    Current Assessment & Plan     - glucose 114 mg/dL  - counseling on glucose control, prediabetes and diabetes  - pt plans on starting exercise program  - discussed recommendation for diet, exercise and weight loss  - repeat with A1C         Relevant Orders    Hemoglobin A1c    Glucose, fasting      Other Visit Diagnoses     Skin cancer screening        Relevant Orders    Ambulatory referral to Dermatology          ORDERS:   Orders Placed This Encounter    Hemoglobin A1c    Glucose, fasting    Ambulatory referral to Dermatology    Ambulatory referral to Neurology    lisdexamfetamine (VYVANSE) 40 MG Cap    busPIRone (BUSPAR) 5 MG Tab     Vaccines recommended: flu vaccine    Follow-up in 3 months with labs prior.     Dr. Emeli Blackburn D.O.   Family Medicine

## 2020-01-07 ENCOUNTER — OFFICE VISIT (OUTPATIENT)
Dept: FAMILY MEDICINE | Facility: CLINIC | Age: 50
End: 2020-01-07
Payer: COMMERCIAL

## 2020-01-07 VITALS
DIASTOLIC BLOOD PRESSURE: 76 MMHG | BODY MASS INDEX: 31.42 KG/M2 | OXYGEN SATURATION: 99 % | HEIGHT: 68 IN | WEIGHT: 207.31 LBS | SYSTOLIC BLOOD PRESSURE: 130 MMHG | HEART RATE: 55 BPM | TEMPERATURE: 98 F

## 2020-01-07 DIAGNOSIS — Z12.83 SKIN CANCER SCREENING: ICD-10-CM

## 2020-01-07 DIAGNOSIS — R41.3 MEMORY CHANGES: ICD-10-CM

## 2020-01-07 DIAGNOSIS — R73.01 IMPAIRED FASTING GLUCOSE: ICD-10-CM

## 2020-01-07 DIAGNOSIS — L98.9 SKIN LESION OF RIGHT LEG: ICD-10-CM

## 2020-01-07 DIAGNOSIS — F90.2 ATTENTION DEFICIT HYPERACTIVITY DISORDER (ADHD), COMBINED TYPE: Primary | ICD-10-CM

## 2020-01-07 DIAGNOSIS — R87.612 LOW GRADE SQUAMOUS INTRAEPITHELIAL LESION (LGSIL) ON CERVICAL PAP SMEAR: ICD-10-CM

## 2020-01-07 DIAGNOSIS — F41.9 ANXIETY: ICD-10-CM

## 2020-01-07 PROBLEM — Z86.19 HISTORY OF HERPES LABIALIS: Status: ACTIVE | Noted: 2019-07-16

## 2020-01-07 PROCEDURE — 3008F PR BODY MASS INDEX (BMI) DOCUMENTED: ICD-10-PCS | Mod: CPTII,S$GLB,, | Performed by: FAMILY MEDICINE

## 2020-01-07 PROCEDURE — 99214 OFFICE O/P EST MOD 30 MIN: CPT | Mod: S$GLB,,, | Performed by: FAMILY MEDICINE

## 2020-01-07 PROCEDURE — 99999 PR PBB SHADOW E&M-EST. PATIENT-LVL IV: CPT | Mod: PBBFAC,,, | Performed by: FAMILY MEDICINE

## 2020-01-07 PROCEDURE — 99214 PR OFFICE/OUTPT VISIT, EST, LEVL IV, 30-39 MIN: ICD-10-PCS | Mod: S$GLB,,, | Performed by: FAMILY MEDICINE

## 2020-01-07 PROCEDURE — 99999 PR PBB SHADOW E&M-EST. PATIENT-LVL IV: ICD-10-PCS | Mod: PBBFAC,,, | Performed by: FAMILY MEDICINE

## 2020-01-07 PROCEDURE — 3008F BODY MASS INDEX DOCD: CPT | Mod: CPTII,S$GLB,, | Performed by: FAMILY MEDICINE

## 2020-01-07 RX ORDER — LISDEXAMFETAMINE DIMESYLATE 40 MG/1
40 CAPSULE ORAL DAILY
Qty: 30 CAPSULE | Refills: 0 | Status: SHIPPED | OUTPATIENT
Start: 2020-01-07 | End: 2020-05-06 | Stop reason: SDUPTHER

## 2020-01-07 RX ORDER — BUSPIRONE HYDROCHLORIDE 5 MG/1
5 TABLET ORAL 2 TIMES DAILY
Qty: 60 TABLET | Refills: 0 | Status: SHIPPED | OUTPATIENT
Start: 2020-01-07 | End: 2020-01-29

## 2020-01-07 NOTE — ASSESSMENT & PLAN NOTE
- glucose 114 mg/dL  - counseling on glucose control, prediabetes and diabetes  - pt plans on starting exercise program  - discussed recommendation for diet, exercise and weight loss  - repeat with A1C

## 2020-01-08 ENCOUNTER — PATIENT OUTREACH (OUTPATIENT)
Dept: ADMINISTRATIVE | Facility: HOSPITAL | Age: 50
End: 2020-01-08

## 2020-01-08 NOTE — LETTER
AUTHORIZATION FOR RELEASE OF   CONFIDENTIAL INFORMATION    Dear Dr. Lance,    We are seeing Hayley Montana, date of birth 1970, in the clinic at [facility]. Emeli Blackubrn DO is the patient's PCP. Hayley Montana has an outstanding lab/procedure at the time we reviewed her chart. In order to help keep her health information updated, she has authorized us to request the following medical record(s):        ( X )  MAMMOGRAM                                      (  )  COLONOSCOPY      ( X )  PAP SMEAR                                          (  )  OUTSIDE LAB RESULTS     (  )  DEXA SCAN                                          (  )  EYE EXAM            (  )  FOOT EXAM                                          (  )  ENTIRE RECORD     (  )  OUTSIDE IMMUNIZATIONS                 (  )  _______________         Please fax records to us at [phone].     Attention: Korina Jimenez          Patient Name: Hayley Montana  : 1970  Patient Phone #: [phone]

## 2020-01-21 ENCOUNTER — PATIENT OUTREACH (OUTPATIENT)
Dept: ADMINISTRATIVE | Facility: HOSPITAL | Age: 50
End: 2020-01-21

## 2020-01-24 ENCOUNTER — PATIENT MESSAGE (OUTPATIENT)
Dept: NEUROLOGY | Facility: CLINIC | Age: 50
End: 2020-01-24

## 2020-01-29 DIAGNOSIS — F41.9 ANXIETY: ICD-10-CM

## 2020-01-29 RX ORDER — BUSPIRONE HYDROCHLORIDE 5 MG/1
TABLET ORAL
Qty: 60 TABLET | Refills: 3 | Status: SHIPPED | OUTPATIENT
Start: 2020-01-29 | End: 2020-03-18

## 2020-03-03 ENCOUNTER — PATIENT MESSAGE (OUTPATIENT)
Dept: FAMILY MEDICINE | Facility: CLINIC | Age: 50
End: 2020-03-03

## 2020-03-04 ENCOUNTER — PATIENT MESSAGE (OUTPATIENT)
Dept: FAMILY MEDICINE | Facility: CLINIC | Age: 50
End: 2020-03-04

## 2020-03-18 ENCOUNTER — PATIENT MESSAGE (OUTPATIENT)
Dept: FAMILY MEDICINE | Facility: CLINIC | Age: 50
End: 2020-03-18

## 2020-03-18 DIAGNOSIS — F41.9 ANXIETY: Primary | ICD-10-CM

## 2020-03-18 RX ORDER — FLUOXETINE 10 MG/1
TABLET ORAL
Qty: 53 TABLET | Refills: 0 | Status: SHIPPED | OUTPATIENT
Start: 2020-03-18 | End: 2020-04-08

## 2020-04-07 DIAGNOSIS — F41.9 ANXIETY: ICD-10-CM

## 2020-04-08 ENCOUNTER — PATIENT MESSAGE (OUTPATIENT)
Dept: FAMILY MEDICINE | Facility: CLINIC | Age: 50
End: 2020-04-08

## 2020-04-08 RX ORDER — FLUOXETINE 10 MG/1
10 TABLET ORAL DAILY
Qty: 90 TABLET | Refills: 0 | Status: SHIPPED | OUTPATIENT
Start: 2020-04-08 | End: 2020-06-23 | Stop reason: SDUPTHER

## 2020-05-03 ENCOUNTER — PATIENT MESSAGE (OUTPATIENT)
Dept: FAMILY MEDICINE | Facility: CLINIC | Age: 50
End: 2020-05-03

## 2020-05-03 DIAGNOSIS — F41.9 ANXIETY: ICD-10-CM

## 2020-05-04 ENCOUNTER — PATIENT MESSAGE (OUTPATIENT)
Dept: FAMILY MEDICINE | Facility: CLINIC | Age: 50
End: 2020-05-04

## 2020-05-04 RX ORDER — BUSPIRONE HYDROCHLORIDE 5 MG/1
TABLET ORAL
Qty: 180 TABLET | Refills: 1 | OUTPATIENT
Start: 2020-05-04

## 2020-05-06 PROBLEM — L98.9 SKIN LESION OF RIGHT LEG: Status: RESOLVED | Noted: 2020-01-07 | Resolved: 2020-05-06

## 2020-05-06 NOTE — PROGRESS NOTES
FAMILY MEDICINE    Patient Active Problem List   Diagnosis    Attention deficit hyperactivity disorder (ADHD), combined type    Anxiety    History of herpes labialis    Irritable bowel syndrome with diarrhea    Low grade squamous intraepithelial lesion (LGSIL) on cervical Pap smear    Impaired fasting glucose       CC:   Chief Complaint   Patient presents with    Follow-up    ADHD    Anxiety       SUBJECTIVE:  Hayley Montana   is a 50 y.o. female  - with anxiety, IBS and ADHD presents to follow-up ADHD and anxiety    Gynecology Dr. Lance (mammogram [facility])    1. ADHD combined type  - was trying to get off the medication and last filled 1/7/2020 however since losing her job and looking for new employment she has notice increased difficulty completing tasks and applying for jobs off of medication and would like to restart Vyvanse 40 mg daily     Age diagnosed: about 2008  Diagnosed by: Psychologist (but does not have original records)  Initial evaluation present in chart: none  - reviewed Dr. Leann Rogers's noted who continued treatment from Dr. River Mendoza () [facility]     Past medications: Vyvanse 30 mg daily, Adderall (did not tolerate due to mood issues)   Reasons for changing past medications: not as effective after transition of . 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 Household chores and looking for new employment    2. Anxiety    Age diagnosed: 40's  - initially with depression but reports depression resolved around 43 yo and since them medication for anxiety only      Prior treatments:  Paxil, Lexapro, Buspar  Side effects of prior treatment: not effective     Current treatment:   FLUoxetine 10 MG Tab, Take 1 tablet (10 mg total) by mouth once daily., Disp: 90 tablet, Rfl: 0    Side effects of current treatment: denies     Symptoms related: reports improved since restart SSRI and weaning off of Buspar  - decreased memory issues which she felt she had with Lexapro  - reports that taking 1/2 to 1 pill daily and symptoms well controlled though sleep has been more difficult since she lost her job  Panic attacks: denies     Hopelessness: denies  Sleep: difficult falling asleep  Suicidal thoughts: denies  Thoughts of self harm:denies  Thoughts of harm to others: denies  History of suicide attempts: denies  Family history of suicide:denies     Support system: friends   Counselor: none      ROS: Review of Systems   Constitutional: Negative.    HENT: Negative.    Eyes: Negative.    Respiratory: Negative.    Cardiovascular: Negative.    Gastrointestinal: Negative.    Endocrine: Negative.    Genitourinary: Negative.    Musculoskeletal: Negative.    Skin: Negative.    Allergic/Immunologic: Negative.    Neurological: Negative.    Hematological: Negative.    Psychiatric/Behavioral: Positive for decreased concentration and sleep disturbance.        Otherwise negative         Past Medical History:   Diagnosis Date    ADHD (attention deficit hyperactivity disorder)     Anxiety     Complete rotator cuff tear or rupture of right shoulder, not specified as traumatic 2018    Depression     General anesthetics causing adverse effect in therapeutic use     delayed recovery       Past Surgical History:   Procedure Laterality Date     SECTION      x2    COLONOSCOPY  2018    Colonoscopy Dr. Wolf Marsh-- Recomended colonoscopy in 10 years.     COSMETIC SURGERY      breast lift tummy tuck    HYSTERECTOMY      pelvic pain    SHOULDER ARTHROSCOPY      TONSILLECTOMY      TOTAL REDUCTION  "MAMMOPLASTY Bilateral 2006       Family History   Problem Relation Age of Onset    Neuropathy Mother     Hypertension Mother     Dementia Mother     Pancreatic cancer Father     Diabetes Father     Thyroid disease Sister     Hernia Sister     Scleroderma Sister     Diabetes Brother     No Known Problems Daughter     Asthma Son     ADD / ADHD Son     No Known Problems Sister        Social History     Tobacco Use    Smoking status: Never Smoker    Smokeless tobacco: Never Used   Substance Use Topics    Alcohol use: Yes     Comment: social    Drug use: No       Social History     Social History Narrative    . 2 children       ALLERGIES: Review of patient's allergies indicates:  No Known Allergies    MEDS:   Current Outpatient Medications:     FLUoxetine 10 MG Tab, Take 1 tablet (10 mg total) by mouth once daily., Disp: 90 tablet, Rfl: 0    lisdexamfetamine (VYVANSE) 40 MG Cap, Take 1 capsule (40 mg total) by mouth once daily., Disp: 30 capsule, Rfl: 0    varicella-zoster gE-AS01B, PF, (SHINGRIX, PF,) 50 mcg/0.5 mL injection, Inject 0.5mL IM at 0 and 2-6 months (Patient not taking: Reported on 12/24/2019), Disp: 0.5 mL, Rfl: 1    OBJECTIVE:   There were no vitals filed for this visit.  There is no height or weight on file to calculate BMI.    Physical Exam   Constitutional: No distress.   Pulmonary/Chest: Effort normal.   Neurological: She is alert.   Psychiatric:   Mood: "good"  Affect: full angelic         Depression Patient Health Questionnaire 1/7/2020 12/24/2019 10/8/2019 7/16/2019   Over the last two weeks how often have you been bothered by little interest or pleasure in doing things 0 0 0 0   Over the last two weeks how often have you been bothered by feeling down, depressed or hopeless 0 0 0 0   PHQ-2 Total Score 0 0 0 0       ASSESSMENT/PLAN:  Problem List Items Addressed This Visit        Psychiatric    Attention deficit hyperactivity disorder (ADHD), combined type - Primary    " Current Assessment & Plan     - well controlled  - continue current medications  -  reviewed         Relevant Medications    lisdexamfetamine (VYVANSE) 40 MG Cap    Anxiety    Current Assessment & Plan     - well controlled  - continue current medications  - counseling on sleep hygiene and recommendations to improve sleep               ORDERS:   Orders Placed This Encounter    lisdexamfetamine (VYVANSE) 40 MG Cap     Has insurance until 7/30/2020  Vaccines recommended: up to date    Follow-up in 3 months with labs prior or sooner if any concerns    Dr. Emeli Blackburn D.O.   Family Medicine

## 2020-05-07 ENCOUNTER — OFFICE VISIT (OUTPATIENT)
Dept: FAMILY MEDICINE | Facility: CLINIC | Age: 50
End: 2020-05-07
Payer: COMMERCIAL

## 2020-05-07 DIAGNOSIS — F90.2 ATTENTION DEFICIT HYPERACTIVITY DISORDER (ADHD), COMBINED TYPE: Primary | ICD-10-CM

## 2020-05-07 DIAGNOSIS — F41.9 ANXIETY: ICD-10-CM

## 2020-05-07 PROBLEM — R41.3 MEMORY CHANGES: Status: RESOLVED | Noted: 2020-01-07 | Resolved: 2020-05-07

## 2020-05-07 PROCEDURE — 99214 PR OFFICE/OUTPT VISIT, EST, LEVL IV, 30-39 MIN: ICD-10-PCS | Mod: 95,,, | Performed by: FAMILY MEDICINE

## 2020-05-07 PROCEDURE — 99214 OFFICE O/P EST MOD 30 MIN: CPT | Mod: 95,,, | Performed by: FAMILY MEDICINE

## 2020-05-07 RX ORDER — LISDEXAMFETAMINE DIMESYLATE 40 MG/1
40 CAPSULE ORAL DAILY
Qty: 30 CAPSULE | Refills: 0 | Status: SHIPPED | OUTPATIENT
Start: 2020-05-07 | End: 2020-06-23 | Stop reason: SDUPTHER

## 2020-05-07 NOTE — ASSESSMENT & PLAN NOTE
- well controlled  - continue current medications  - counseling on sleep hygiene and recommendations to improve sleep

## 2020-08-04 ENCOUNTER — PATIENT MESSAGE (OUTPATIENT)
Dept: FAMILY MEDICINE | Facility: CLINIC | Age: 50
End: 2020-08-04

## 2020-08-05 ENCOUNTER — PATIENT MESSAGE (OUTPATIENT)
Dept: FAMILY MEDICINE | Facility: CLINIC | Age: 50
End: 2020-08-05

## 2020-08-05 DIAGNOSIS — F90.2 ATTENTION DEFICIT HYPERACTIVITY DISORDER (ADHD), COMBINED TYPE: Primary | ICD-10-CM

## 2020-08-06 RX ORDER — DEXTROAMPHETAMINE SACCHARATE, AMPHETAMINE ASPARTATE MONOHYDRATE, DEXTROAMPHETAMINE SULFATE AND AMPHETAMINE SULFATE 5; 5; 5; 5 MG/1; MG/1; MG/1; MG/1
20 CAPSULE, EXTENDED RELEASE ORAL EVERY MORNING
Qty: 30 CAPSULE | Refills: 0 | Status: SHIPPED | OUTPATIENT
Start: 2020-08-06 | End: 2020-08-08 | Stop reason: SDUPTHER

## 2020-08-06 RX ORDER — DEXTROAMPHETAMINE SACCHARATE, AMPHETAMINE ASPARTATE MONOHYDRATE, DEXTROAMPHETAMINE SULFATE AND AMPHETAMINE SULFATE 5; 5; 5; 5 MG/1; MG/1; MG/1; MG/1
20 CAPSULE, EXTENDED RELEASE ORAL EVERY MORNING
Qty: 30 CAPSULE | Refills: 0 | Status: SHIPPED | OUTPATIENT
Start: 2020-08-06 | End: 2020-08-06

## 2020-08-07 ENCOUNTER — PATIENT MESSAGE (OUTPATIENT)
Dept: FAMILY MEDICINE | Facility: CLINIC | Age: 50
End: 2020-08-07

## 2020-08-07 DIAGNOSIS — F90.2 ATTENTION DEFICIT HYPERACTIVITY DISORDER (ADHD), COMBINED TYPE: ICD-10-CM

## 2020-08-08 RX ORDER — DEXTROAMPHETAMINE SACCHARATE, AMPHETAMINE ASPARTATE MONOHYDRATE, DEXTROAMPHETAMINE SULFATE AND AMPHETAMINE SULFATE 5; 5; 5; 5 MG/1; MG/1; MG/1; MG/1
20 CAPSULE, EXTENDED RELEASE ORAL EVERY MORNING
Qty: 30 CAPSULE | Refills: 0 | Status: SHIPPED | OUTPATIENT
Start: 2020-08-08 | End: 2020-10-15

## 2020-10-01 NOTE — PROGRESS NOTES
CRS Office Visit History and Physical      SUBJECTIVE:     Chief Complaint: Hemorrhoids    History of Present Illness:  The patient is new patient to this practice.   Course is as follows:  Patient is a 50 y.o. female presents with bright red blood with BMs.  No pain.  Had banding at [facility] in 2018 for similar symptoms, which did help.  Symptoms have been present for several months.  Previous anorectal procedures: yes  denies straining/prolonged time on toilet with bowel movements.  is not currently taking fiber supplement of stool softener  Blood thinners: No  Works as a professional , currently out of work.    Last Colonoscopy: 2018 (Diverticulosis, 10yr interval)  Family history of colorectal cancer or IBD: None.    Review of patient's allergies indicates:  No Known Allergies    Past Medical History:   Diagnosis Date    ADHD (attention deficit hyperactivity disorder)     Anxiety     Complete rotator cuff tear or rupture of right shoulder, not specified as traumatic 2018    Depression     General anesthetics causing adverse effect in therapeutic use     delayed recovery     Past Surgical History:   Procedure Laterality Date     SECTION      x2    COLONOSCOPY  2018    Colonoscopy Dr. Wolf Marsh-- Recomended colonoscopy in 10 years.     COSMETIC SURGERY      breast lift tummy tuck    HYSTERECTOMY      pelvic pain    SHOULDER ARTHROSCOPY      TONSILLECTOMY      TOTAL REDUCTION MAMMOPLASTY Bilateral      Family History   Problem Relation Age of Onset    Neuropathy Mother     Hypertension Mother     Dementia Mother     Pancreatic cancer Father     Diabetes Father     Thyroid disease Sister     Hernia Sister     Scleroderma Sister     Diabetes Brother     No Known Problems Daughter     Asthma Son     ADD / ADHD Son     No Known Problems Sister      Social History     Tobacco Use    Smoking status: Never Smoker    Smokeless tobacco: Never Used  "  Substance Use Topics    Alcohol use: Yes     Comment: social    Drug use: No        Review of Systems:  Review of Systems   Gastrointestinal: Positive for blood in stool. Negative for constipation and diarrhea.   All other systems reviewed and are negative.      OBJECTIVE:     Vital Signs (Most Recent)  BP (!) 122/94 (BP Location: Left arm, Patient Position: Sitting, BP Method: Large (Automatic))   Pulse 62   Ht 5' 8" (1.727 m)   Wt 94.7 kg (208 lb 12.4 oz)   LMP 11/07/2016   BMI 31.74 kg/m²     Physical Exam:  General: White female in no distress   Neuro: Alert and oriented x 4.  Moves all extremities.     HEENT: No icterus.  Trachea midline  Respiratory: Respirations are even and unlabored  Cardiac: Regular rate  Extremities: Warm dry and intact  Skin: No rashes    Anorectal:   External exam: Right skin tag, skin healthy  Digital exam revealed normal tone.    Anoscopy:  Verbal consent was obtained.   Clear plastic anoscope inserted.    Grade III hemorrhoids seen.    Rubber Band Ligation:  Suction applicator was placed above the dentate line.   Rubber bands were deployed in the RA and RP positions.    Patient tolerated the procedure well.       ASSESSMENT/PLAN:     61yo F w/ bleeding internal hemorrhoids, s/p banding today    The patient was instructed to:  Increase water intake to at least 8-10 glasses of water per day.  Take a daily fiber supplement (Konsyl, Benefiber, Metamucil) and increase dietary intake to 20-30 grams/day.  Avoid straining or spending >5min/event with bowel movements.  Follow-up in clinic in 6-8 weeks.    Mariella Almeida MD  Staff Surgeon, Colon and Rectal Surgery  [facility]    "

## 2020-10-04 ENCOUNTER — PATIENT OUTREACH (OUTPATIENT)
Dept: ADMINISTRATIVE | Facility: OTHER | Age: 50
End: 2020-10-04

## 2020-10-04 NOTE — PROGRESS NOTES
LINKS immunization registry updated  Care Everywhere updated  Health Maintenance updated  Chart reviewed for overdue Proactive [facility] (PIERCE) health maintenance testing (CRS, Breast Ca, Diabetic Eye Exam)   Orders entered:N/A

## 2020-10-05 ENCOUNTER — OFFICE VISIT (OUTPATIENT)
Dept: SURGERY | Facility: CLINIC | Age: 50
End: 2020-10-05
Attending: COLON & RECTAL SURGERY
Payer: COMMERCIAL

## 2020-10-05 VITALS
WEIGHT: 208.75 LBS | DIASTOLIC BLOOD PRESSURE: 94 MMHG | HEIGHT: 68 IN | HEART RATE: 62 BPM | SYSTOLIC BLOOD PRESSURE: 122 MMHG | BODY MASS INDEX: 31.64 KG/M2

## 2020-10-05 DIAGNOSIS — K64.2 GRADE III HEMORRHOIDS: Primary | ICD-10-CM

## 2020-10-05 PROCEDURE — 99999 PR PBB SHADOW E&M-EST. PATIENT-LVL III: CPT | Mod: PBBFAC,,, | Performed by: COLON & RECTAL SURGERY

## 2020-10-05 PROCEDURE — 3008F PR BODY MASS INDEX (BMI) DOCUMENTED: ICD-10-PCS | Mod: CPTII,S$GLB,, | Performed by: COLON & RECTAL SURGERY

## 2020-10-05 PROCEDURE — 99203 OFFICE O/P NEW LOW 30 MIN: CPT | Mod: 25,S$GLB,, | Performed by: COLON & RECTAL SURGERY

## 2020-10-05 PROCEDURE — 99203 PR OFFICE/OUTPT VISIT, NEW, LEVL III, 30-44 MIN: ICD-10-PCS | Mod: 25,S$GLB,, | Performed by: COLON & RECTAL SURGERY

## 2020-10-05 PROCEDURE — 46221 PR HEMORRHOIDECTOMY INTERNAL RUBBER BAND LIGATIONS: ICD-10-PCS | Mod: S$GLB,,, | Performed by: COLON & RECTAL SURGERY

## 2020-10-05 PROCEDURE — 46221 LIGATION OF HEMORRHOID(S): CPT | Mod: S$GLB,,, | Performed by: COLON & RECTAL SURGERY

## 2020-10-05 PROCEDURE — 3008F BODY MASS INDEX DOCD: CPT | Mod: CPTII,S$GLB,, | Performed by: COLON & RECTAL SURGERY

## 2020-10-05 PROCEDURE — 99999 PR PBB SHADOW E&M-EST. PATIENT-LVL III: ICD-10-PCS | Mod: PBBFAC,,, | Performed by: COLON & RECTAL SURGERY

## 2020-10-15 ENCOUNTER — OFFICE VISIT (OUTPATIENT)
Dept: FAMILY MEDICINE | Facility: CLINIC | Age: 50
End: 2020-10-15
Payer: COMMERCIAL

## 2020-10-15 VITALS
TEMPERATURE: 98 F | BODY MASS INDEX: 31.39 KG/M2 | WEIGHT: 207.13 LBS | DIASTOLIC BLOOD PRESSURE: 90 MMHG | SYSTOLIC BLOOD PRESSURE: 140 MMHG | OXYGEN SATURATION: 98 % | HEART RATE: 57 BPM | HEIGHT: 68 IN

## 2020-10-15 DIAGNOSIS — Z11.4 ENCOUNTER FOR SCREENING FOR HIV: ICD-10-CM

## 2020-10-15 DIAGNOSIS — F90.2 ATTENTION DEFICIT HYPERACTIVITY DISORDER (ADHD), COMBINED TYPE: ICD-10-CM

## 2020-10-15 DIAGNOSIS — F41.9 ANXIETY: ICD-10-CM

## 2020-10-15 DIAGNOSIS — Z11.59 ENCOUNTER FOR HEPATITIS C SCREENING TEST FOR LOW RISK PATIENT: ICD-10-CM

## 2020-10-15 DIAGNOSIS — Z23 NEEDS FLU SHOT: ICD-10-CM

## 2020-10-15 DIAGNOSIS — R87.612 LOW GRADE SQUAMOUS INTRAEPITHELIAL LESION (LGSIL) ON CERVICAL PAP SMEAR: ICD-10-CM

## 2020-10-15 DIAGNOSIS — R73.01 IMPAIRED FASTING GLUCOSE: Primary | ICD-10-CM

## 2020-10-15 DIAGNOSIS — Z12.31 ENCOUNTER FOR SCREENING MAMMOGRAM FOR MALIGNANT NEOPLASM OF BREAST: ICD-10-CM

## 2020-10-15 DIAGNOSIS — Z00.00 HEALTH CARE MAINTENANCE: ICD-10-CM

## 2020-10-15 PROCEDURE — 99214 OFFICE O/P EST MOD 30 MIN: CPT | Mod: 25,S$GLB,, | Performed by: INTERNAL MEDICINE

## 2020-10-15 PROCEDURE — 3008F PR BODY MASS INDEX (BMI) DOCUMENTED: ICD-10-PCS | Mod: CPTII,S$GLB,, | Performed by: INTERNAL MEDICINE

## 2020-10-15 PROCEDURE — 99214 PR OFFICE/OUTPT VISIT, EST, LEVL IV, 30-39 MIN: ICD-10-PCS | Mod: 25,S$GLB,, | Performed by: INTERNAL MEDICINE

## 2020-10-15 PROCEDURE — 90471 FLU VACCINE (QUAD) GREATER THAN OR EQUAL TO 3YO PRESERVATIVE FREE IM: ICD-10-PCS | Mod: S$GLB,,, | Performed by: INTERNAL MEDICINE

## 2020-10-15 PROCEDURE — 99999 PR PBB SHADOW E&M-EST. PATIENT-LVL III: CPT | Mod: PBBFAC,,, | Performed by: INTERNAL MEDICINE

## 2020-10-15 PROCEDURE — 90686 IIV4 VACC NO PRSV 0.5 ML IM: CPT | Mod: S$GLB,,, | Performed by: INTERNAL MEDICINE

## 2020-10-15 PROCEDURE — 99999 PR PBB SHADOW E&M-EST. PATIENT-LVL III: ICD-10-PCS | Mod: PBBFAC,,, | Performed by: INTERNAL MEDICINE

## 2020-10-15 PROCEDURE — 90471 IMMUNIZATION ADMIN: CPT | Mod: S$GLB,,, | Performed by: INTERNAL MEDICINE

## 2020-10-15 PROCEDURE — 3008F BODY MASS INDEX DOCD: CPT | Mod: CPTII,S$GLB,, | Performed by: INTERNAL MEDICINE

## 2020-10-15 PROCEDURE — 90686 FLU VACCINE (QUAD) GREATER THAN OR EQUAL TO 3YO PRESERVATIVE FREE IM: ICD-10-PCS | Mod: S$GLB,,, | Performed by: INTERNAL MEDICINE

## 2020-10-15 RX ORDER — LISDEXAMFETAMINE DIMESYLATE 40 MG/1
40 CAPSULE ORAL DAILY
COMMUNITY
End: 2020-10-15 | Stop reason: SDUPTHER

## 2020-10-15 RX ORDER — LISDEXAMFETAMINE DIMESYLATE 40 MG/1
40 CAPSULE ORAL DAILY
Qty: 30 CAPSULE | Refills: 0 | Status: SHIPPED | OUTPATIENT
Start: 2020-10-15 | End: 2020-12-15 | Stop reason: SDUPTHER

## 2020-10-15 NOTE — PROGRESS NOTES
[facility] Note    Chief Complaint      Chief Complaint   Patient presents with    Establish Care     pt here to [facility] care      History of Present Illness      Hayley Montana is a 50 y.o. female who presents today for chief complaint follow up chronic. Patient is new to me.    PCP: Christen Horton MD  Patient comes to appointment alone.   Ky Gomez's sister     HPI   ADHD had been on vyvanse long term, was recently changed to adderall but cannot tao bc feeling very depressed on adderall,   Anxiety : is on prozac after changing from lexapro   Is changing to Yary    Dad  of pancreatic cancer   Active Problem List with Overview Notes    Diagnosis Date Noted    Low grade squamous intraepithelial lesion (LGSIL) on cervical Pap smear 2020     - 10/2019 PAP by Dr. Lance  - Dr. Lance following      Impaired fasting glucose 2020    History of herpes labialis 2019    Irritable bowel syndrome with diarrhea 2019    Attention deficit hyperactivity disorder (ADHD), combined type 2019    Anxiety 2019       Health Maintenance   Topic Date Due    Hepatitis C Screening  1970    Mammogram  2021    Pap Smear with HPV Cotest  10/22/2024    Lipid Panel  2024    TETANUS VACCINE  2029       Past Medical History:   Diagnosis Date    ADHD (attention deficit hyperactivity disorder)     Anxiety     Complete rotator cuff tear or rupture of right shoulder, not specified as traumatic 2018    Depression     General anesthetics causing adverse effect in therapeutic use     delayed recovery       Past Surgical History:   Procedure Laterality Date     SECTION      x2    COLONOSCOPY  2018    Colonoscopy Dr. Wolf Marsh-- Recomended colonoscopy in 10 years.     COSMETIC SURGERY      breast lift tummy tuck    HYSTERECTOMY      pelvic pain    SHOULDER ARTHROSCOPY      TONSILLECTOMY      TOTAL REDUCTION  MAMMOPLASTY Bilateral 2006       family history includes ADD / ADHD in her son; Asthma in her son; Dementia in her mother; Diabetes in her brother and father; Hernia in her sister; Hypertension in her mother; Neuropathy in her mother; No Known Problems in her daughter and sister; Pancreatic cancer in her father; Scleroderma in her sister; Thyroid disease in her sister.    Social History     Tobacco Use    Smoking status: Never Smoker    Smokeless tobacco: Never Used   Substance Use Topics    Alcohol use: Yes     Comment: social    Drug use: No       Review of Systems   Constitutional: Negative for chills and fever.   HENT: Negative for congestion and sore throat.    Eyes: Negative for blurred vision and discharge.   Respiratory: Negative for cough and shortness of breath.    Cardiovascular: Negative for chest pain and palpitations.   Gastrointestinal: Negative for constipation, diarrhea, nausea and vomiting.   Genitourinary: Negative for dysuria and hematuria.   Musculoskeletal: Negative for falls and myalgias.   Skin: Negative for itching and rash.   Neurological: Negative for dizziness and headaches.        Outpatient Encounter Medications as of 10/15/2020   Medication Sig Dispense Refill    FLUoxetine 10 MG Tab TAKE 1 TABLET BY MOUTH EVERY DAY 90 tablet 3    lisdexamfetamine (VYVANSE) 40 MG Cap Take 1 capsule (40 mg total) by mouth once daily. 30 capsule 0    [DISCONTINUED] lisdexamfetamine (VYVANSE) 40 MG Cap Take 40 mg by mouth once daily.      [DISCONTINUED] dextroamphetamine-amphetamine (ADDERALL XR) 20 MG 24 hr capsule Take 1 capsule (20 mg total) by mouth every morning. (Patient not taking: Reported on 10/15/2020) 30 capsule 0    [DISCONTINUED] varicella-zoster gE-AS01B, PF, (SHINGRIX, PF,) 50 mcg/0.5 mL injection Inject 0.5mL IM at 0 and 2-6 months 0.5 mL 1     No facility-administered encounter medications on file as of 10/15/2020.         Review of patient's allergies indicates:  No Known  "Allergies        Physical Exam      Vital Signs  Temp: 97.6 °F (36.4 °C)  Temp src: Oral  Pulse: (!) 57  SpO2: 98 %  BP: (!) 140/90  Pain Score: 0-No pain  Height and Weight  Height: 5' 8" (172.7 cm)  Weight: 93.9 kg (207 lb 2 oz)  BSA (Calculated - sq m): 2.12 sq meters  BMI (Calculated): 31.5  Weight in (lb) to have BMI = 25: 164.1]    Physical Exam  Vitals signs reviewed.   Constitutional:       Appearance: She is well-developed.   HENT:      Head: Normocephalic and atraumatic.      Right Ear: External ear normal.      Left Ear: External ear normal.   Eyes:      General:         Right eye: No discharge.         Left eye: No discharge.   Neck:      Musculoskeletal: Normal range of motion.      Thyroid: No thyromegaly.   Cardiovascular:      Rate and Rhythm: Normal rate and regular rhythm.      Heart sounds: Normal heart sounds. No murmur.   Pulmonary:      Effort: Pulmonary effort is normal. No respiratory distress.      Breath sounds: Normal breath sounds.   Abdominal:      General: Bowel sounds are normal. There is no distension.      Palpations: Abdomen is soft.      Tenderness: There is no abdominal tenderness.   Musculoskeletal: Normal range of motion.         General: No deformity.   Skin:     General: Skin is warm and dry.      Findings: No rash.   Neurological:      Mental Status: She is alert and oriented to person, place, and time.   Psychiatric:         Behavior: Behavior normal.              Assessment/Plan     Hayley Montana is a 50 y.o.female with:    Attention deficit hyperactivity disorder (ADHD), combined type  back to [facility]    Anxiety  Stable on prozac     Impaired fasting glucose  Laura a1c     Low grade squamous intraepithelial lesion (LGSIL) on cervical Pap smear  Has appt to [facility] with [facility] next week       Orders Placed This Encounter   Procedures    Mammo Digital Screening Bilat w/ Anderson     Standing Status:   Future     Standing Expiration Date:   12/15/2021     Order Specific Question:   " May the Radiologist modify the order per protocol to meet the clinical needs of the patient?     Answer:   Yes    Influenza - Quadrivalent (PF)    CBC auto differential     Standing Status:   Future     Number of Occurrences:   1     Standing Expiration Date:   12/14/2021    Lipid Panel     Standing Status:   Future     Number of Occurrences:   1     Standing Expiration Date:   12/14/2021    Comprehensive Metabolic Panel     Standing Status:   Future     Number of Occurrences:   1     Standing Expiration Date:   12/14/2021    Hemoglobin A1C     Standing Status:   Future     Number of Occurrences:   1     Standing Expiration Date:   12/14/2021    HIV 1/2 Ag/Ab (4th Gen)     Standing Status:   Future     Number of Occurrences:   1     Standing Expiration Date:   12/14/2021    Hepatitis C Antibody     Standing Status:   Future     Number of Occurrences:   1     Standing Expiration Date:   12/14/2021    T4, Free     Standing Status:   Future     Number of Occurrences:   1     Standing Expiration Date:   12/15/2021    TSH     Standing Status:   Future     Number of Occurrences:   1     Standing Expiration Date:   12/15/2021       Use of the [employer] Patient Portal discussed and encouraged during today's visit  -Continue current medications and maintain follow up with specialists.  Return to clinic in 6 months.  Future Appointments   Date Time Provider Department Center   10/23/2020  9:00 AM Mariella Pringle MD [facility] [place]   11/2/2020  8:00 AM [facility] [place]   12/14/2020  9:00 AM Emeli Blackburn DO [facility] [place]   12/16/2020  9:00 AM Emeli Blackburn DO [facility] [place]   4/26/2021  7:30 AM Christen Horton MD [facility] [place]       Christen Horton MD  10/15/2020 11:24 AM    Primary Care Internal Medicine - [facility]

## 2020-10-23 ENCOUNTER — OFFICE VISIT (OUTPATIENT)
Dept: OBSTETRICS AND GYNECOLOGY | Facility: CLINIC | Age: 50
End: 2020-10-23
Payer: COMMERCIAL

## 2020-10-23 VITALS
WEIGHT: 205.81 LBS | HEIGHT: 68 IN | BODY MASS INDEX: 31.19 KG/M2 | SYSTOLIC BLOOD PRESSURE: 130 MMHG | DIASTOLIC BLOOD PRESSURE: 80 MMHG

## 2020-10-23 DIAGNOSIS — Z11.51 ENCOUNTER FOR SCREENING FOR HUMAN PAPILLOMAVIRUS (HPV): ICD-10-CM

## 2020-10-23 DIAGNOSIS — N39.0 FREQUENT UTI: ICD-10-CM

## 2020-10-23 DIAGNOSIS — Z12.31 BREAST CANCER SCREENING BY MAMMOGRAM: ICD-10-CM

## 2020-10-23 DIAGNOSIS — Z12.4 PAP SMEAR FOR CERVICAL CANCER SCREENING: ICD-10-CM

## 2020-10-23 DIAGNOSIS — N95.1 MENOPAUSAL STATE: ICD-10-CM

## 2020-10-23 DIAGNOSIS — Z01.419 ENCOUNTER FOR ANNUAL ROUTINE GYNECOLOGICAL EXAMINATION: Primary | ICD-10-CM

## 2020-10-23 PROCEDURE — 87624 HPV HI-RISK TYP POOLED RSLT: CPT

## 2020-10-23 PROCEDURE — 99999 PR PBB SHADOW E&M-EST. PATIENT-LVL III: ICD-10-PCS | Mod: PBBFAC,,, | Performed by: OBSTETRICS & GYNECOLOGY

## 2020-10-23 PROCEDURE — 99386 PREV VISIT NEW AGE 40-64: CPT | Mod: S$GLB,,, | Performed by: OBSTETRICS & GYNECOLOGY

## 2020-10-23 PROCEDURE — 99386 PR PREVENTIVE VISIT,NEW,40-64: ICD-10-PCS | Mod: S$GLB,,, | Performed by: OBSTETRICS & GYNECOLOGY

## 2020-10-23 PROCEDURE — 88175 CYTOPATH C/V AUTO FLUID REDO: CPT

## 2020-10-23 PROCEDURE — 3008F PR BODY MASS INDEX (BMI) DOCUMENTED: ICD-10-PCS | Mod: CPTII,S$GLB,, | Performed by: OBSTETRICS & GYNECOLOGY

## 2020-10-23 PROCEDURE — 3008F BODY MASS INDEX DOCD: CPT | Mod: CPTII,S$GLB,, | Performed by: OBSTETRICS & GYNECOLOGY

## 2020-10-23 PROCEDURE — 99999 PR PBB SHADOW E&M-EST. PATIENT-LVL III: CPT | Mod: PBBFAC,,, | Performed by: OBSTETRICS & GYNECOLOGY

## 2020-10-23 RX ORDER — NITROFURANTOIN 25; 75 MG/1; MG/1
100 CAPSULE ORAL 2 TIMES DAILY
Qty: 10 CAPSULE | Refills: 0 | Status: SHIPPED | OUTPATIENT
Start: 2020-10-23 | End: 2020-10-28

## 2020-10-25 NOTE — PROGRESS NOTES
Chief Complaint: Well Woman Exam   Patient new to me. Prior GYN Dr. Lance at [facility].  HPI:      Hayley Montana is a 50 y.o.  who presents today for well woman exam.  LMP: Patient's last menstrual period was 2016.  No issues, problems, or complaints. Specifically, patient denies abnormal vaginal bleeding, discharge, pelvic pain, urinary problems, or changes in appetite. Ms. Montana is currently sexually active with a single male partner. She is currently using hysterectomy for contraception. She declines STD screening today. Reports occasional mild hot flashes and denies vaginal dryness or dyspareunia.     She reports a history of recurrent urinary tract infections that have been less in past few years. She is requesting one dose of Macrobid in case symptoms begin. She was counseled to notify us so that we can get a sample before starting if possible.    Previous Pap: Last pap was 10/22/19 and abnormal LSIL with negative HPV. colposcopy in May with history of abnormal pap smear post-hysterectomy for past 5 years. (No result found) No records for review. Prior hysterectomy done for benign reason. Denies procedures or referral to GYN Oncology.  Previous Mammogram: 2019 at [facility] normal per patient. No records for review today.  Colonoscopy: up to date per PCP    Gardasil:has never had   Flu shot received earlier this month    OB History        2    Para   2    Term   2            AB        Living   2       SAB        TAB        Ectopic        Multiple        Live Births   2           Obstetric Comments   Menarche at 12             ROS:     GENERAL: Denies unintentional weight gain or weight loss. Feeling well overall.   SKIN: Denies rash or lesions.   HEENT: Denies headaches, or vision changes.   CARDIOVASCULAR: Denies palpitations or chest pain.   RESPIRATORY: Denies shortness of breath or dyspnea on exertion.  BREASTS: Denies pain, lumps, or nipple discharge.   ABDOMEN: Denies abdominal pain,  "constipation, diarrhea, nausea, vomiting, change in appetite.  URINARY: Denies frequency, dysuria, hematuria.  NEUROLOGIC: Denies syncope or weakness.   PSYCHIATRIC: Denies depression, anxiety or mood swings.    Physical Exam:      PHYSICAL EXAM:  /80   Ht 5' 8" (1.727 m)   Wt 93.4 kg (205 lb 12.8 oz)   LMP 11/07/2016   BMI 31.29 kg/m²   Body mass index is 31.29 kg/m².     APPEARANCE: Well nourished, well developed, in no acute distress.  PSYCH: Appropriate mood and affect.  SKIN: No acne or hirsutism  NECK: Neck symmetric without masses or thyromegaly  NODES: No inguinal, axillary, or supraclavicular lymph node enlargement  ABDOMEN: Soft.  No tenderness or masses.    CARDIOVASCULAR: No edema of peripheral extremities  BREASTS: Symmetrical, no skin changes or visible lesions.  No palpable masses or nipple discharge bilaterally.  PELVIC: Normal external genitalia without lesions.  Normal hair distribution.  Adequate perineal body, normal urethral meatus.  Vagina with mild atrophic changes without lesions or discharge. No significant cystocele or rectocele. Adnexa without masses or tenderness.      Assessment/Plan:     Encounter for annual routine gynecological examination  Normal exam today. History reviewed regarding abnormal pap smears. Records requested. Pap with HPV done. Mammogram ordered. One Rx of Macrobid prescribed with recommendation to notify and get urinalysis when symptoms begin before starting meds.  Pap smear for cervical cancer screening  -     Liquid-Based Pap Smear, Screening    Encounter for screening for human papillomavirus (HPV)  -     HPV High Risk Genotypes, PCR    Breast cancer screening by mammogram  -     Mammo Digital Screening Maxx w/ [facility]; Future; Expected date: 10/23/2020    Frequent UTI  -     nitrofurantoin, macrocrystal-monohydrate, (MACROBID) 100 MG capsule; Take 1 capsule (100 mg total) by mouth 2 (two) times daily. for 5 days  Dispense: 10 capsule; Refill: " 0    Menopausal state  Mild symptoms. Osteoporosis prevention discussed.    RTC 1 year or prn      Counseling:     Patient was counseled today on current ASCCP pap guidelines, the recommendation for yearly pelvic exams, healthy diet and exercise routines, breast self awareness and annual mammograms.She is to see her PCP for other health maintenance.     Use of the [employer] Patient Portal discussed and encouraged during today's visit.       Mariella Pringle MD

## 2020-11-03 LAB
HPV HR 12 DNA SPEC QL NAA+PROBE: NEGATIVE
HPV16 AG SPEC QL: NEGATIVE
HPV18 DNA SPEC QL NAA+PROBE: NEGATIVE

## 2020-11-30 LAB
FINAL PATHOLOGIC DIAGNOSIS: NORMAL
Lab: NORMAL

## 2020-12-15 ENCOUNTER — PATIENT MESSAGE (OUTPATIENT)
Dept: FAMILY MEDICINE | Facility: CLINIC | Age: 50
End: 2020-12-15

## 2020-12-15 DIAGNOSIS — F90.2 ATTENTION DEFICIT HYPERACTIVITY DISORDER (ADHD), COMBINED TYPE: ICD-10-CM

## 2020-12-15 RX ORDER — LISDEXAMFETAMINE DIMESYLATE 40 MG/1
40 CAPSULE ORAL DAILY
Qty: 30 CAPSULE | Refills: 0 | Status: SHIPPED | OUTPATIENT
Start: 2020-12-15 | End: 2021-01-24 | Stop reason: SDUPTHER

## 2020-12-28 ENCOUNTER — PATIENT MESSAGE (OUTPATIENT)
Dept: SURGERY | Facility: CLINIC | Age: 50
End: 2020-12-28

## 2020-12-29 NOTE — PROGRESS NOTES
CRS Office Visit History and Physical      SUBJECTIVE:     Chief Complaint: Hemorrhoids    History of Present Illness:  Patient is a 50 y.o. female returns with persistent episodes of large amt of bright red blood in toilet following BMs.  Most recent episode was earlier this week.  No precipitating diarrhea/constipation/straining that she can recall. Stools remain soft.  Had banding of RA and RP with me 10/5/2020, bleeding slowed for a bit after this and then started again.  Had banding at [facility] in 2018 for similar symptoms  Blood thinners: No  Works as a professional , currently out of work.    Last Colonoscopy: 2018 (Diverticulosis, 10yr interval)  Family history of colorectal cancer or IBD: None.    Review of patient's allergies indicates:  No Known Allergies    Past Medical History:   Diagnosis Date    ADHD (attention deficit hyperactivity disorder)     Anxiety     Complete rotator cuff tear or rupture of right shoulder, not specified as traumatic 2018    Depression     General anesthetics causing adverse effect in therapeutic use     delayed recovery     Past Surgical History:   Procedure Laterality Date     SECTION      x2    COLONOSCOPY  2018    Colonoscopy Dr. Wolf Marsh-- Recomended colonoscopy in 10 years.     COSMETIC SURGERY      breast lift tummy tuck    HYSTERECTOMY      Indication: Pain    SHOULDER ARTHROSCOPY      TONSILLECTOMY      TOTAL REDUCTION MAMMOPLASTY Bilateral      Family History   Problem Relation Age of Onset    Neuropathy Mother     Hypertension Mother     Dementia Mother     Pancreatic cancer Father     Diabetes Father     Thyroid disease Sister     Hernia Sister     Scleroderma Sister     Diabetes Brother     No Known Problems Daughter     Asthma Son     ADD / ADHD Son     No Known Problems Sister      Social History     Tobacco Use    Smoking status: Never Smoker    Smokeless tobacco: Never Used   Substance Use  "Topics    Alcohol use: Yes     Comment: social    Drug use: No        Review of Systems:  Review of Systems   Gastrointestinal: Positive for blood in stool. Negative for constipation and diarrhea.   All other systems reviewed and are negative.      OBJECTIVE:     Vital Signs (Most Recent)  /68 (BP Location: Left arm, Patient Position: Sitting, BP Method: Large (Automatic))   Pulse 72   Ht 5' 8" (1.727 m)   Wt 90.9 kg (200 lb 6.4 oz)   LMP 11/07/2016   BMI 30.47 kg/m²     Physical Exam:  General: White female in no distress   Neuro: Alert and oriented x 4.  Moves all extremities.     HEENT: No icterus.  Trachea midline  Respiratory: Respirations are even and unlabored  Cardiac: Regular rate  Extremities: Warm dry and intact  Skin: No rashes    Anorectal:   External exam: Right skin tag, skin healthy  Digital exam revealed normal tone.    Anoscopy:  Verbal consent was obtained.   Clear plastic anoscope inserted.    Grade III hemorrhoids seen  No active bleeding, circumferential internal hemorrhoids with some venous engorgement on anoscopy        ASSESSMENT/PLAN:     61yo F w/ bleeding internal hemorrhoids, persistent after banding x 2    Offered repeat/serial banding given primarily internal hemorrhoids vs excisional hemorrhoidectomy.  She would prefer to proceed with excision.  Discussed risks including: pain, bleeding, recurrence, urinary retention, infection, damage to sphincter.  Discussed anticipated recovery time.  Needs Mag Citrate bowel prep. Consent signed in clinic, asked if any additional questions, none at this time.    Mariella Almeida MD  Staff Surgeon, Colon and Rectal Surgery  [facility]      "

## 2020-12-30 ENCOUNTER — TELEPHONE (OUTPATIENT)
Dept: SURGERY | Facility: CLINIC | Age: 50
End: 2020-12-30

## 2020-12-30 ENCOUNTER — OFFICE VISIT (OUTPATIENT)
Dept: SURGERY | Facility: OTHER | Age: 50
End: 2020-12-30
Attending: COLON & RECTAL SURGERY
Payer: COMMERCIAL

## 2020-12-30 VITALS
HEIGHT: 68 IN | HEART RATE: 72 BPM | DIASTOLIC BLOOD PRESSURE: 68 MMHG | WEIGHT: 200.38 LBS | BODY MASS INDEX: 30.37 KG/M2 | SYSTOLIC BLOOD PRESSURE: 118 MMHG

## 2020-12-30 DIAGNOSIS — Z01.818 PRE-OP TESTING: ICD-10-CM

## 2020-12-30 DIAGNOSIS — K64.2 GRADE III HEMORRHOIDS: Primary | ICD-10-CM

## 2020-12-30 PROCEDURE — 46600 PR DIAG2STIC A2SCOPY: ICD-10-PCS | Mod: S$GLB,,, | Performed by: COLON & RECTAL SURGERY

## 2020-12-30 PROCEDURE — 3008F BODY MASS INDEX DOCD: CPT | Mod: CPTII,S$GLB,, | Performed by: COLON & RECTAL SURGERY

## 2020-12-30 PROCEDURE — 1126F AMNT PAIN NOTED NONE PRSNT: CPT | Mod: S$GLB,,, | Performed by: COLON & RECTAL SURGERY

## 2020-12-30 PROCEDURE — 99214 OFFICE O/P EST MOD 30 MIN: CPT | Mod: 25,S$GLB,, | Performed by: COLON & RECTAL SURGERY

## 2020-12-30 PROCEDURE — 99999 PR PBB SHADOW E&M-EST. PATIENT-LVL IV: ICD-10-PCS | Mod: PBBFAC,,, | Performed by: COLON & RECTAL SURGERY

## 2020-12-30 PROCEDURE — 3008F PR BODY MASS INDEX (BMI) DOCUMENTED: ICD-10-PCS | Mod: CPTII,S$GLB,, | Performed by: COLON & RECTAL SURGERY

## 2020-12-30 PROCEDURE — 1126F PR PAIN SEVERITY QUANTIFIED, NO PAIN PRESENT: ICD-10-PCS | Mod: S$GLB,,, | Performed by: COLON & RECTAL SURGERY

## 2020-12-30 PROCEDURE — 99999 PR PBB SHADOW E&M-EST. PATIENT-LVL IV: CPT | Mod: PBBFAC,,, | Performed by: COLON & RECTAL SURGERY

## 2020-12-30 PROCEDURE — 46600 DIAGNOSTIC ANOSCOPY SPX: CPT | Mod: S$GLB,,, | Performed by: COLON & RECTAL SURGERY

## 2020-12-30 PROCEDURE — 99214 PR OFFICE/OUTPT VISIT, EST, LEVL IV, 30-39 MIN: ICD-10-PCS | Mod: 25,S$GLB,, | Performed by: COLON & RECTAL SURGERY

## 2020-12-30 NOTE — TELEPHONE ENCOUNTER
----- Message from Keyla Thomas sent at 12/30/2020  3:54 PM CST -----  Pt called stated she would like to cancel her procedure scheduled for 01/04/21 because she can't make all the arrangement she need to make.  She would call back to reschedule in Feb. 2021

## 2020-12-30 NOTE — TELEPHONE ENCOUNTER
Left message to return call regarding cancelling surgery scheduled for 1/4/21. Will message patient through the portal.

## 2020-12-30 NOTE — TELEPHONE ENCOUNTER
----- Message from Paget Bazile, MA sent at 12/30/2020  2:04 PM CST -----  Please put in a Covid test for this patient.  Her surgery is Monday 1/4/20    Thank you  Paget

## 2020-12-31 ENCOUNTER — TELEPHONE (OUTPATIENT)
Dept: SURGERY | Facility: CLINIC | Age: 50
End: 2020-12-31

## 2020-12-31 NOTE — TELEPHONE ENCOUNTER
Left message to return call regarding patient phone message stating she wanted to cancel her upcoming procedure. Calling to verify this information.

## 2021-01-23 ENCOUNTER — PATIENT MESSAGE (OUTPATIENT)
Dept: FAMILY MEDICINE | Facility: CLINIC | Age: 51
End: 2021-01-23

## 2021-01-23 DIAGNOSIS — F90.2 ATTENTION DEFICIT HYPERACTIVITY DISORDER (ADHD), COMBINED TYPE: ICD-10-CM

## 2021-01-26 RX ORDER — LISDEXAMFETAMINE DIMESYLATE 40 MG/1
40 CAPSULE ORAL DAILY
Qty: 30 CAPSULE | Refills: 0 | Status: SHIPPED | OUTPATIENT
Start: 2021-01-26 | End: 2021-01-28 | Stop reason: SDUPTHER

## 2021-01-27 ENCOUNTER — PATIENT MESSAGE (OUTPATIENT)
Dept: FAMILY MEDICINE | Facility: CLINIC | Age: 51
End: 2021-01-27

## 2021-01-27 DIAGNOSIS — F90.2 ATTENTION DEFICIT HYPERACTIVITY DISORDER (ADHD), COMBINED TYPE: ICD-10-CM

## 2021-01-28 RX ORDER — LISDEXAMFETAMINE DIMESYLATE 40 MG/1
40 CAPSULE ORAL DAILY
Qty: 30 CAPSULE | Refills: 0 | Status: SHIPPED | OUTPATIENT
Start: 2021-01-28 | End: 2021-03-07 | Stop reason: SDUPTHER

## 2021-03-07 ENCOUNTER — PATIENT MESSAGE (OUTPATIENT)
Dept: FAMILY MEDICINE | Facility: CLINIC | Age: 51
End: 2021-03-07

## 2021-03-07 DIAGNOSIS — F90.2 ATTENTION DEFICIT HYPERACTIVITY DISORDER (ADHD), COMBINED TYPE: ICD-10-CM

## 2021-03-08 RX ORDER — LISDEXAMFETAMINE DIMESYLATE 40 MG/1
40 CAPSULE ORAL DAILY
Qty: 30 CAPSULE | Refills: 0 | Status: SHIPPED | OUTPATIENT
Start: 2021-03-08 | End: 2021-03-10 | Stop reason: SDUPTHER

## 2021-03-09 ENCOUNTER — PATIENT MESSAGE (OUTPATIENT)
Dept: FAMILY MEDICINE | Facility: CLINIC | Age: 51
End: 2021-03-09

## 2021-03-09 DIAGNOSIS — F90.2 ATTENTION DEFICIT HYPERACTIVITY DISORDER (ADHD), COMBINED TYPE: ICD-10-CM

## 2021-03-09 RX ORDER — LISDEXAMFETAMINE DIMESYLATE 40 MG/1
40 CAPSULE ORAL DAILY
Qty: 30 CAPSULE | Refills: 0 | Status: CANCELLED | OUTPATIENT
Start: 2021-03-09

## 2021-03-10 ENCOUNTER — PATIENT MESSAGE (OUTPATIENT)
Dept: FAMILY MEDICINE | Facility: CLINIC | Age: 51
End: 2021-03-10

## 2021-03-10 DIAGNOSIS — F90.2 ATTENTION DEFICIT HYPERACTIVITY DISORDER (ADHD), COMBINED TYPE: ICD-10-CM

## 2021-03-11 ENCOUNTER — PATIENT MESSAGE (OUTPATIENT)
Dept: FAMILY MEDICINE | Facility: CLINIC | Age: 51
End: 2021-03-11

## 2021-03-11 RX ORDER — LISDEXAMFETAMINE DIMESYLATE 40 MG/1
40 CAPSULE ORAL DAILY
Qty: 30 CAPSULE | Refills: 0 | Status: SHIPPED | OUTPATIENT
Start: 2021-03-11 | End: 2021-04-14 | Stop reason: SDUPTHER

## 2021-04-14 DIAGNOSIS — F90.2 ATTENTION DEFICIT HYPERACTIVITY DISORDER (ADHD), COMBINED TYPE: ICD-10-CM

## 2021-04-14 RX ORDER — LISDEXAMFETAMINE DIMESYLATE 40 MG/1
40 CAPSULE ORAL DAILY
Qty: 30 CAPSULE | Refills: 0 | Status: SHIPPED | OUTPATIENT
Start: 2021-04-14 | End: 2021-05-20 | Stop reason: SDUPTHER

## 2021-04-26 ENCOUNTER — OFFICE VISIT (OUTPATIENT)
Dept: FAMILY MEDICINE | Facility: CLINIC | Age: 51
End: 2021-04-26
Payer: COMMERCIAL

## 2021-04-26 VITALS
DIASTOLIC BLOOD PRESSURE: 82 MMHG | SYSTOLIC BLOOD PRESSURE: 112 MMHG | BODY MASS INDEX: 31.63 KG/M2 | OXYGEN SATURATION: 97 % | TEMPERATURE: 98 F | WEIGHT: 208.69 LBS | HEIGHT: 68 IN | HEART RATE: 63 BPM

## 2021-04-26 DIAGNOSIS — F90.2 ATTENTION DEFICIT HYPERACTIVITY DISORDER (ADHD), COMBINED TYPE: ICD-10-CM

## 2021-04-26 DIAGNOSIS — F41.9 ANXIETY: ICD-10-CM

## 2021-04-26 DIAGNOSIS — I87.2 VENOUS INSUFFICIENCY: Primary | ICD-10-CM

## 2021-04-26 PROCEDURE — 99214 OFFICE O/P EST MOD 30 MIN: CPT | Mod: S$GLB,,, | Performed by: INTERNAL MEDICINE

## 2021-04-26 PROCEDURE — 3008F BODY MASS INDEX DOCD: CPT | Mod: CPTII,S$GLB,, | Performed by: INTERNAL MEDICINE

## 2021-04-26 PROCEDURE — 1126F PR PAIN SEVERITY QUANTIFIED, NO PAIN PRESENT: ICD-10-PCS | Mod: S$GLB,,, | Performed by: INTERNAL MEDICINE

## 2021-04-26 PROCEDURE — 99999 PR PBB SHADOW E&M-EST. PATIENT-LVL III: CPT | Mod: PBBFAC,,, | Performed by: INTERNAL MEDICINE

## 2021-04-26 PROCEDURE — 99214 PR OFFICE/OUTPT VISIT, EST, LEVL IV, 30-39 MIN: ICD-10-PCS | Mod: S$GLB,,, | Performed by: INTERNAL MEDICINE

## 2021-04-26 PROCEDURE — 3008F PR BODY MASS INDEX (BMI) DOCUMENTED: ICD-10-PCS | Mod: CPTII,S$GLB,, | Performed by: INTERNAL MEDICINE

## 2021-04-26 PROCEDURE — 1126F AMNT PAIN NOTED NONE PRSNT: CPT | Mod: S$GLB,,, | Performed by: INTERNAL MEDICINE

## 2021-04-26 PROCEDURE — 99999 PR PBB SHADOW E&M-EST. PATIENT-LVL III: ICD-10-PCS | Mod: PBBFAC,,, | Performed by: INTERNAL MEDICINE

## 2021-04-28 NOTE — ASSESSMENT & PLAN NOTE
- rec Valtrex as needed with any recurrence and instructed on use  - counseling regarding new medications including expected results, potential side effects, and appropriate use. Questions elicited and answered   Medical Week 3 Survey      Responses   [facility] patient discharged from?  [place]   Does the patient have one of the following disease processes/diagnoses(primary or secondary)?  Other   Week 3 attempt successful?  No   Unsuccessful attempts  Attempt 1          Nurys Bourgeois RN

## 2021-05-04 ENCOUNTER — PATIENT MESSAGE (OUTPATIENT)
Dept: RESEARCH | Facility: HOSPITAL | Age: 51
End: 2021-05-04

## 2021-05-10 ENCOUNTER — PATIENT MESSAGE (OUTPATIENT)
Dept: RESEARCH | Facility: HOSPITAL | Age: 51
End: 2021-05-10

## 2021-05-20 DIAGNOSIS — F41.9 ANXIETY: ICD-10-CM

## 2021-05-20 DIAGNOSIS — F90.2 ATTENTION DEFICIT HYPERACTIVITY DISORDER (ADHD), COMBINED TYPE: ICD-10-CM

## 2021-05-20 RX ORDER — FLUOXETINE 10 MG/1
10 TABLET ORAL DAILY
Qty: 90 TABLET | Refills: 3 | Status: SHIPPED | OUTPATIENT
Start: 2021-05-20 | End: 2021-08-01 | Stop reason: SDUPTHER

## 2021-05-20 RX ORDER — LISDEXAMFETAMINE DIMESYLATE 40 MG/1
40 CAPSULE ORAL DAILY
Qty: 30 CAPSULE | Refills: 0 | Status: SHIPPED | OUTPATIENT
Start: 2021-05-20 | End: 2021-06-21

## 2021-08-01 DIAGNOSIS — F41.9 ANXIETY: ICD-10-CM

## 2021-08-01 DIAGNOSIS — F90.2 ATTENTION DEFICIT HYPERACTIVITY DISORDER (ADHD), COMBINED TYPE: ICD-10-CM

## 2021-08-02 RX ORDER — FLUOXETINE 10 MG/1
10 TABLET ORAL DAILY
Qty: 90 TABLET | Refills: 3 | Status: SHIPPED | OUTPATIENT
Start: 2021-08-02 | End: 2021-10-28

## 2021-08-02 RX ORDER — LISDEXAMFETAMINE DIMESYLATE 40 MG/1
40 CAPSULE ORAL DAILY
Qty: 30 CAPSULE | Refills: 0 | Status: SHIPPED | OUTPATIENT
Start: 2021-08-02 | End: 2021-09-02 | Stop reason: SDUPTHER

## 2021-08-24 ENCOUNTER — PATIENT MESSAGE (OUTPATIENT)
Dept: OBSTETRICS AND GYNECOLOGY | Facility: CLINIC | Age: 51
End: 2021-08-24

## 2021-09-02 DIAGNOSIS — F90.2 ATTENTION DEFICIT HYPERACTIVITY DISORDER (ADHD), COMBINED TYPE: ICD-10-CM

## 2021-09-02 RX ORDER — LISDEXAMFETAMINE DIMESYLATE 40 MG/1
40 CAPSULE ORAL DAILY
Qty: 30 CAPSULE | Refills: 0 | Status: SHIPPED | OUTPATIENT
Start: 2021-09-02 | End: 2021-09-21 | Stop reason: SDUPTHER

## 2021-09-16 ENCOUNTER — PATIENT MESSAGE (OUTPATIENT)
Dept: FAMILY MEDICINE | Facility: CLINIC | Age: 51
End: 2021-09-16

## 2021-09-21 ENCOUNTER — OFFICE VISIT (OUTPATIENT)
Dept: FAMILY MEDICINE | Facility: CLINIC | Age: 51
End: 2021-09-21
Payer: COMMERCIAL

## 2021-09-21 VITALS
WEIGHT: 205.69 LBS | DIASTOLIC BLOOD PRESSURE: 88 MMHG | HEART RATE: 78 BPM | BODY MASS INDEX: 31.17 KG/M2 | SYSTOLIC BLOOD PRESSURE: 130 MMHG | OXYGEN SATURATION: 97 % | RESPIRATION RATE: 16 BRPM | HEIGHT: 68 IN

## 2021-09-21 DIAGNOSIS — F90.2 ATTENTION DEFICIT HYPERACTIVITY DISORDER (ADHD), COMBINED TYPE: ICD-10-CM

## 2021-09-21 DIAGNOSIS — L98.9 SKIN LESION: ICD-10-CM

## 2021-09-21 DIAGNOSIS — Z12.31 ENCOUNTER FOR SCREENING MAMMOGRAM FOR MALIGNANT NEOPLASM OF BREAST: ICD-10-CM

## 2021-09-21 DIAGNOSIS — Z00.00 WELLNESS EXAMINATION: ICD-10-CM

## 2021-09-21 DIAGNOSIS — M54.9 BACK PAIN, UNSPECIFIED BACK LOCATION, UNSPECIFIED BACK PAIN LATERALITY, UNSPECIFIED CHRONICITY: ICD-10-CM

## 2021-09-21 DIAGNOSIS — R68.89 FORGETFULNESS: Primary | ICD-10-CM

## 2021-09-21 PROCEDURE — 99214 OFFICE O/P EST MOD 30 MIN: CPT | Mod: S$GLB,,, | Performed by: INTERNAL MEDICINE

## 2021-09-21 PROCEDURE — 99214 PR OFFICE/OUTPT VISIT, EST, LEVL IV, 30-39 MIN: ICD-10-PCS | Mod: S$GLB,,, | Performed by: INTERNAL MEDICINE

## 2021-09-21 PROCEDURE — 3079F PR MOST RECENT DIASTOLIC BLOOD PRESSURE 80-89 MM HG: ICD-10-PCS | Mod: CPTII,S$GLB,, | Performed by: INTERNAL MEDICINE

## 2021-09-21 PROCEDURE — 3079F DIAST BP 80-89 MM HG: CPT | Mod: CPTII,S$GLB,, | Performed by: INTERNAL MEDICINE

## 2021-09-21 PROCEDURE — 1159F PR MEDICATION LIST DOCUMENTED IN MEDICAL RECORD: ICD-10-PCS | Mod: CPTII,S$GLB,, | Performed by: INTERNAL MEDICINE

## 2021-09-21 PROCEDURE — 1159F MED LIST DOCD IN RCRD: CPT | Mod: CPTII,S$GLB,, | Performed by: INTERNAL MEDICINE

## 2021-09-21 PROCEDURE — 99999 PR PBB SHADOW E&M-EST. PATIENT-LVL III: ICD-10-PCS | Mod: PBBFAC,,, | Performed by: INTERNAL MEDICINE

## 2021-09-21 PROCEDURE — 3008F BODY MASS INDEX DOCD: CPT | Mod: CPTII,S$GLB,, | Performed by: INTERNAL MEDICINE

## 2021-09-21 PROCEDURE — 99999 PR PBB SHADOW E&M-EST. PATIENT-LVL III: CPT | Mod: PBBFAC,,, | Performed by: INTERNAL MEDICINE

## 2021-09-21 PROCEDURE — 3008F PR BODY MASS INDEX (BMI) DOCUMENTED: ICD-10-PCS | Mod: CPTII,S$GLB,, | Performed by: INTERNAL MEDICINE

## 2021-09-21 PROCEDURE — 3075F SYST BP GE 130 - 139MM HG: CPT | Mod: CPTII,S$GLB,, | Performed by: INTERNAL MEDICINE

## 2021-09-21 PROCEDURE — 3075F PR MOST RECENT SYSTOLIC BLOOD PRESS GE 130-139MM HG: ICD-10-PCS | Mod: CPTII,S$GLB,, | Performed by: INTERNAL MEDICINE

## 2021-09-21 RX ORDER — LISDEXAMFETAMINE DIMESYLATE 40 MG/1
40 CAPSULE ORAL DAILY
Qty: 30 CAPSULE | Refills: 0 | Status: SHIPPED | OUTPATIENT
Start: 2021-10-02 | End: 2021-10-06 | Stop reason: SDUPTHER

## 2021-10-05 ENCOUNTER — PATIENT MESSAGE (OUTPATIENT)
Dept: FAMILY MEDICINE | Facility: CLINIC | Age: 51
End: 2021-10-05

## 2021-10-05 DIAGNOSIS — F90.2 ATTENTION DEFICIT HYPERACTIVITY DISORDER (ADHD), COMBINED TYPE: ICD-10-CM

## 2021-10-06 RX ORDER — LISDEXAMFETAMINE DIMESYLATE 40 MG/1
40 CAPSULE ORAL DAILY
Qty: 30 CAPSULE | Refills: 0 | Status: SHIPPED | OUTPATIENT
Start: 2021-10-06 | End: 2021-11-16 | Stop reason: SDUPTHER

## 2021-10-26 ENCOUNTER — PATIENT MESSAGE (OUTPATIENT)
Dept: FAMILY MEDICINE | Facility: CLINIC | Age: 51
End: 2021-10-26
Payer: COMMERCIAL

## 2021-10-27 ENCOUNTER — PATIENT MESSAGE (OUTPATIENT)
Dept: FAMILY MEDICINE | Facility: CLINIC | Age: 51
End: 2021-10-27
Payer: COMMERCIAL

## 2021-10-28 RX ORDER — BUPROPION HYDROCHLORIDE 150 MG/1
150 TABLET ORAL DAILY
Qty: 90 TABLET | Refills: 1 | Status: SHIPPED | OUTPATIENT
Start: 2021-10-28 | End: 2021-12-16

## 2021-11-04 ENCOUNTER — PATIENT MESSAGE (OUTPATIENT)
Dept: FAMILY MEDICINE | Facility: CLINIC | Age: 51
End: 2021-11-04
Payer: COMMERCIAL

## 2021-12-15 ENCOUNTER — TELEPHONE (OUTPATIENT)
Dept: INTERNAL MEDICINE | Facility: CLINIC | Age: 51
End: 2021-12-15
Payer: COMMERCIAL

## 2021-12-16 ENCOUNTER — OFFICE VISIT (OUTPATIENT)
Dept: INTERNAL MEDICINE | Facility: CLINIC | Age: 51
End: 2021-12-16
Payer: COMMERCIAL

## 2021-12-16 DIAGNOSIS — F90.2 ATTENTION DEFICIT HYPERACTIVITY DISORDER (ADHD), COMBINED TYPE: ICD-10-CM

## 2021-12-16 DIAGNOSIS — F41.9 ANXIETY: ICD-10-CM

## 2021-12-16 PROCEDURE — 99213 PR OFFICE/OUTPT VISIT, EST, LEVL III, 20-29 MIN: ICD-10-PCS | Mod: 95,,, | Performed by: INTERNAL MEDICINE

## 2021-12-16 PROCEDURE — 99213 OFFICE O/P EST LOW 20 MIN: CPT | Mod: 95,,, | Performed by: INTERNAL MEDICINE

## 2021-12-16 RX ORDER — BUPROPION HYDROCHLORIDE 150 MG/1
300 TABLET ORAL DAILY
Qty: 180 TABLET | Refills: 1 | Status: SHIPPED | OUTPATIENT
Start: 2021-12-16 | End: 2022-05-24 | Stop reason: SDUPTHER

## 2021-12-20 ENCOUNTER — TELEPHONE (OUTPATIENT)
Dept: OTOLARYNGOLOGY | Facility: CLINIC | Age: 51
End: 2021-12-20
Payer: COMMERCIAL

## 2021-12-22 ENCOUNTER — PATIENT MESSAGE (OUTPATIENT)
Dept: OTOLARYNGOLOGY | Facility: CLINIC | Age: 51
End: 2021-12-22
Payer: COMMERCIAL

## 2021-12-22 ENCOUNTER — PATIENT OUTREACH (OUTPATIENT)
Dept: ADMINISTRATIVE | Facility: OTHER | Age: 51
End: 2021-12-22
Payer: COMMERCIAL

## 2021-12-29 ENCOUNTER — OFFICE VISIT (OUTPATIENT)
Dept: OTOLARYNGOLOGY | Facility: CLINIC | Age: 51
End: 2021-12-29
Payer: COMMERCIAL

## 2021-12-29 VITALS — DIASTOLIC BLOOD PRESSURE: 82 MMHG | SYSTOLIC BLOOD PRESSURE: 123 MMHG | HEART RATE: 78 BPM

## 2021-12-29 DIAGNOSIS — H57.813 BROW PTOSIS, BILATERAL: ICD-10-CM

## 2021-12-29 DIAGNOSIS — J34.2 NASAL SEPTAL DEVIATION: ICD-10-CM

## 2021-12-29 DIAGNOSIS — J34.89 NASAL OBSTRUCTION: Primary | ICD-10-CM

## 2021-12-29 DIAGNOSIS — J31.0 RHINITIS, NON-ALLERGIC: ICD-10-CM

## 2021-12-29 PROCEDURE — 1159F PR MEDICATION LIST DOCUMENTED IN MEDICAL RECORD: ICD-10-PCS | Mod: CPTII,S$GLB,, | Performed by: OTOLARYNGOLOGY

## 2021-12-29 PROCEDURE — 1159F MED LIST DOCD IN RCRD: CPT | Mod: CPTII,S$GLB,, | Performed by: OTOLARYNGOLOGY

## 2021-12-29 PROCEDURE — 1160F PR REVIEW ALL MEDS BY PRESCRIBER/CLIN PHARMACIST DOCUMENTED: ICD-10-PCS | Mod: CPTII,S$GLB,, | Performed by: OTOLARYNGOLOGY

## 2021-12-29 PROCEDURE — 31231 NASAL ENDOSCOPY DX: CPT | Mod: S$GLB,,, | Performed by: OTOLARYNGOLOGY

## 2021-12-29 PROCEDURE — 3079F DIAST BP 80-89 MM HG: CPT | Mod: CPTII,S$GLB,, | Performed by: OTOLARYNGOLOGY

## 2021-12-29 PROCEDURE — 3074F SYST BP LT 130 MM HG: CPT | Mod: CPTII,S$GLB,, | Performed by: OTOLARYNGOLOGY

## 2021-12-29 PROCEDURE — 3074F PR MOST RECENT SYSTOLIC BLOOD PRESSURE < 130 MM HG: ICD-10-PCS | Mod: CPTII,S$GLB,, | Performed by: OTOLARYNGOLOGY

## 2021-12-29 PROCEDURE — 1160F RVW MEDS BY RX/DR IN RCRD: CPT | Mod: CPTII,S$GLB,, | Performed by: OTOLARYNGOLOGY

## 2021-12-29 PROCEDURE — 3044F PR MOST RECENT HEMOGLOBIN A1C LEVEL <7.0%: ICD-10-PCS | Mod: CPTII,S$GLB,, | Performed by: OTOLARYNGOLOGY

## 2021-12-29 PROCEDURE — 99204 OFFICE O/P NEW MOD 45 MIN: CPT | Mod: 25,S$GLB,, | Performed by: OTOLARYNGOLOGY

## 2021-12-29 PROCEDURE — 99999 PR PBB SHADOW E&M-EST. PATIENT-LVL II: ICD-10-PCS | Mod: PBBFAC,,, | Performed by: OTOLARYNGOLOGY

## 2021-12-29 PROCEDURE — 31231 PR NASAL ENDOSCOPY, DX: ICD-10-PCS | Mod: S$GLB,,, | Performed by: OTOLARYNGOLOGY

## 2021-12-29 PROCEDURE — 3044F HG A1C LEVEL LT 7.0%: CPT | Mod: CPTII,S$GLB,, | Performed by: OTOLARYNGOLOGY

## 2021-12-29 PROCEDURE — 3079F PR MOST RECENT DIASTOLIC BLOOD PRESSURE 80-89 MM HG: ICD-10-PCS | Mod: CPTII,S$GLB,, | Performed by: OTOLARYNGOLOGY

## 2021-12-29 PROCEDURE — 99204 PR OFFICE/OUTPT VISIT, NEW, LEVL IV, 45-59 MIN: ICD-10-PCS | Mod: 25,S$GLB,, | Performed by: OTOLARYNGOLOGY

## 2021-12-29 PROCEDURE — 99999 PR PBB SHADOW E&M-EST. PATIENT-LVL II: CPT | Mod: PBBFAC,,, | Performed by: OTOLARYNGOLOGY

## 2021-12-29 RX ORDER — FLUTICASONE PROPIONATE 50 MCG
2 SPRAY, SUSPENSION (ML) NASAL DAILY
Qty: 18.2 ML | Refills: 11 | Status: SHIPPED | OUTPATIENT
Start: 2021-12-29 | End: 2022-01-28

## 2022-01-04 ENCOUNTER — TELEPHONE (OUTPATIENT)
Dept: INTERNAL MEDICINE | Facility: CLINIC | Age: 52
End: 2022-01-04
Payer: COMMERCIAL

## 2022-01-04 NOTE — TELEPHONE ENCOUNTER
----- Message from Aureliano Landers sent at 1/4/2022  2:05 PM CST -----  Type:  Needs Medical Advice    Who Called: self  Reason:got a text to contact you but the test didn't say why   Would the patient rather a call back or a response via [facility]? call  Best Call Back Number: [phone]  Additional Information: none

## 2022-04-07 ENCOUNTER — OFFICE VISIT (OUTPATIENT)
Dept: INTERNAL MEDICINE | Facility: CLINIC | Age: 52
End: 2022-04-07
Payer: COMMERCIAL

## 2022-04-07 DIAGNOSIS — F41.9 ANXIETY: ICD-10-CM

## 2022-04-07 DIAGNOSIS — F90.2 ATTENTION DEFICIT HYPERACTIVITY DISORDER (ADHD), COMBINED TYPE: ICD-10-CM

## 2022-04-07 PROCEDURE — 1160F PR REVIEW ALL MEDS BY PRESCRIBER/CLIN PHARMACIST DOCUMENTED: ICD-10-PCS | Mod: CPTII,95,, | Performed by: INTERNAL MEDICINE

## 2022-04-07 PROCEDURE — 99213 OFFICE O/P EST LOW 20 MIN: CPT | Mod: 95,,, | Performed by: INTERNAL MEDICINE

## 2022-04-07 PROCEDURE — 1160F RVW MEDS BY RX/DR IN RCRD: CPT | Mod: CPTII,95,, | Performed by: INTERNAL MEDICINE

## 2022-04-07 PROCEDURE — 1159F PR MEDICATION LIST DOCUMENTED IN MEDICAL RECORD: ICD-10-PCS | Mod: CPTII,95,, | Performed by: INTERNAL MEDICINE

## 2022-04-07 PROCEDURE — 1159F MED LIST DOCD IN RCRD: CPT | Mod: CPTII,95,, | Performed by: INTERNAL MEDICINE

## 2022-04-07 PROCEDURE — 99213 PR OFFICE/OUTPT VISIT, EST, LEVL III, 20-29 MIN: ICD-10-PCS | Mod: 95,,, | Performed by: INTERNAL MEDICINE

## 2022-04-07 RX ORDER — FUROSEMIDE 20 MG/1
20 TABLET ORAL DAILY PRN
Qty: 90 TABLET | Refills: 1 | Status: SHIPPED | OUTPATIENT
Start: 2022-04-07 | End: 2022-08-19

## 2022-04-07 NOTE — PROGRESS NOTES
"[facility]  Internal Medicine Clinic Note  The patient location is: [place]  The chief complaint leading to consultation is: follow up ADHD    Visit type: audiovisual    Face to Face time with patient: 10  10 minutes of total time spent on the encounter, which includes face to face time and non-face to face time preparing to see the patient (eg, review of tests), Obtaining and/or reviewing separately obtained history, Documenting clinical information in the electronic or other health record, Independently interpreting results (not separately reported) and communicating results to the patient/family/caregiver, or Care coordination (not separately reported).         Each patient to whom he or she provides medical services by telemedicine is:  (1) informed of the relationship between the physician and patient and the respective role of any other health care provider with respect to management of the patient; and (2) notified that he or she may decline to receive medical services by telemedicine and may withdraw from such care at any time.    Notes:     Chief Complaint    No chief complaint on file.    History of Present Illness      Hayley Montana is a 52 y.o. female who presents today for chief complaint follow up anxiety and adhd.     HPI   Anxiety: mood is good, "great"  Uses vyvanse PRN, doing well  Concerned about her weight, starting an exercise training program, accountability.   Active Problem List with Overview Notes    Diagnosis Date Noted    Forgetfulness 09/21/2021    Encounter for screening mammogram for malignant neoplasm of breast 09/21/2021    Back pain 09/21/2021    Skin lesion 09/21/2021    Venous insufficiency 04/26/2021    Low grade squamous intraepithelial lesion (LGSIL) on cervical Pap smear 01/07/2020     - 10/2019 PAP by Dr. Lance  - Dr. Lance following      Impaired fasting glucose 01/07/2020    History of herpes labialis 07/16/2019    Irritable bowel syndrome with diarrhea 07/16/2019    " Attention deficit hyperactivity disorder (ADHD), combined type 2019    Anxiety 2019       Health Maintenance   Topic Date Due    Mammogram  11/10/2023    Lipid Panel  2026    TETANUS VACCINE  2029    Hepatitis C Screening  Completed       Past Medical History:   Diagnosis Date    ADHD (attention deficit hyperactivity disorder)     Anxiety     Complete rotator cuff tear or rupture of right shoulder, not specified as traumatic 2018    Depression     General anesthetics causing adverse effect in therapeutic use     delayed recovery       Past Surgical History:   Procedure Laterality Date     SECTION      x2    COLONOSCOPY  2018    Colonoscopy Dr. Wolf Marsh-- Recomended colonoscopy in 10 years.     COSMETIC SURGERY      breast lift tummy tuck    HYSTERECTOMY      Indication: Pain    SHOULDER ARTHROSCOPY      TONSILLECTOMY      TOTAL REDUCTION MAMMOPLASTY Bilateral        family history includes ADD / ADHD in her son; Asthma in her son; Dementia in her mother; Diabetes in her brother and father; Hernia in her sister; Hypertension in her mother; Neuropathy in her mother; No Known Problems in her daughter and sister; Pancreatic cancer in her father; Scleroderma in her sister; Thyroid disease in her sister.    Social History     Tobacco Use    Smoking status: Never Smoker    Smokeless tobacco: Never Used   Substance Use Topics    Alcohol use: Yes     Comment: social    Drug use: No       Review of Systems   Constitutional: Negative for chills, fever, malaise/fatigue and weight loss.   HENT: Negative for hearing loss.    Eyes: Negative for discharge.   Respiratory: Negative for cough, sputum production, shortness of breath and wheezing.    Cardiovascular: Negative for chest pain, palpitations, orthopnea and leg swelling.   Gastrointestinal: Negative for blood in stool, constipation, diarrhea, nausea and vomiting.   Genitourinary: Negative for  dysuria, frequency, hematuria and urgency.   Musculoskeletal: Negative for neck pain.   Neurological: Negative for weakness and headaches.   Endo/Heme/Allergies: Negative for polydipsia.        Outpatient Encounter Medications as of 4/7/2022   Medication Sig Dispense Refill    buPROPion (WELLBUTRIN XL) 150 MG TB24 tablet Take 2 tablets (300 mg total) by mouth once daily. 180 tablet 1    furosemide (LASIX) 20 MG tablet Take 1 tablet (20 mg total) by mouth daily as needed (lower extremity edema). 90 tablet 1    lisdexamfetamine (VYVANSE) 40 MG Cap Take 1 capsule (40 mg total) by mouth once daily. 30 capsule 0     No facility-administered encounter medications on file as of 4/7/2022.        Review of patient's allergies indicates:  No Known Allergies        Physical Exam       ]    Physical Exam  Constitutional:       General: She is not in acute distress.     Appearance: She is well-developed. She is not diaphoretic.   HENT:      Head: Normocephalic and atraumatic.      Right Ear: External ear normal.      Left Ear: External ear normal.      Nose: Nose normal.   Eyes:      General:         Right eye: No discharge.         Left eye: No discharge.      Conjunctiva/sclera: Conjunctivae normal.   Pulmonary:      Effort: Pulmonary effort is normal. No respiratory distress.   Musculoskeletal:         General: Normal range of motion.      Cervical back: Normal range of motion.   Skin:     Coloration: Skin is not pale.      Findings: No rash.   Neurological:      Mental Status: She is alert and oriented to person, place, and time.   Psychiatric:         Behavior: Behavior normal.         Thought Content: Thought content normal.         Judgment: Judgment normal.          Laboratory:  CBC:  No results for input(s): WBC, RBC, HGB, HCT, PLT, MCV, MCH, MCHC in the last 2160 hours.  CMP:  No results for input(s): GLU, CALCIUM, ALBUMIN, PROT, NA, K, CO2, CL, BUN, ALKPHOS, ALT, AST, BILITOT in the last 2160 hours.    Invalid  input(s): CREATININ  URINALYSIS:  No results for input(s): COLORU, CLARITYU, SPECGRAV, PHUR, PROTEINUA, GLUCOSEU, BILIRUBINCON, BLOODU, WBCU, RBCU, BACTERIA, MUCUS, NITRITE, LEUKOCYTESUR, UROBILINOGEN, HYALINECASTS in the last 2160 hours.   LIPIDS:  No results for input(s): TSH, HDL, CHOL, TRIG, LDLCALC, CHOLHDL, NONHDLCHOL, TOTALCHOLEST in the last 2160 hours.  TSH:  No results for input(s): TSH in the last 2160 hours.  A1C:  No results for input(s): HGBA1C in the last 2160 hours.    Radiology:      Assessment/Plan     Hayley Montana is a 52 y.o.female with:    Anxiety  Stable no change    Attention deficit hyperactivity disorder (ADHD), combined type  Doing well no change  meds refilled       No orders of the defined types were placed in this encounter.      Use of the [employer] Patient Portal discussed and encouraged during today's visit  -Continue current medications and maintain follow up with specialists.  Return to clinic in 3 months.  No future appointments.    Christen Horton MD  4/21/2022 7:46 AM    Primary Care Internal Medicine

## 2022-04-21 RX ORDER — LISDEXAMFETAMINE DIMESYLATE 40 MG/1
40 CAPSULE ORAL DAILY
Qty: 30 CAPSULE | Refills: 0 | Status: SHIPPED | OUTPATIENT
Start: 2022-04-21 | End: 2022-05-24 | Stop reason: SDUPTHER

## 2022-05-24 DIAGNOSIS — F90.2 ATTENTION DEFICIT HYPERACTIVITY DISORDER (ADHD), COMBINED TYPE: ICD-10-CM

## 2022-05-24 NOTE — TELEPHONE ENCOUNTER
No new care gaps identified.  [facility] Embedded Care Gaps. Reference number: [id number]. 5/24/2022   2:48:44 PM CDT

## 2022-05-26 RX ORDER — LISDEXAMFETAMINE DIMESYLATE 40 MG/1
40 CAPSULE ORAL DAILY
Qty: 30 CAPSULE | Refills: 0 | Status: SHIPPED | OUTPATIENT
Start: 2022-05-26 | End: 2022-06-25 | Stop reason: SDUPTHER

## 2022-05-26 RX ORDER — BUPROPION HYDROCHLORIDE 150 MG/1
300 TABLET ORAL DAILY
Qty: 180 TABLET | Refills: 1 | Status: SHIPPED | OUTPATIENT
Start: 2022-05-26 | End: 2022-06-25 | Stop reason: SDUPTHER

## 2022-08-01 DIAGNOSIS — F90.2 ATTENTION DEFICIT HYPERACTIVITY DISORDER (ADHD), COMBINED TYPE: ICD-10-CM

## 2022-08-01 RX ORDER — LISDEXAMFETAMINE DIMESYLATE 40 MG/1
40 CAPSULE ORAL DAILY
Qty: 30 CAPSULE | Refills: 0 | Status: SHIPPED | OUTPATIENT
Start: 2022-08-01 | End: 2022-08-19 | Stop reason: SDUPTHER

## 2022-08-01 NOTE — TELEPHONE ENCOUNTER
No new care gaps identified.  [facility] Embedded Care Gaps. Reference number: [id number]. 8/01/2022   12:16:06 PM CDT

## 2022-08-19 ENCOUNTER — OFFICE VISIT (OUTPATIENT)
Dept: INTERNAL MEDICINE | Facility: CLINIC | Age: 52
End: 2022-08-19
Payer: COMMERCIAL

## 2022-08-19 DIAGNOSIS — F90.2 ATTENTION DEFICIT HYPERACTIVITY DISORDER (ADHD), COMBINED TYPE: ICD-10-CM

## 2022-08-19 DIAGNOSIS — R68.89 FORGETFULNESS: ICD-10-CM

## 2022-08-19 DIAGNOSIS — F41.9 ANXIETY: ICD-10-CM

## 2022-08-19 PROCEDURE — 99213 PR OFFICE/OUTPT VISIT, EST, LEVL III, 20-29 MIN: ICD-10-PCS | Mod: 95,,, | Performed by: INTERNAL MEDICINE

## 2022-08-19 PROCEDURE — 1160F PR REVIEW ALL MEDS BY PRESCRIBER/CLIN PHARMACIST DOCUMENTED: ICD-10-PCS | Mod: CPTII,95,, | Performed by: INTERNAL MEDICINE

## 2022-08-19 PROCEDURE — 99213 OFFICE O/P EST LOW 20 MIN: CPT | Mod: 95,,, | Performed by: INTERNAL MEDICINE

## 2022-08-19 PROCEDURE — 1159F PR MEDICATION LIST DOCUMENTED IN MEDICAL RECORD: ICD-10-PCS | Mod: CPTII,95,, | Performed by: INTERNAL MEDICINE

## 2022-08-19 PROCEDURE — 1159F MED LIST DOCD IN RCRD: CPT | Mod: CPTII,95,, | Performed by: INTERNAL MEDICINE

## 2022-08-19 PROCEDURE — 1160F RVW MEDS BY RX/DR IN RCRD: CPT | Mod: CPTII,95,, | Performed by: INTERNAL MEDICINE

## 2022-08-19 RX ORDER — LISDEXAMFETAMINE DIMESYLATE 40 MG/1
40 CAPSULE ORAL DAILY
Qty: 30 CAPSULE | Refills: 0 | Status: SHIPPED | OUTPATIENT
Start: 2022-08-19 | End: 2022-09-26 | Stop reason: SDUPTHER

## 2022-08-19 RX ORDER — BUPROPION HYDROCHLORIDE 150 MG/1
300 TABLET ORAL DAILY
Qty: 180 TABLET | Refills: 1 | Status: SHIPPED | OUTPATIENT
Start: 2022-08-19 | End: 2022-09-26 | Stop reason: SDUPTHER

## 2022-08-19 NOTE — PROGRESS NOTES
[facility]  Internal Medicine Clinic Note  The patient location is: [place]  The chief complaint leading to consultation is: ADHD    Visit type: audiovisual    Face to Face time with patient: 10  10 minutes of total time spent on the encounter, which includes face to face time and non-face to face time preparing to see the patient (eg, review of tests), Obtaining and/or reviewing separately obtained history, Documenting clinical information in the electronic or other health record, Independently interpreting results (not separately reported) and communicating results to the patient/family/caregiver, or Care coordination (not separately reported).         Each patient to whom he or she provides medical services by telemedicine is:  (1) informed of the relationship between the physician and patient and the respective role of any other health care provider with respect to management of the patient; and (2) notified that he or she may decline to receive medical services by telemedicine and may withdraw from such care at any time.    Notes:     Chief Complaint      Chief Complaint   Patient presents with    ADHD     History of Present Illness      Hayley Montana is a 52 y.o. female who presents today for chief complaint Follow up adhd .     HPI   Mood is good on wellbutrin, says she needs vyvanse refilled  Still complaining of memory defecits, misplacing dates and tasks/events, getting lostin tasks at work and figure things out, compregension  Will take notes and discuss neuropsych tetsing but no   Active Problem List with Overview Notes    Diagnosis Date Noted    Forgetfulness 09/21/2021    Encounter for screening mammogram for malignant neoplasm of breast 09/21/2021    Back pain 09/21/2021    Skin lesion 09/21/2021    Venous insufficiency 04/26/2021    Low grade squamous intraepithelial lesion (LGSIL) on cervical Pap smear 01/07/2020     - 10/2019 PAP by Dr. Lance  - Dr. Lance following      Impaired fasting glucose 01/07/2020     History of herpes labialis 2019    Irritable bowel syndrome with diarrhea 2019    Attention deficit hyperactivity disorder (ADHD), combined type 2019    Anxiety 2019       Health Maintenance   Topic Date Due    Mammogram  11/10/2023    Lipid Panel  2026    TETANUS VACCINE  2029    Hepatitis C Screening  Completed       Past Medical History:   Diagnosis Date    ADHD (attention deficit hyperactivity disorder)     Anxiety     Complete rotator cuff tear or rupture of right shoulder, not specified as traumatic 2018    Depression     General anesthetics causing adverse effect in therapeutic use     delayed recovery       Past Surgical History:   Procedure Laterality Date     SECTION      x2    COLONOSCOPY  2018    Colonoscopy Dr. Wolf Marsh-- Recomended colonoscopy in 10 years.     COSMETIC SURGERY      breast lift tummy tuck    HYSTERECTOMY      Indication: Pain    SHOULDER ARTHROSCOPY      TONSILLECTOMY      TOTAL REDUCTION MAMMOPLASTY Bilateral 2006       family history includes ADD / ADHD in her son; Asthma in her son; Dementia in her mother; Diabetes in her brother and father; Hernia in her sister; Hypertension in her mother; Neuropathy in her mother; No Known Problems in her daughter and sister; Pancreatic cancer in her father; Scleroderma in her sister; Thyroid disease in her sister.    Social History     Tobacco Use    Smoking status: Never    Smokeless tobacco: Never   Substance Use Topics    Alcohol use: Yes     Comment: social    Drug use: No       Review of Systems   HENT:  Negative for hearing loss.    Eyes:  Negative for discharge.   Respiratory:  Negative for wheezing.    Cardiovascular:  Negative for chest pain and palpitations.   Gastrointestinal:  Negative for blood in stool, constipation, diarrhea and vomiting.   Genitourinary:  Positive for dysuria. Negative for hematuria.   Musculoskeletal:  Negative for neck pain.   Neurological:   Negative for weakness and headaches.   Endo/Heme/Allergies:  Negative for polydipsia.      Outpatient Encounter Medications as of 8/19/2022   Medication Sig Dispense Refill    buPROPion (WELLBUTRIN XL) 150 MG TB24 tablet Take 2 tablets (300 mg total) by mouth once daily. 180 tablet 1    lisdexamfetamine (VYVANSE) 40 MG Cap Take 1 capsule (40 mg total) by mouth once daily. 30 capsule 0    [DISCONTINUED] buPROPion (WELLBUTRIN XL) 150 MG TB24 tablet Take 2 tablets (300 mg total) by mouth once daily. 180 tablet 1    [DISCONTINUED] furosemide (LASIX) 20 MG tablet Take 1 tablet (20 mg total) by mouth daily as needed (lower extremity edema). 90 tablet 1    [DISCONTINUED] lisdexamfetamine (VYVANSE) 40 MG Cap Take 1 capsule (40 mg total) by mouth once daily. 30 capsule 0     No facility-administered encounter medications on file as of 8/19/2022.        Review of patient's allergies indicates:  No Known Allergies        Physical Exam       ]    Physical Exam  Constitutional:       General: She is not in acute distress.     Appearance: She is well-developed. She is not diaphoretic.   HENT:      Head: Normocephalic and atraumatic.      Right Ear: External ear normal.      Left Ear: External ear normal.      Nose: Nose normal.   Eyes:      General:         Right eye: No discharge.         Left eye: No discharge.      Conjunctiva/sclera: Conjunctivae normal.   Pulmonary:      Effort: Pulmonary effort is normal. No respiratory distress.   Musculoskeletal:         General: Normal range of motion.      Cervical back: Normal range of motion.   Skin:     Coloration: Skin is not pale.      Findings: No rash.   Neurological:      Mental Status: She is alert and oriented to person, place, and time.   Psychiatric:         Behavior: Behavior normal.         Thought Content: Thought content normal.        Laboratory:  CBC:  No results for input(s): WBC, RBC, HGB, HCT, PLT, MCV, MCH, MCHC in the last 2160 hours.  CMP:  No results for  input(s): GLU, CALCIUM, ALBUMIN, PROT, NA, K, CO2, CL, BUN, ALKPHOS, ALT, AST, BILITOT in the last 2160 hours.    Invalid input(s): CREATININ  URINALYSIS:  No results for input(s): COLORU, CLARITYU, SPECGRAV, PHUR, PROTEINUA, GLUCOSEU, BILIRUBINCON, BLOODU, WBCU, RBCU, BACTERIA, MUCUS, NITRITE, LEUKOCYTESUR, UROBILINOGEN, HYALINECASTS in the last 2160 hours.   LIPIDS:  No results for input(s): TSH, HDL, CHOL, TRIG, LDLCALC, CHOLHDL, NONHDLCHOL, TOTALCHOLEST in the last 2160 hours.  TSH:  No results for input(s): TSH in the last 2160 hours.  A1C:  No results for input(s): HGBA1C in the last 2160 hours.    Radiology:      Assessment/Plan     Hayley Montana is a 52 y.o.female with:    1. Attention deficit hyperactivity disorder (ADHD), combined type  Assessment & Plan:  Refill sent     Orders:  -     lisdexamfetamine (VYVANSE) 40 MG Cap    2. Anxiety  Assessment & Plan:  Doing well       3. Forgetfulness  Assessment & Plan:  Reassess and send fo rneuropsych tetsing       Other orders  -     buPROPion (WELLBUTRIN XL) 150 MG TB24 tablet        Use of the [facility] Patient Portal discussed and encouraged during today's visit  -Continue current medications and maintain follow up with specialists.  Return to clinic in .  No future appointments.    Christen Horton MD  8/29/2022 3:12 PM    Primary Care Internal Medicine

## 2022-08-28 ENCOUNTER — PATIENT MESSAGE (OUTPATIENT)
Dept: INTERNAL MEDICINE | Facility: CLINIC | Age: 52
End: 2022-08-28
Payer: COMMERCIAL

## 2022-08-30 ENCOUNTER — PATIENT MESSAGE (OUTPATIENT)
Dept: INTERNAL MEDICINE | Facility: CLINIC | Age: 52
End: 2022-08-30
Payer: COMMERCIAL

## 2022-08-30 DIAGNOSIS — Z77.120 MOLD EXPOSURE: Primary | ICD-10-CM

## 2022-08-31 ENCOUNTER — PATIENT MESSAGE (OUTPATIENT)
Dept: INTERNAL MEDICINE | Facility: CLINIC | Age: 52
End: 2022-08-31
Payer: COMMERCIAL

## 2022-09-02 ENCOUNTER — PATIENT MESSAGE (OUTPATIENT)
Dept: INTERNAL MEDICINE | Facility: CLINIC | Age: 52
End: 2022-09-02
Payer: COMMERCIAL

## 2022-09-07 ENCOUNTER — OFFICE VISIT (OUTPATIENT)
Dept: ALLERGY | Facility: CLINIC | Age: 52
End: 2022-09-07
Payer: COMMERCIAL

## 2022-09-07 VITALS
SYSTOLIC BLOOD PRESSURE: 139 MMHG | DIASTOLIC BLOOD PRESSURE: 71 MMHG | BODY MASS INDEX: 29.4 KG/M2 | OXYGEN SATURATION: 96 % | HEIGHT: 68 IN | HEART RATE: 74 BPM | WEIGHT: 194 LBS

## 2022-09-07 DIAGNOSIS — Z77.120 MOLD EXPOSURE: Primary | ICD-10-CM

## 2022-09-07 DIAGNOSIS — R41.3 POOR SHORT TERM MEMORY: ICD-10-CM

## 2022-09-07 PROCEDURE — 3078F DIAST BP <80 MM HG: CPT | Mod: CPTII,S$GLB,, | Performed by: STUDENT IN AN ORGANIZED HEALTH CARE EDUCATION/TRAINING PROGRAM

## 2022-09-07 PROCEDURE — 99999 PR PBB SHADOW E&M-EST. PATIENT-LVL IV: ICD-10-PCS | Mod: PBBFAC,,, | Performed by: STUDENT IN AN ORGANIZED HEALTH CARE EDUCATION/TRAINING PROGRAM

## 2022-09-07 PROCEDURE — 1160F PR REVIEW ALL MEDS BY PRESCRIBER/CLIN PHARMACIST DOCUMENTED: ICD-10-PCS | Mod: CPTII,S$GLB,, | Performed by: STUDENT IN AN ORGANIZED HEALTH CARE EDUCATION/TRAINING PROGRAM

## 2022-09-07 PROCEDURE — 99244 PR OFFICE CONSULTATION,LEVEL IV: ICD-10-PCS | Mod: S$GLB,,, | Performed by: STUDENT IN AN ORGANIZED HEALTH CARE EDUCATION/TRAINING PROGRAM

## 2022-09-07 PROCEDURE — 99999 PR PBB SHADOW E&M-EST. PATIENT-LVL IV: CPT | Mod: PBBFAC,,, | Performed by: STUDENT IN AN ORGANIZED HEALTH CARE EDUCATION/TRAINING PROGRAM

## 2022-09-07 PROCEDURE — 3008F PR BODY MASS INDEX (BMI) DOCUMENTED: ICD-10-PCS | Mod: CPTII,S$GLB,, | Performed by: STUDENT IN AN ORGANIZED HEALTH CARE EDUCATION/TRAINING PROGRAM

## 2022-09-07 PROCEDURE — 3078F PR MOST RECENT DIASTOLIC BLOOD PRESSURE < 80 MM HG: ICD-10-PCS | Mod: CPTII,S$GLB,, | Performed by: STUDENT IN AN ORGANIZED HEALTH CARE EDUCATION/TRAINING PROGRAM

## 2022-09-07 PROCEDURE — 3008F BODY MASS INDEX DOCD: CPT | Mod: CPTII,S$GLB,, | Performed by: STUDENT IN AN ORGANIZED HEALTH CARE EDUCATION/TRAINING PROGRAM

## 2022-09-07 PROCEDURE — 1159F MED LIST DOCD IN RCRD: CPT | Mod: CPTII,S$GLB,, | Performed by: STUDENT IN AN ORGANIZED HEALTH CARE EDUCATION/TRAINING PROGRAM

## 2022-09-07 PROCEDURE — 1159F PR MEDICATION LIST DOCUMENTED IN MEDICAL RECORD: ICD-10-PCS | Mod: CPTII,S$GLB,, | Performed by: STUDENT IN AN ORGANIZED HEALTH CARE EDUCATION/TRAINING PROGRAM

## 2022-09-07 PROCEDURE — 1160F RVW MEDS BY RX/DR IN RCRD: CPT | Mod: CPTII,S$GLB,, | Performed by: STUDENT IN AN ORGANIZED HEALTH CARE EDUCATION/TRAINING PROGRAM

## 2022-09-07 PROCEDURE — 3075F PR MOST RECENT SYSTOLIC BLOOD PRESS GE 130-139MM HG: ICD-10-PCS | Mod: CPTII,S$GLB,, | Performed by: STUDENT IN AN ORGANIZED HEALTH CARE EDUCATION/TRAINING PROGRAM

## 2022-09-07 PROCEDURE — 3075F SYST BP GE 130 - 139MM HG: CPT | Mod: CPTII,S$GLB,, | Performed by: STUDENT IN AN ORGANIZED HEALTH CARE EDUCATION/TRAINING PROGRAM

## 2022-09-07 PROCEDURE — 99244 OFF/OP CNSLTJ NEW/EST MOD 40: CPT | Mod: S$GLB,,, | Performed by: STUDENT IN AN ORGANIZED HEALTH CARE EDUCATION/TRAINING PROGRAM

## 2022-09-07 NOTE — PROGRESS NOTES
Allergy Clinic Note  [facility]    This note was created by combination of typed  and M-Modal dictation. Transcription errors may be present.  If there are any questions, please contact me.    Subjective:      Patient ID: Hayley Montana is a 52 y.o. female.    Chief Complaint: Other (Mold exposure )      Referring Provider: Christen Horton    History of Present Illness: Hayley Montana is a 52 y.o. female    Related medications and other interventions    9/7/2022:  At initial visit complaining of memory problems for the last several months.  She is concerned that this is due to copious mold that has been found in her condo.  She is currently not living in a moldy area.  She denies any upper or lower respiratory symptoms.  She also denies any other cognitive symptoms.            MEDICAL HISTORY      Significant past medical history:  ADHD, anxiety, depression  ENT surgery:  Tonsillectomy   Significant family history: son with EIB, no rhinitis  Exposures: 1 cat (in bed), mold as above (and temporarily environmental smoke)  Smoking Hx:  Client  reports that she has never smoked. She has never used smokeless tobacco.      Asthma: No  Eczema: No  Food allergy:  No  Drug allergy/intolerance: No  Venom allergy: denies  Latex allergy:  denies    Patient Active Problem List   Diagnosis    Attention deficit hyperactivity disorder (ADHD), combined type    Anxiety    History of herpes labialis    Irritable bowel syndrome with diarrhea    Low grade squamous intraepithelial lesion (LGSIL) on cervical Pap smear    Impaired fasting glucose    Venous insufficiency    Forgetfulness    Encounter for screening mammogram for malignant neoplasm of breast    Back pain    Skin lesion     Medication List with Changes/Refills   Current Medications    BUPROPION (WELLBUTRIN XL) 150 MG TB24 TABLET    Take 2 tablets (300 mg total) by mouth once daily.       Start Date: 8/19/2022 End Date: 8/19/2023    LISDEXAMFETAMINE  "(VYVANSE) 40 MG CAP    Take 1 capsule (40 mg total) by mouth once daily.       Start Date: 8/19/2022 End Date: --           REVIEW OF SYSTEMS      CONST: no F/C/NS, no unintentional weight changes  NEURO:  no tremor, no weakness  EYES: no discharge, no pruritus, no erythema  EARS: no hearing loss, no sensation of fullness  NOSE: no congestion, no rhinorrhea, no sneezing  PULM:  no SOB, no wheezing, no cough  CV: no CP, no palpitations, no leg swelling  GI:  no abdominal pain, no blood in stool  :  no dysuria, no hematuria  DERM: no rashes, no skin breaks    Objective:     Physical Exam:     /71   Pulse 74   Ht 5' 8" (1.727 m)   Wt 88 kg (194 lb 0.1 oz)   LMP 11/07/2016   SpO2 96%   BMI 29.50 kg/m²   GEN: Awake and alert, no distress  DERM: No rashes or flushing  EYE:  No occular discharge  HEENT: No nasal discharge, no hoarseness  PULM: Normal work of breathing, no cough  NEURO:  No focal deficit, speech fluent and logical  PSYCH: appropriate affect, normal behavior        Allergy Testing            Lab results            Imaging and other diagnostics            Medical records review          ASSESSMENT & PLAN       Diagnoses:     Hayley Montana is a 52 y.o. female. with  1. Mold exposure    2. Poor short term memory          Medical Decision making:   Pt is presenting with problems with short term memory which could be related to lack of attention or concentration.  With no other symptoms, I do not suspect the mold exposure is causal but understand the need to rule it out.  Plan IgG (exposure) testing to Aspergillus, Penicillium and Alternaria.  Asked her to find name of specific mold if possible.      Mold exposure  -     Ambulatory referral/consult to Allergy  -     Misc Sendout Test, Blood Aspergillus IgG (not IgE); Future; Expected date: 09/07/2022  -     Misc Sendout Test, Blood Alternaria IgG (not IgE).; Future; Expected date: 09/07/2022  -     Misc Sendout Test, Blood Penicillium IgG (not IgE); " Future; Expected date: 09/07/2022    Poor short term memory        Patient Instructions:     Patient Instructions   Testing  Blood work for mold exposure -- we will call with arrangement       [facility] or call [phone]       Contact me with questions or concerns       I will contact you if anything needs immediate attention.    Recommend continuing to look for other causes.    Treatment    Stay away from your condo until all moldy material has been removed.    Follow up in about 2 weeks (around 9/21/2022) for F/U labs by text.        Brittany Francis MD  Allergy, Asthma and Immunology

## 2022-09-07 NOTE — PATIENT INSTRUCTIONS
Testing  Blood work for mold exposure -- we will call with arrangement       Check [facility] in one week for results or call [phone]       Contact me with questions or concerns       I will contact you if anything needs immediate attention.    Recommend continuing to look for other causes.    Treatment    Stay away from your condo until all moldy material has been removed.

## 2022-09-08 ENCOUNTER — TELEPHONE (OUTPATIENT)
Dept: ALLERGY | Facility: CLINIC | Age: 52
End: 2022-09-08
Payer: COMMERCIAL

## 2022-09-09 ENCOUNTER — PATIENT MESSAGE (OUTPATIENT)
Dept: ALLERGY | Facility: CLINIC | Age: 52
End: 2022-09-09
Payer: COMMERCIAL

## 2022-09-21 ENCOUNTER — TELEPHONE (OUTPATIENT)
Dept: ALLERGY | Facility: CLINIC | Age: 52
End: 2022-09-21
Payer: COMMERCIAL

## 2022-09-21 NOTE — TELEPHONE ENCOUNTER
----- Message from Priti Celeste sent at 9/21/2022  1:06 PM CDT -----  Contact: pt   Pt is requesting a call  back regarding results please call to discuss further .

## 2022-09-22 ENCOUNTER — PATIENT MESSAGE (OUTPATIENT)
Dept: PALLIATIVE MEDICINE | Facility: CLINIC | Age: 52
End: 2022-09-22
Payer: COMMERCIAL

## 2022-09-23 ENCOUNTER — TELEPHONE (OUTPATIENT)
Dept: ALLERGY | Facility: CLINIC | Age: 52
End: 2022-09-23
Payer: COMMERCIAL

## 2022-09-23 NOTE — TELEPHONE ENCOUNTER
Spoke with patient . Patient notified will contact [facility] for lab results.   Called [facility] will fax over patient labs results

## 2022-09-26 ENCOUNTER — OFFICE VISIT (OUTPATIENT)
Dept: OPTOMETRY | Facility: CLINIC | Age: 52
End: 2022-09-26
Payer: COMMERCIAL

## 2022-09-26 ENCOUNTER — TELEPHONE (OUTPATIENT)
Dept: ALLERGY | Facility: CLINIC | Age: 52
End: 2022-09-26
Payer: COMMERCIAL

## 2022-09-26 DIAGNOSIS — H52.4 MYOPIA WITH PRESBYOPIA OF BOTH EYES: Primary | ICD-10-CM

## 2022-09-26 DIAGNOSIS — S05.91XS OCULAR TRAUMA OF RIGHT EYE, SEQUELA: ICD-10-CM

## 2022-09-26 DIAGNOSIS — H52.13 MYOPIA WITH PRESBYOPIA OF BOTH EYES: Primary | ICD-10-CM

## 2022-09-26 PROCEDURE — 99999 PR PBB SHADOW E&M-EST. PATIENT-LVL II: CPT | Mod: PBBFAC,,, | Performed by: OPTOMETRIST

## 2022-09-26 PROCEDURE — 92004 PR EYE EXAM, NEW PATIENT,COMPREHESV: ICD-10-PCS | Mod: S$GLB,,, | Performed by: OPTOMETRIST

## 2022-09-26 PROCEDURE — 92004 COMPRE OPH EXAM NEW PT 1/>: CPT | Mod: S$GLB,,, | Performed by: OPTOMETRIST

## 2022-09-26 PROCEDURE — 1160F RVW MEDS BY RX/DR IN RCRD: CPT | Mod: CPTII,S$GLB,, | Performed by: OPTOMETRIST

## 2022-09-26 PROCEDURE — 1159F PR MEDICATION LIST DOCUMENTED IN MEDICAL RECORD: ICD-10-PCS | Mod: CPTII,S$GLB,, | Performed by: OPTOMETRIST

## 2022-09-26 PROCEDURE — 99999 PR PBB SHADOW E&M-EST. PATIENT-LVL II: ICD-10-PCS | Mod: PBBFAC,,, | Performed by: OPTOMETRIST

## 2022-09-26 PROCEDURE — 92015 DETERMINE REFRACTIVE STATE: CPT | Mod: S$GLB,,, | Performed by: OPTOMETRIST

## 2022-09-26 PROCEDURE — 1160F PR REVIEW ALL MEDS BY PRESCRIBER/CLIN PHARMACIST DOCUMENTED: ICD-10-PCS | Mod: CPTII,S$GLB,, | Performed by: OPTOMETRIST

## 2022-09-26 PROCEDURE — 1159F MED LIST DOCD IN RCRD: CPT | Mod: CPTII,S$GLB,, | Performed by: OPTOMETRIST

## 2022-09-26 PROCEDURE — 92015 PR REFRACTION: ICD-10-PCS | Mod: S$GLB,,, | Performed by: OPTOMETRIST

## 2022-09-26 NOTE — TELEPHONE ENCOUNTER
----- Message from Pilar Acosta sent at 9/26/2022  4:36 PM CDT -----  Contact: pt  Pt calling again about her test results , please call     Confirmed patient's contact info below:  Contact Name: Hayley Montana  Phone Number: [phone]

## 2022-09-26 NOTE — PROGRESS NOTES
HPI    DLS: 6/3/2019  dr. Wills ([place])  Hx: hit in OD with softball  age 12-spent time inpatient @ hospital w/ eye   patched -doesn't recall sx but did see retina specialist    PT STATES: overdue exam. [facility]. Interested in being   without glasses or maybe contacts.  Been >2 yrs so felt needed exam.  More   dependent on glasses for near.  Wears distance glasses for night driving.    Has PAL for distance and separate pair of computer glasses.      -f/f  -eye pain  -drops       Last edited by Azeb Erazo on 9/26/2022  3:28 PM.            Assessment /Plan     For exam results, see Encounter Report.    Myopia with presbyopia of both eyes    Ocular trauma of right eye, sequela      Spectacle Rx given, discussed different options for glasses. RTC 1 year routine eye exam.   2. Shallow angle right, normal iop, continue with yearly eye exams and iop cks.          Addend 8/2/23 adjusted bifocal add down per pt request

## 2022-09-26 NOTE — TELEPHONE ENCOUNTER
Pt stated she a has 9:30 am appt with  on wed 9/28/22. Pt notified results are pending provider review. Verbalized understanding.

## 2022-09-28 ENCOUNTER — PATIENT MESSAGE (OUTPATIENT)
Dept: ALLERGY | Facility: CLINIC | Age: 52
End: 2022-09-28
Payer: COMMERCIAL

## 2022-09-29 ENCOUNTER — TELEPHONE (OUTPATIENT)
Dept: INTERNAL MEDICINE | Facility: CLINIC | Age: 52
End: 2022-09-29

## 2022-09-29 ENCOUNTER — HOSPITAL ENCOUNTER (OUTPATIENT)
Dept: RADIOLOGY | Facility: HOSPITAL | Age: 52
Discharge: HOME | End: 2022-09-29
Attending: INTERNAL MEDICINE
Payer: COMMERCIAL

## 2022-09-29 ENCOUNTER — PATIENT MESSAGE (OUTPATIENT)
Dept: ALLERGY | Facility: CLINIC | Age: 52
End: 2022-09-29
Payer: COMMERCIAL

## 2022-09-29 ENCOUNTER — OFFICE VISIT (OUTPATIENT)
Dept: INTERNAL MEDICINE | Facility: CLINIC | Age: 52
End: 2022-09-29
Payer: COMMERCIAL

## 2022-09-29 ENCOUNTER — TELEPHONE (OUTPATIENT)
Dept: ALLERGY | Facility: CLINIC | Age: 52
End: 2022-09-29
Payer: COMMERCIAL

## 2022-09-29 ENCOUNTER — PATIENT MESSAGE (OUTPATIENT)
Dept: INTERNAL MEDICINE | Facility: CLINIC | Age: 52
End: 2022-09-29

## 2022-09-29 DIAGNOSIS — R41.89 COGNITIVE CHANGE: ICD-10-CM

## 2022-09-29 DIAGNOSIS — R68.89 FORGETFULNESS: ICD-10-CM

## 2022-09-29 DIAGNOSIS — R68.89 FORGETFULNESS: Primary | ICD-10-CM

## 2022-09-29 DIAGNOSIS — R41.3 OTHER AMNESIA: ICD-10-CM

## 2022-09-29 PROCEDURE — 70551 MRI BRAIN STEM W/O DYE: CPT | Mod: TC

## 2022-09-29 PROCEDURE — 70551 MRI BRAIN WITHOUT CONTRAST: ICD-10-PCS | Mod: 26,,, | Performed by: RADIOLOGY

## 2022-09-29 PROCEDURE — 99214 PR OFFICE/OUTPT VISIT, EST, LEVL IV, 30-39 MIN: ICD-10-PCS | Mod: 95,,, | Performed by: INTERNAL MEDICINE

## 2022-09-29 PROCEDURE — 99214 OFFICE O/P EST MOD 30 MIN: CPT | Mod: 95,,, | Performed by: INTERNAL MEDICINE

## 2022-09-29 PROCEDURE — 70551 MRI BRAIN STEM W/O DYE: CPT | Mod: 26,,, | Performed by: RADIOLOGY

## 2022-09-29 NOTE — TELEPHONE ENCOUNTER
----- Message from Dirk Disla sent at 9/29/2022 10:04 AM CDT -----  Contact: pt  Pt requesting callback RE: would like results to put on the portal so that she can view them, also she would like to the plan of treatment , please call to discuss      Confirmed contact below:  Contact Name:Hayley Montana  Phone Number: [phone]

## 2022-09-29 NOTE — TELEPHONE ENCOUNTER
Patients labs we done at [facility] therefore we have no access to them. Patient may have to contact [facility] for them. Or we may fax them to you if you have a fax number.

## 2022-09-29 NOTE — PATIENT INSTRUCTIONS
[email or web address]      [email or web address] -- this is your mom's neurology practice       Referral to neurology via [facility]     [facility]

## 2022-09-29 NOTE — PROGRESS NOTES
[facility] Note  The patient location is: [place]  The chief complaint leading to consultation is: memory complaints     Visit type: audiovisual    Face to Face time with patient: 15  15 minutes of total time spent on the encounter, which includes face to face time and non-face to face time preparing to see the patient (eg, review of tests), Obtaining and/or reviewing separately obtained history, Documenting clinical information in the electronic or other health record, Independently interpreting results (not separately reported) and communicating results to the patient/family/caregiver, or Care coordination (not separately reported).         Each patient to whom he or she provides medical services by telemedicine is:  (1) informed of the relationship between the physician and patient and the respective role of any other health care provider with respect to management of the patient; and (2) notified that he or she may decline to receive medical services by telemedicine and may withdraw from such care at any time.    Notes:     Chief Complaint      Chief Complaint   Patient presents with    Memory Loss       History of Present Illness      Hayley Montana is a 52 y.o. female who presents today for chief complaint memory changes . Patient previously seen by me    PCP: Christen Horton MD  Patient comes to appointment alone.     HPI   Was working as an  for an insurance company,was making mistakes and was asked to resign     Did see AI for mold exposure had Aspergillus and there mold levels checked at [facility]   Feels she has having word finding and cognitive changes  Is struggling emotionally as a result of all of this   Has had a life stress which may be the cause, but memory concerns are not a new complaint     Sees derm also has isolated cliveea - Vi, unsure who she saw   Active Problem List with Overview Notes    Diagnosis Date Noted    Forgetfulness 09/21/2021    Encounter for  screening mammogram for malignant neoplasm of breast 2021    Back pain 2021    Skin lesion 2021    Venous insufficiency 2021    Low grade squamous intraepithelial lesion (LGSIL) on cervical Pap smear 2020     - 10/2019 PAP by Dr. Lance  - Dr. Lance following      Impaired fasting glucose 2020    History of herpes labialis 2019    Irritable bowel syndrome with diarrhea 2019    Attention deficit hyperactivity disorder (ADHD), combined type 2019    Anxiety 2019       Health Maintenance   Topic Date Due    Mammogram  11/10/2023    Lipid Panel  2026    TETANUS VACCINE  2029    Hepatitis C Screening  Completed       Past Medical History:   Diagnosis Date    ADHD (attention deficit hyperactivity disorder)     Anxiety     Complete rotator cuff tear or rupture of right shoulder, not specified as traumatic 2018    Depression     General anesthetics causing adverse effect in therapeutic use     delayed recovery       Past Surgical History:   Procedure Laterality Date     SECTION      x2    COLONOSCOPY  2018    Colonoscopy Dr. Wolf Marsh-- Recomended colonoscopy in 10 years.     COSMETIC SURGERY      breast lift tummy tuck    HYSTERECTOMY      Indication: Pain    SHOULDER ARTHROSCOPY      TONSILLECTOMY      TOTAL REDUCTION MAMMOPLASTY Bilateral 2006       family history includes ADD / ADHD in her son; Asthma in her son; Dementia in her mother; Diabetes in her brother and father; Hernia in her sister; Hypertension in her mother; Neuropathy in her mother; No Known Problems in her daughter and sister; Pancreatic cancer in her father; Scleroderma in her sister; Thyroid disease in her sister.    Social History     Tobacco Use    Smoking status: Never    Smokeless tobacco: Never   Substance Use Topics    Alcohol use: Yes     Comment: social    Drug use: No       Review of Systems   HENT:  Negative for hearing loss.    Eyes:  Negative  for discharge.   Respiratory:  Negative for wheezing.    Cardiovascular:  Negative for chest pain and palpitations.   Gastrointestinal:  Negative for blood in stool, constipation, diarrhea and vomiting.   Genitourinary:  Negative for dysuria and hematuria.   Musculoskeletal:  Negative for neck pain.   Neurological:  Negative for weakness and headaches.   Endo/Heme/Allergies:  Negative for polydipsia.   Psychiatric/Behavioral:  Positive for memory loss. The patient is nervous/anxious.       Outpatient Encounter Medications as of 9/29/2022   Medication Sig Dispense Refill    buPROPion (WELLBUTRIN XL) 150 MG TB24 tablet Take 2 tablets (300 mg total) by mouth once daily. 180 tablet 1    lisdexamfetamine (VYVANSE) 40 MG Cap Take 1 capsule (40 mg total) by mouth once daily. 30 capsule 0     No facility-administered encounter medications on file as of 9/29/2022.        Review of patient's allergies indicates:  No Known Allergies        Physical Exam       ]    Physical Exam  Constitutional:       General: She is not in acute distress.     Appearance: She is well-developed. She is not diaphoretic.   HENT:      Head: Normocephalic and atraumatic.      Right Ear: External ear normal.      Left Ear: External ear normal.      Nose: Nose normal.   Eyes:      General:         Right eye: No discharge.         Left eye: No discharge.      Conjunctiva/sclera: Conjunctivae normal.   Pulmonary:      Effort: Pulmonary effort is normal. No respiratory distress.   Musculoskeletal:         General: Normal range of motion.      Cervical back: Normal range of motion.   Skin:     Coloration: Skin is not pale.      Findings: No rash.   Neurological:      Mental Status: She is alert and oriented to person, place, and time.   Psychiatric:         Behavior: Behavior normal.         Thought Content: Thought content normal.        Laboratory:  CBC:  No results for input(s): WBC, RBC, HGB, HCT, PLT, MCV, MCH, MCHC in the last 2160 hours.  CMP:  No  results for input(s): GLU, CALCIUM, ALBUMIN, PROT, NA, K, CO2, CL, BUN, ALKPHOS, ALT, AST, BILITOT in the last 2160 hours.    Invalid input(s): CREATININ  URINALYSIS:  No results for input(s): COLORU, CLARITYU, SPECGRAV, PHUR, PROTEINUA, GLUCOSEU, BILIRUBINCON, BLOODU, WBCU, RBCU, BACTERIA, MUCUS, NITRITE, LEUKOCYTESUR, UROBILINOGEN, HYALINECASTS in the last 2160 hours.   LIPIDS:  No results for input(s): TSH, HDL, CHOL, TRIG, LDLCALC, CHOLHDL, NONHDLCHOL, TOTALCHOLEST in the last 2160 hours.  TSH:  No results for input(s): TSH in the last 2160 hours.  A1C:  No results for input(s): HGBA1C in the last 2160 hours.    Radiology:      Assessment/Plan     Hayley Montana is a 52 y.o.female with:    1. Forgetfulness  -     RPR  -     TSH  -     CBC Without Differential  -     Comprehensive Metabolic Panel  -     VITAMIN B12  -     FOLATE  -     Ambulatory referral/consult to Adult Neuropsychology  -     Neuropsychological testing  -     MRI Brain Without Contrast    2. Cognitive change  -     RPR  -     TSH  -     CBC Without Differential  -     Comprehensive Metabolic Panel  -     VITAMIN B12  -     FOLATE  -     Ambulatory referral/consult to Adult Neuropsychology  -     Neuropsychological testing  -     MRI Brain Without Contrast    3. Other amnesia  -     MRI Brain Without Contrast        Use of the [facility] Patient Portal discussed and encouraged during today's visit  -Continue current medications and maintain follow up with specialists.  Return to clinic in prn.  Future Appointments   Date Time Provider Department Center   9/29/2022  3:30 PM [facility] MRI1 500 LB LIMIT [facility] MRI [facility]   9/29/2022  4:00 PM [facility] CLINLAB [facility]   10/3/2022 11:30 AM Juma Dawn MD [facility] ENT Tasha Horton MD  9/29/2022 1:03 PM    Primary Care Internal Medicine

## 2022-09-29 NOTE — TELEPHONE ENCOUNTER
----- Message from Guerrero Fernando sent at 9/29/2022  2:48 PM CDT -----  Contact: self  [phone]  Patient requesting a call from the office in regards to getting an PA on MRI Today  insurance phone [phone] fax order [phone] go on file [employer] answer question give her member ID# [id number]. Please call and advise, THANKS

## 2022-09-30 ENCOUNTER — PATIENT MESSAGE (OUTPATIENT)
Dept: INTERNAL MEDICINE | Facility: CLINIC | Age: 52
End: 2022-09-30
Payer: COMMERCIAL

## 2022-10-03 ENCOUNTER — PATIENT MESSAGE (OUTPATIENT)
Dept: ALLERGY | Facility: CLINIC | Age: 52
End: 2022-10-03
Payer: COMMERCIAL

## 2022-10-04 ENCOUNTER — PATIENT MESSAGE (OUTPATIENT)
Dept: INTERNAL MEDICINE | Facility: CLINIC | Age: 52
End: 2022-10-04
Payer: COMMERCIAL

## 2022-11-17 ENCOUNTER — OFFICE VISIT (OUTPATIENT)
Dept: OTOLARYNGOLOGY | Facility: CLINIC | Age: 52
End: 2022-11-17
Payer: COMMERCIAL

## 2022-11-17 VITALS — WEIGHT: 200.38 LBS | BODY MASS INDEX: 30.37 KG/M2 | HEIGHT: 68 IN

## 2022-11-17 DIAGNOSIS — H69.92 ETD (EUSTACHIAN TUBE DYSFUNCTION), LEFT: Primary | ICD-10-CM

## 2022-11-17 DIAGNOSIS — H74.92 MASTOID DISORDER, LEFT: ICD-10-CM

## 2022-11-17 PROCEDURE — 1159F PR MEDICATION LIST DOCUMENTED IN MEDICAL RECORD: ICD-10-PCS | Mod: CPTII,S$GLB,, | Performed by: STUDENT IN AN ORGANIZED HEALTH CARE EDUCATION/TRAINING PROGRAM

## 2022-11-17 PROCEDURE — 3008F PR BODY MASS INDEX (BMI) DOCUMENTED: ICD-10-PCS | Mod: CPTII,S$GLB,, | Performed by: STUDENT IN AN ORGANIZED HEALTH CARE EDUCATION/TRAINING PROGRAM

## 2022-11-17 PROCEDURE — 99214 PR OFFICE/OUTPT VISIT, EST, LEVL IV, 30-39 MIN: ICD-10-PCS | Mod: S$GLB,,, | Performed by: STUDENT IN AN ORGANIZED HEALTH CARE EDUCATION/TRAINING PROGRAM

## 2022-11-17 PROCEDURE — 3008F BODY MASS INDEX DOCD: CPT | Mod: CPTII,S$GLB,, | Performed by: STUDENT IN AN ORGANIZED HEALTH CARE EDUCATION/TRAINING PROGRAM

## 2022-11-17 PROCEDURE — 1159F MED LIST DOCD IN RCRD: CPT | Mod: CPTII,S$GLB,, | Performed by: STUDENT IN AN ORGANIZED HEALTH CARE EDUCATION/TRAINING PROGRAM

## 2022-11-17 PROCEDURE — 99214 OFFICE O/P EST MOD 30 MIN: CPT | Mod: S$GLB,,, | Performed by: STUDENT IN AN ORGANIZED HEALTH CARE EDUCATION/TRAINING PROGRAM

## 2022-11-17 PROCEDURE — 99999 PR PBB SHADOW E&M-EST. PATIENT-LVL II: ICD-10-PCS | Mod: PBBFAC,,, | Performed by: STUDENT IN AN ORGANIZED HEALTH CARE EDUCATION/TRAINING PROGRAM

## 2022-11-17 PROCEDURE — 99999 PR PBB SHADOW E&M-EST. PATIENT-LVL II: CPT | Mod: PBBFAC,,, | Performed by: STUDENT IN AN ORGANIZED HEALTH CARE EDUCATION/TRAINING PROGRAM

## 2022-11-17 RX ORDER — PREDNISONE 20 MG/1
20 TABLET ORAL DAILY
Qty: 5 TABLET | Refills: 0 | Status: SHIPPED | OUTPATIENT
Start: 2022-11-17 | End: 2022-11-22

## 2022-11-17 RX ORDER — FLUTICASONE PROPIONATE 50 MCG
1 SPRAY, SUSPENSION (ML) NASAL 2 TIMES DAILY
Qty: 16 G | Refills: 3 | Status: SHIPPED | OUTPATIENT
Start: 2022-11-17 | End: 2023-02-15

## 2022-11-17 NOTE — PROGRESS NOTES
Otolaryngology Clinic Note    Subjective:       Patient ID: Hayley Montana is a 52 y.o. female.    Chief Complaint: Otalgia (Left ear)      History of Present Illness: Hayley Montana is a 52 y.o. female presenting with left ear infection since end of Sept. Comes and goes. Not tremendous pain, pressure on entire side of her face. Never had before. No allergy sinus issues. Was given amoxil by er . No other medications. Feels clear but still with pressure. Hearing is symmetric. She has always had on and off sharp pain, no tinnitus. Feels like there is drainage in the ear. No illness prior.       Past Surgical History:   Procedure Laterality Date     SECTION      x2    COLONOSCOPY  2018    Colonoscopy Dr. Wolf Marsh-- Recomended colonoscopy in 10 years.     COSMETIC SURGERY      breast lift tummy tuck    HYSTERECTOMY      Indication: Pain    SHOULDER ARTHROSCOPY      TONSILLECTOMY      TOTAL REDUCTION MAMMOPLASTY Bilateral      Past Medical History:   Diagnosis Date    ADHD (attention deficit hyperactivity disorder)     Anxiety     Complete rotator cuff tear or rupture of right shoulder, not specified as traumatic 2018    Depression     General anesthetics causing adverse effect in therapeutic use     delayed recovery     Social Determinants of Health     Tobacco Use: Low Risk     Smoking Tobacco Use: Never    Smokeless Tobacco Use: Never    Passive Exposure: Not on file   Alcohol Use: Not on file   Financial Resource Strain: Not on file   Food Insecurity: Not on file   Transportation Needs: Not on file   Physical Activity: Not on file   Stress: Not on file   Social Connections: Not on file   Housing Stability: Not on file   Depression: Not on file     Review of patient's allergies indicates:  No Known Allergies  Current Outpatient Medications   Medication Instructions    buPROPion (WELLBUTRIN XL) 300 mg, Oral, Daily    lisdexamfetamine (VYVANSE) 40 mg, Oral, Daily       ENT ROS negative  except as stated above.             Objective:      There were no vitals filed for this visit.    General: NAD, well appearing  Eyes: Normal conjunctiva and lids  Face: symmetric, nerve intact  Nose: The nose is without any evidence of any deformity. The nasal mucosa is moist. The septum deviates left with spur on floor bl. There is no evidence of septal hematoma. The turbinates are without abnormality.   Ears: The ears are with normal-appearing pinna. Examination of the canals is normal appearing bilaterally. There is no drainage or erythema noted. The tympanic membranes are normal appearing with pearly color, normal-appearing landmarks and normal light reflex. Hearing is grossly intact. [place] midline, Rinne normal on left.   Mouth: No obvious abnormalities to the lips. The teeth are unremarkable. The gingivae are without any obvious evidence of infection or lesion. The oral mucosa is moist and pink. There are no obvious masses to the hard or soft palate.   Oropharynx: The uvula is midline.  The tongue is midline. The posterior pharynx is without erythema or exudate. The tonsils are normal appearing.  Salivary glands: The salivary glands are symmetric and not enlarged, no masses  Neck: No lymphadenopathy, trachea midline, thryoid not enlarged.  Psych: Normal mood and affect.   Neuro: Grossly intact  Speech: fluent    Test Review: I personally reviewed 9/29 MRI with small bilateral mastoid effusions.      Assessment and Plan:       1. ETD (Eustachian tube dysfunction), left    2. Mastoid disorder, left          Flonase BID 6 weeks.   Pop ears 3 times daily  Steroids to shorten resolution, she would like to try  Sudafed for a few days      RTC: PRN    Plan of care was discussed in detail with the patient, who agreed with the plan as above. All questions were answered in detail.     Dea Tavarez MD  Otolaryngology

## 2022-11-29 ENCOUNTER — TELEPHONE (OUTPATIENT)
Dept: NEUROLOGY | Facility: CLINIC | Age: 52
End: 2022-11-29
Payer: COMMERCIAL

## 2022-11-29 NOTE — TELEPHONE ENCOUNTER
Patient informed she must come in to have Vyvanse prescribed. Patient states she will like to wait until outpatient labs come in first.    Placed patient on Cpap. Tidal volumes about 600  but rate only 7-8. Returned to previous settings.

## 2022-12-20 ENCOUNTER — PATIENT MESSAGE (OUTPATIENT)
Dept: INTERNAL MEDICINE | Facility: CLINIC | Age: 52
End: 2022-12-20
Payer: COMMERCIAL

## 2022-12-22 ENCOUNTER — PATIENT MESSAGE (OUTPATIENT)
Dept: ALLERGY | Facility: CLINIC | Age: 52
End: 2022-12-22
Payer: COMMERCIAL

## 2023-01-11 ENCOUNTER — PATIENT MESSAGE (OUTPATIENT)
Dept: ALLERGY | Facility: CLINIC | Age: 53
End: 2023-01-11
Payer: COMMERCIAL

## 2023-01-13 DIAGNOSIS — Z77.120 MOLD EXPOSURE: Primary | ICD-10-CM

## 2023-01-13 NOTE — PROGRESS NOTES
# mold exposure    Mold testing revealed Chaetomium.  Ordered Chaetomium IgG and IgE, though not yet sure if former is available.  Will discuss with send out lab.

## 2023-01-22 ENCOUNTER — PATIENT MESSAGE (OUTPATIENT)
Dept: ALLERGY | Facility: CLINIC | Age: 53
End: 2023-01-22
Payer: COMMERCIAL

## 2023-01-22 ENCOUNTER — PATIENT MESSAGE (OUTPATIENT)
Dept: INTERNAL MEDICINE | Facility: CLINIC | Age: 53
End: 2023-01-22
Payer: COMMERCIAL

## 2023-01-23 ENCOUNTER — PATIENT MESSAGE (OUTPATIENT)
Dept: GASTROENTEROLOGY | Facility: CLINIC | Age: 53
End: 2023-01-23

## 2023-01-23 ENCOUNTER — OFFICE VISIT (OUTPATIENT)
Dept: GASTROENTEROLOGY | Facility: CLINIC | Age: 53
End: 2023-01-23
Payer: COMMERCIAL

## 2023-01-23 ENCOUNTER — HOSPITAL ENCOUNTER (OUTPATIENT)
Dept: RADIOLOGY | Facility: HOSPITAL | Age: 53
Discharge: HOME | End: 2023-01-23
Attending: NURSE PRACTITIONER
Payer: COMMERCIAL

## 2023-01-23 VITALS — HEIGHT: 68 IN | TEMPERATURE: 98 F | WEIGHT: 200.63 LBS | BODY MASS INDEX: 30.41 KG/M2

## 2023-01-23 DIAGNOSIS — R10.32 LLQ PAIN: ICD-10-CM

## 2023-01-23 DIAGNOSIS — R10.30 LOWER ABDOMINAL PAIN: ICD-10-CM

## 2023-01-23 DIAGNOSIS — H92.09 OTALGIA, UNSPECIFIED LATERALITY: ICD-10-CM

## 2023-01-23 DIAGNOSIS — R10.30 LOWER ABDOMINAL PAIN: Primary | ICD-10-CM

## 2023-01-23 PROCEDURE — 1160F RVW MEDS BY RX/DR IN RCRD: CPT | Mod: CPTII,S$GLB,, | Performed by: NURSE PRACTITIONER

## 2023-01-23 PROCEDURE — 25500020 PHARM REV CODE 255: Performed by: NURSE PRACTITIONER

## 2023-01-23 PROCEDURE — 3008F PR BODY MASS INDEX (BMI) DOCUMENTED: ICD-10-PCS | Mod: CPTII,S$GLB,, | Performed by: NURSE PRACTITIONER

## 2023-01-23 PROCEDURE — 1160F PR REVIEW ALL MEDS BY PRESCRIBER/CLIN PHARMACIST DOCUMENTED: ICD-10-PCS | Mod: CPTII,S$GLB,, | Performed by: NURSE PRACTITIONER

## 2023-01-23 PROCEDURE — 3008F BODY MASS INDEX DOCD: CPT | Mod: CPTII,S$GLB,, | Performed by: NURSE PRACTITIONER

## 2023-01-23 PROCEDURE — 74177 CT ABD & PELVIS W/CONTRAST: CPT | Mod: TC

## 2023-01-23 PROCEDURE — 99999 PR PBB SHADOW E&M-EST. PATIENT-LVL IV: CPT | Mod: PBBFAC,,, | Performed by: NURSE PRACTITIONER

## 2023-01-23 PROCEDURE — 99203 OFFICE O/P NEW LOW 30 MIN: CPT | Mod: S$GLB,,, | Performed by: NURSE PRACTITIONER

## 2023-01-23 PROCEDURE — 1159F MED LIST DOCD IN RCRD: CPT | Mod: CPTII,S$GLB,, | Performed by: NURSE PRACTITIONER

## 2023-01-23 PROCEDURE — 1159F PR MEDICATION LIST DOCUMENTED IN MEDICAL RECORD: ICD-10-PCS | Mod: CPTII,S$GLB,, | Performed by: NURSE PRACTITIONER

## 2023-01-23 PROCEDURE — 99203 PR OFFICE/OUTPT VISIT, NEW, LEVL III, 30-44 MIN: ICD-10-PCS | Mod: S$GLB,,, | Performed by: NURSE PRACTITIONER

## 2023-01-23 PROCEDURE — 99999 PR PBB SHADOW E&M-EST. PATIENT-LVL IV: ICD-10-PCS | Mod: PBBFAC,,, | Performed by: NURSE PRACTITIONER

## 2023-01-23 RX ADMIN — IOHEXOL 100 ML: 350 INJECTION, SOLUTION INTRAVENOUS at 06:01

## 2023-01-23 NOTE — PATIENT INSTRUCTIONS

## 2023-01-23 NOTE — PROGRESS NOTES
Subjective:       Patient ID: Hayley Montana is a 52 y.o. female Body mass index is 30.5 kg/m².    Chief Complaint: Abdominal Pain (Started Thursday, lower abd pain. Increase in flatulence. Sore when pressing. Had fever last night )    This patient is new to me.  Referring Provider: Dr. Christen Horton.     Abdominal Pain  This is a new problem. The current episode started in the past 7 days (started 1/19/2023). The problem occurs daily (was constant for first 2 days, and now intermittent, occurs daily). The problem has been gradually improving. The pain is located in the LLQ, RLQ and suprapubic region (mostly on LLQ). Quality: described as tightness pain and gas. The abdominal pain does not radiate. Associated symptoms include a fever (had fever last night up to 100 F; none today) and flatus (increased). Pertinent negatives include no belching, constipation, diarrhea, dysuria, frequency, hematochezia, melena, nausea, vomiting or weight loss. Associated symptoms comments: Bowel movements are 2-3 times a day of formed stool. Nothing aggravates the pain. The pain is relieved by Nothing. There is no history of GERD.     Review of Systems   Constitutional:  Positive for activity change (increased exercise since 12/2022; was doing a different abdominal workout) and fever (had fever last night up to 100 F; none today). Negative for appetite change, chills, fatigue and weight loss.        Reports started plexus and another OTC supplement recently to try to stop taking vyvanse   HENT:  Positive for ear pain. Negative for sore throat and trouble swallowing.    Respiratory:  Negative for cough, choking and shortness of breath.    Cardiovascular:  Negative for chest pain.   Gastrointestinal:  Positive for abdominal pain and flatus (increased). Negative for anal bleeding, blood in stool, constipation, diarrhea, hematochezia, melena, nausea, rectal pain and vomiting.   Genitourinary:  Negative for difficulty urinating,  dysuria, flank pain and frequency.   Neurological:  Negative for weakness.       Patient's last menstrual period was 2016. S/p hysterectomy.  Past Medical History:   Diagnosis Date    ADHD (attention deficit hyperactivity disorder)     Anxiety     Complete rotator cuff tear or rupture of right shoulder, not specified as traumatic 2018    Depression     General anesthetics causing adverse effect in therapeutic use     delayed recovery     Past Surgical History:   Procedure Laterality Date     SECTION      x2    COLONOSCOPY  2018    Colonoscopy Dr. Wolf Marsh-- Recomended colonoscopy in 10 years.     COSMETIC SURGERY      breast lift tummy tuck    HYSTERECTOMY      Indication: Pain    SHOULDER ARTHROSCOPY      TONSILLECTOMY      TOTAL REDUCTION MAMMOPLASTY Bilateral     UPPER GASTROINTESTINAL ENDOSCOPY  2018    biopsy report in procedures     Family History   Problem Relation Age of Onset    Neuropathy Mother     Hypertension Mother     Dementia Mother     Pancreatic cancer Father     Diabetes Father     Thyroid disease Sister     Hernia Sister     Scleroderma Sister     No Known Problems Sister     Diabetes Brother     No Known Problems Daughter     Asthma Son     ADD / ADHD Son     Colon cancer Neg Hx     Crohn's disease Neg Hx     Esophageal cancer Neg Hx     Stomach cancer Neg Hx     Ulcerative colitis Neg Hx      Social History     Tobacco Use    Smoking status: Never    Smokeless tobacco: Never   Substance Use Topics    Alcohol use: Yes     Comment: social- not on a weekly basis    Drug use: No     Wt Readings from Last 10 Encounters:   23 91 kg (200 lb 9.9 oz)   22 90.9 kg (200 lb 6.4 oz)   22 90.6 kg (199 lb 11.8 oz)   22 88 kg (194 lb 0.1 oz)   21 93.3 kg (205 lb 11 oz)   21 94.7 kg (208 lb 10.7 oz)   20 90.9 kg (200 lb 6.4 oz)   10/23/20 93.4 kg (205 lb 12.8 oz)   10/15/20 93.9 kg (207 lb 2 oz)   10/05/20 94.7 kg (208 lb 12.4 oz)      Lab Results   Component Value Date    WBC 8.16 09/29/2022    HGB 13.5 09/29/2022    HCT 41.9 09/29/2022    MCV 96 09/29/2022     09/29/2022     CMP  Sodium   Date Value Ref Range Status   09/29/2022 144 136 - 145 mmol/L Final     Potassium   Date Value Ref Range Status   09/29/2022 4.4 3.5 - 5.1 mmol/L Final     Chloride   Date Value Ref Range Status   09/29/2022 104 95 - 110 mmol/L Final     CO2   Date Value Ref Range Status   09/29/2022 27 23 - 29 mmol/L Final     Glucose   Date Value Ref Range Status   09/29/2022 87 70 - 110 mg/dL Final     BUN   Date Value Ref Range Status   09/29/2022 20 6 - 20 mg/dL Final     Creatinine   Date Value Ref Range Status   09/29/2022 1.0 0.5 - 1.4 mg/dL Final     Calcium   Date Value Ref Range Status   09/29/2022 9.9 8.7 - 10.5 mg/dL Final     Total Protein   Date Value Ref Range Status   09/29/2022 7.6 6.0 - 8.4 g/dL Final     Albumin   Date Value Ref Range Status   09/29/2022 4.1 3.5 - 5.2 g/dL Final     Total Bilirubin   Date Value Ref Range Status   09/29/2022 0.3 0.1 - 1.0 mg/dL Final     Comment:     For infants and newborns, interpretation of results should be based  on gestational age, weight and in agreement with clinical  observations.    Premature Infant recommended reference ranges:  Up to 24 hours.............<8.0 mg/dL  Up to 48 hours............<12.0 mg/dL  3-5 days..................<15.0 mg/dL  6-29 days.................<15.0 mg/dL       Alkaline Phosphatase   Date Value Ref Range Status   09/29/2022 84 55 - 135 U/L Final     AST   Date Value Ref Range Status   09/29/2022 15 10 - 40 U/L Final     ALT   Date Value Ref Range Status   09/29/2022 13 10 - 44 U/L Final     Anion Gap   Date Value Ref Range Status   09/29/2022 13 8 - 16 mmol/L Final     eGFR if    Date Value Ref Range Status   09/24/2021 >60.0 >60 mL/min/1.73 m^2 Final     eGFR if non    Date Value Ref Range Status   09/24/2021 >60.0 >60 mL/min/1.73 m^2 Final      Comment:     Calculation used to obtain the estimated glomerular filtration  rate (eGFR) is the CKD-EPI equation.        Lab Results   Component Value Date    TSH 0.891 09/29/2022     Reviewed prior medical records including endoscopy history (see surgical history/procedures).    Objective:      Physical Exam  Vitals and nursing note reviewed.   Constitutional:       General: She is not in acute distress.     Appearance: Normal appearance. She is well-developed. She is not diaphoretic.   HENT:      Mouth/Throat:      Lips: Pink. No lesions.      Mouth: Mucous membranes are moist. No oral lesions.      Tongue: No lesions.      Pharynx: Oropharynx is clear. No pharyngeal swelling or posterior oropharyngeal erythema.   Eyes:      General: No scleral icterus.     Conjunctiva/sclera: Conjunctivae normal.   Pulmonary:      Effort: Pulmonary effort is normal. No respiratory distress.      Breath sounds: Normal breath sounds. No wheezing.   Abdominal:      General: Bowel sounds are normal. There is no distension or abdominal bruit.      Palpations: Abdomen is soft. Abdomen is not rigid. There is no mass.      Tenderness: There is abdominal tenderness (mild) in the right lower quadrant, suprapubic area and left lower quadrant. There is no guarding or rebound. Negative signs include Lilly's sign and McBurney's sign.   Skin:     General: Skin is warm and dry.      Coloration: Skin is not jaundiced or pale.      Findings: No erythema or rash.   Neurological:      Mental Status: She is alert and oriented to person, place, and time.   Psychiatric:         Behavior: Behavior normal.         Thought Content: Thought content normal.         Judgment: Judgment normal.       Assessment:       1. Lower abdominal pain    2. LLQ pain    3. Otalgia, unspecified laterality          Plan:       Lower abdominal pain & LLQ pain  -     CBC Without Differential; Future; Expected date: 01/23/2023  -     Creatinine, Serum; Future; Expected date:  01/23/2023  -     CT Abdomen Pelvis With Contrast; Future; Expected date: 01/23/2023  - If no improvement in symptoms or symptoms worsen, call/follow-up at clinic or go to ER    Otalgia, unspecified laterality  Recommend follow-up with Primary Care Provider/ENT for continued evaluation and management.    Follow up in about 1 month (around 2/23/2023), or if symptoms worsen or fail to improve.      If no improvement in symptoms or symptoms worsen, call/follow-up at clinic or go to ER.        25 minutes of total time spent on the encounter, which includes face to face time and non-face to face time preparing to see the patient (e.g., review of tests), Obtaining and/or reviewing separately obtained history, Documenting clinical information in the electronic or other health record, Independently interpreting results (not separately reported) and communicating results to the patient/family/caregiver, or Care coordination (not separately reported).

## 2023-01-24 ENCOUNTER — TELEPHONE (OUTPATIENT)
Dept: GASTROENTEROLOGY | Facility: CLINIC | Age: 53
End: 2023-01-24
Payer: COMMERCIAL

## 2023-01-24 ENCOUNTER — PATIENT MESSAGE (OUTPATIENT)
Dept: GASTROENTEROLOGY | Facility: CLINIC | Age: 53
End: 2023-01-24
Payer: COMMERCIAL

## 2023-01-24 DIAGNOSIS — K57.32 DIVERTICULITIS OF LARGE INTESTINE WITHOUT PERFORATION OR ABSCESS WITHOUT BLEEDING: Primary | ICD-10-CM

## 2023-01-24 RX ORDER — METRONIDAZOLE 500 MG/1
500 TABLET ORAL 3 TIMES DAILY
Qty: 30 TABLET | Refills: 0 | Status: SHIPPED | OUTPATIENT
Start: 2023-01-24 | End: 2023-02-03

## 2023-01-24 RX ORDER — CIPROFLOXACIN 500 MG/1
500 TABLET ORAL 2 TIMES DAILY
Qty: 20 TABLET | Refills: 0 | Status: SHIPPED | OUTPATIENT
Start: 2023-01-24 | End: 2023-02-03

## 2023-01-24 NOTE — TELEPHONE ENCOUNTER
Called patient in regards to two week follow up scheduled and colonoscopy scheduled. Pt verbalized understanding

## 2023-01-24 NOTE — TELEPHONE ENCOUNTER
Appears the nurse has addressed everything. Attached low fiber diet instructions on clinical references and sent to patient.  QUINNP

## 2023-01-24 NOTE — TELEPHONE ENCOUNTER
Spoke with patient in regards to CT results. Patient would like referral for dietician. Pt given number for [facility] dietician. Informed patient I will call her once 2 week f/u and colonoscopy have been scheduled. Pt verbalized understanding of all recommendations and results. Pt also stated she has been borderline anemia and wanting more information of keeping anemia under control. Informed patient her H&H was within normal levels.

## 2023-01-24 NOTE — TELEPHONE ENCOUNTER
"Please call to inform & review the results with the patient- radiology report of the CT abdomen pelvis showed "Acute sigmoid colon diverticulitis". Recommend treating this with a course of antibiotics. Cipro and flagyl sent to pharmacy on file. -instruct patient to NOT drink any alcohol while taking and for at least 14 days after taking flagyl since it interacts with one another and can cause severe upset stomach and vomiting.    Patient needs to follow-up in clinic in 2 weeks and will need a colonoscopy to be done in 4-6 weeks.  - advise to eat a low fiber diet for the next 4 weeks and then slowly increase fiber in diet. A low fiber diet is recommended for diverticulitis (for about 4 weeks), but to prevent diverticulitis, high fiber diet is recommended.  -Recommended high fiber diet after episode has completely resolved. Recommended daily exercise, adequate water intake (six 8-oz glasses of water daily), and high fiber diet. OTC fiber supplements are recommended if diet does not reach daily fiber goal (25 grams daily), such as Metamucil, Citrucel, or FiberCon (take as directed, separate from other oral medications by >2 hours).  Otherwise, unremarkable findings of the GI tract/GI organs.  Other findings showed degenerative findings of the spine: Recommend follow-up with Primary Care Provider/ortho for continued evaluation and management of this finding.    Continue with previous recommendations. If no improvement in symptoms or symptoms worsen, call/follow-up at clinic or go to ER.    Thanks,      "

## 2023-02-06 ENCOUNTER — PATIENT MESSAGE (OUTPATIENT)
Dept: GASTROENTEROLOGY | Facility: CLINIC | Age: 53
End: 2023-02-06
Payer: COMMERCIAL

## 2023-02-13 ENCOUNTER — OFFICE VISIT (OUTPATIENT)
Dept: GASTROENTEROLOGY | Facility: CLINIC | Age: 53
End: 2023-02-13
Payer: COMMERCIAL

## 2023-02-13 VITALS — WEIGHT: 200.63 LBS | BODY MASS INDEX: 30.41 KG/M2 | HEIGHT: 68 IN

## 2023-02-13 DIAGNOSIS — Z87.19 HISTORY OF DIVERTICULITIS: Primary | ICD-10-CM

## 2023-02-13 DIAGNOSIS — K92.1 HEMATOCHEZIA: ICD-10-CM

## 2023-02-13 DIAGNOSIS — R10.32 LLQ ABDOMINAL PAIN: ICD-10-CM

## 2023-02-13 PROCEDURE — 3008F PR BODY MASS INDEX (BMI) DOCUMENTED: ICD-10-PCS | Mod: CPTII,S$GLB,, | Performed by: NURSE PRACTITIONER

## 2023-02-13 PROCEDURE — 1160F PR REVIEW ALL MEDS BY PRESCRIBER/CLIN PHARMACIST DOCUMENTED: ICD-10-PCS | Mod: CPTII,S$GLB,, | Performed by: NURSE PRACTITIONER

## 2023-02-13 PROCEDURE — 99214 PR OFFICE/OUTPT VISIT, EST, LEVL IV, 30-39 MIN: ICD-10-PCS | Mod: S$GLB,,, | Performed by: NURSE PRACTITIONER

## 2023-02-13 PROCEDURE — 1159F PR MEDICATION LIST DOCUMENTED IN MEDICAL RECORD: ICD-10-PCS | Mod: CPTII,S$GLB,, | Performed by: NURSE PRACTITIONER

## 2023-02-13 PROCEDURE — 99214 OFFICE O/P EST MOD 30 MIN: CPT | Mod: S$GLB,,, | Performed by: NURSE PRACTITIONER

## 2023-02-13 PROCEDURE — 3008F BODY MASS INDEX DOCD: CPT | Mod: CPTII,S$GLB,, | Performed by: NURSE PRACTITIONER

## 2023-02-13 PROCEDURE — 99999 PR PBB SHADOW E&M-EST. PATIENT-LVL III: CPT | Mod: PBBFAC,,, | Performed by: NURSE PRACTITIONER

## 2023-02-13 PROCEDURE — 99999 PR PBB SHADOW E&M-EST. PATIENT-LVL III: ICD-10-PCS | Mod: PBBFAC,,, | Performed by: NURSE PRACTITIONER

## 2023-02-13 PROCEDURE — 1159F MED LIST DOCD IN RCRD: CPT | Mod: CPTII,S$GLB,, | Performed by: NURSE PRACTITIONER

## 2023-02-13 PROCEDURE — 1160F RVW MEDS BY RX/DR IN RCRD: CPT | Mod: CPTII,S$GLB,, | Performed by: NURSE PRACTITIONER

## 2023-02-13 RX ORDER — CIPROFLOXACIN 500 MG/1
500 TABLET ORAL
COMMUNITY
End: 2023-02-28

## 2023-02-13 RX ORDER — METRONIDAZOLE 500 MG/1
500 TABLET ORAL
COMMUNITY
End: 2023-02-28

## 2023-02-13 NOTE — PROGRESS NOTES
Subjective:       Patient ID: Hayley Montana is a 52 y.o. female Body mass index is 30.5 kg/m².    Chief Complaint: Other (Follow-up)    This patient is established.     Abdominal Pain  The current episode started in the past 7 days (started 1/19/2023 with pain; spasms intermittent for the past 2 years). Episode frequency: lasted 4-5 days. The problem has been resolved (reports she waited 1 week to take antibiotics since the pain had resolved after our last visit; started it a week after I had prescribed it due to mild tenderness with pressing her abdomen). The pain is located in the LLQ (when it was present; described as tightness pain and gas). The pain is at a severity of 0/10 (currently). The abdominal pain does not radiate. Associated symptoms include flatus (increased) and hematochezia (reports occasional small amount of bright red blood on tissue and in bowl; denies bleeding in between bowel movement). Pertinent negatives include no belching, constipation, diarrhea, dysuria, fever, frequency, melena, nausea, vomiting or weight loss. Associated symptoms comments: Bowel movements are 2-3 times a day of formed stool. Nothing aggravates the pain. She has tried antibiotics (taking cipro and flagyl 10 day course now, reports has ~2 days left of it) for the symptoms. The treatment provided significant relief. Prior diagnostic workup includes CT scan. There is no history of GERD.     Review of Systems   Constitutional:  Positive for activity change (increased exercise since 12/2022; was doing a different abdominal workout). Negative for appetite change, chills, fatigue, fever and weight loss.        Reports started plexus and another OTC supplement recently to try to stop taking vyvanse   HENT:  Negative for ear pain, sore throat and trouble swallowing.    Respiratory:  Negative for cough, choking and shortness of breath.    Cardiovascular:  Negative for chest pain.   Gastrointestinal:  Positive for abdominal pain,  flatus (increased) and hematochezia (reports occasional small amount of bright red blood on tissue and in bowl; denies bleeding in between bowel movement). Negative for constipation, diarrhea, melena, nausea, rectal pain and vomiting.   Genitourinary:  Negative for difficulty urinating, dysuria, flank pain and frequency.   Neurological:  Negative for weakness.       Patient's last menstrual period was 2016. S/p hysterectomy.  Past Medical History:   Diagnosis Date    ADHD (attention deficit hyperactivity disorder)     Anxiety     Complete rotator cuff tear or rupture of right shoulder, not specified as traumatic 2018    Depression     General anesthetics causing adverse effect in therapeutic use     delayed recovery     Past Surgical History:   Procedure Laterality Date     SECTION      x2    COLONOSCOPY  2018    Colonoscopy Dr. Wolf Marsh-- Recomended colonoscopy in 10 years.     COSMETIC SURGERY      breast lift tummy tuck    HYSTERECTOMY      Indication: Pain    SHOULDER ARTHROSCOPY      TONSILLECTOMY      TOTAL REDUCTION MAMMOPLASTY Bilateral 2006    UPPER GASTROINTESTINAL ENDOSCOPY  2018    biopsy report in procedures     Family History   Problem Relation Age of Onset    Neuropathy Mother     Hypertension Mother     Dementia Mother     Pancreatic cancer Father     Diabetes Father     Thyroid disease Sister     Hernia Sister     Scleroderma Sister     No Known Problems Sister     Diabetes Brother     No Known Problems Daughter     Asthma Son     ADD / ADHD Son     Colon cancer Neg Hx     Crohn's disease Neg Hx     Esophageal cancer Neg Hx     Stomach cancer Neg Hx     Ulcerative colitis Neg Hx      Social History     Tobacco Use    Smoking status: Never    Smokeless tobacco: Never   Substance Use Topics    Alcohol use: Yes     Comment: social- not on a weekly basis    Drug use: No     Wt Readings from Last 10 Encounters:   23 91 kg (200 lb 9.9 oz)   23 91 kg (200 lb  9.9 oz)   11/17/22 90.9 kg (200 lb 6.4 oz)   09/30/22 90.6 kg (199 lb 11.8 oz)   09/07/22 88 kg (194 lb 0.1 oz)   09/21/21 93.3 kg (205 lb 11 oz)   04/26/21 94.7 kg (208 lb 10.7 oz)   12/30/20 90.9 kg (200 lb 6.4 oz)   10/23/20 93.4 kg (205 lb 12.8 oz)   10/15/20 93.9 kg (207 lb 2 oz)     Lab Results   Component Value Date    WBC 9.62 01/23/2023    HGB 13.6 01/23/2023    HCT 42.2 01/23/2023    MCV 99 (H) 01/23/2023     01/23/2023     CMP  Sodium   Date Value Ref Range Status   09/29/2022 144 136 - 145 mmol/L Final     Potassium   Date Value Ref Range Status   09/29/2022 4.4 3.5 - 5.1 mmol/L Final     Chloride   Date Value Ref Range Status   09/29/2022 104 95 - 110 mmol/L Final     CO2   Date Value Ref Range Status   09/29/2022 27 23 - 29 mmol/L Final     Glucose   Date Value Ref Range Status   09/29/2022 87 70 - 110 mg/dL Final     BUN   Date Value Ref Range Status   09/29/2022 20 6 - 20 mg/dL Final     Creatinine   Date Value Ref Range Status   01/23/2023 0.7 0.5 - 1.4 mg/dL Final     Calcium   Date Value Ref Range Status   09/29/2022 9.9 8.7 - 10.5 mg/dL Final     Total Protein   Date Value Ref Range Status   09/29/2022 7.6 6.0 - 8.4 g/dL Final     Albumin   Date Value Ref Range Status   09/29/2022 4.1 3.5 - 5.2 g/dL Final     Total Bilirubin   Date Value Ref Range Status   09/29/2022 0.3 0.1 - 1.0 mg/dL Final     Comment:     For infants and newborns, interpretation of results should be based  on gestational age, weight and in agreement with clinical  observations.    Premature Infant recommended reference ranges:  Up to 24 hours.............<8.0 mg/dL  Up to 48 hours............<12.0 mg/dL  3-5 days..................<15.0 mg/dL  6-29 days.................<15.0 mg/dL       Alkaline Phosphatase   Date Value Ref Range Status   09/29/2022 84 55 - 135 U/L Final     AST   Date Value Ref Range Status   09/29/2022 15 10 - 40 U/L Final     ALT   Date Value Ref Range Status   09/29/2022 13 10 - 44 U/L Final      Anion Gap   Date Value Ref Range Status   09/29/2022 13 8 - 16 mmol/L Final     eGFR if    Date Value Ref Range Status   09/24/2021 >60.0 >60 mL/min/1.73 m^2 Final     eGFR if non    Date Value Ref Range Status   09/24/2021 >60.0 >60 mL/min/1.73 m^2 Final     Comment:     Calculation used to obtain the estimated glomerular filtration  rate (eGFR) is the CKD-EPI equation.        Lab Results   Component Value Date    TSH 0.891 09/29/2022     Reviewed prior medical records including radiology report of 1/23/2023 CT abdomen pelvis; & endoscopy history (see surgical history).    Objective:      Physical Exam  Vitals and nursing note reviewed.   Constitutional:       General: She is not in acute distress.     Appearance: Normal appearance. She is well-developed. She is not diaphoretic.   HENT:      Mouth/Throat:      Lips: Pink. No lesions.      Mouth: Mucous membranes are moist. No oral lesions.      Tongue: No lesions.      Pharynx: Oropharynx is clear. No pharyngeal swelling or posterior oropharyngeal erythema.   Eyes:      General: No scleral icterus.     Conjunctiva/sclera: Conjunctivae normal.   Pulmonary:      Effort: Pulmonary effort is normal. No respiratory distress.      Breath sounds: Normal breath sounds. No wheezing.   Abdominal:      General: Bowel sounds are normal. There is no distension or abdominal bruit.      Palpations: Abdomen is soft. Abdomen is not rigid. There is no mass.      Tenderness: There is abdominal tenderness (mild) in the left lower quadrant. There is no guarding or rebound. Negative signs include Lilly's sign and McBurney's sign.      Comments: Deferred rectal examination.   Skin:     General: Skin is warm and dry.      Coloration: Skin is not jaundiced or pale.      Findings: No erythema or rash.   Neurological:      Mental Status: She is alert and oriented to person, place, and time.   Psychiatric:         Behavior: Behavior normal.         Thought  Content: Thought content normal.         Judgment: Judgment normal.       Assessment:       1. History of diverticulitis    2. LLQ abdominal pain    3. Hematochezia       Plan:       History of diverticulitis & LLQ abdominal pain  - COMPLETE CIPRO AND FLAGYL AS PRESCRIBED  - CONTINUE WITH COLONOSCOPY AS SCHEDULED FOR 3/2/2023  - discussed the diagnosis of diverticulosis and diverticulitis, advised to continue a low fiber diet for the next 4 weeks and then slowly increase fiber in diet. Advised a low fiber diet is recommended for diverticulitis (for about 4 weeks), but to prevent diverticulitis, high fiber diet is recommended.  -Recommended high fiber diet after episode has completely resolved. Recommended daily exercise, adequate water intake (six 8-oz glasses of water daily), and high fiber diet. OTC fiber supplements are recommended if diet does not reach daily fiber goal (25 grams daily), such as Metamucil, Citrucel, or FiberCon (take as directed, separate from other oral medications by >2 hours).  - instructed patient that if symptoms do not resolve or if worsen prior to colonoscopy to contact us or go to ER, patient verbalized understanding  - discussed with patient that if episodes of diverticulitis recur frequently, may need to consult general surgery    Hematochezia  - CONTINUE WITH COLONOSCOPY AS SCHEDULED FOR 3/2/2023  - discussed about different etiologies that can cause rectal bleeding, such as but not limited to diverticulosis, polyps, colon mass, colon inflammation or infection, anal fissure or hemorrhoids.   - You may resume normal activity as long as you feel well.  - Avoid/minimize NSAIDs such as ibuprofen (Advil, Motrin) and naproxen (Aleve and Naprosyn).  - Avoid alcohol.    Follow up in about 1 month (around 3/13/2023), or if symptoms worsen or fail to improve.      If no improvement in symptoms or symptoms worsen, call/follow-up at clinic or go to ER.        32 minutes of total time spent on the  encounter, which includes face to face time and non-face to face time preparing to see the patient (e.g., review of tests), Obtaining and/or reviewing separately obtained history, Documenting clinical information in the electronic or other health record, Independently interpreting results (not separately reported) and communicating results to the patient/family/caregiver, or Care coordination (not separately reported).

## 2023-02-13 NOTE — PATIENT INSTRUCTIONS
Diverticulitis    Some people get pouches along the wall of the colon as they get older. The pouches, called diverticuli, usually cause no symptoms. If the pouches become blocked, you can get an infection. This infection is called diverticulitis. It causes pain in your lower abdomen and fever. If not treated, it can become a serious condition, causing an abscess to form inside the pouch. The abscess may block the intestinal tract even or rupture, spreading infection throughout the abdomen.  When treatment is started early, oral antibiotics alone may be enough to cure diverticulitis. This method is tried first. But, if you don't improve or if your condition gets worse while using oral antibiotics, you may need to be admitted to the hospital for IV antibiotics. Severe cases may require surgery.  Home care  The following guidelines will help you care for yourself at home:  During the acute illness, rest and follow your healthcare provider's instructions about diet. Sometimes you will need to follow a clear liquid diet to rest your bowel. Once your symptoms are better, you may be told to follow a low-fiber diet for some time. Include foods like:  Flake cereal, mashed potatoes, pancakes, waffles, pasta, white bread, rice, applesauce, bananas, eggs, fish, poultry, tofu, and cooked soft vegetables  Take antibiotics exactly as instructed. Don't miss any doses or stop taking the medication, even if you feel better.  Monitor your temperature and tell your healthcare provider if you have rising temperatures.  Preventing future attacks  Once you have an episode of diverticulitis, you are at risk for having it again. After you have recovered from this episode, you may be able to lower your risk by eating a high-fiber diet (20 gm/day to 35 gm/day of fiber). This cleans out the colon pouches that already exist and may prevent new ones from forming. Foods high in fiber include fresh fruits and edible peelings, raw or lightly cooked  REFILL FOR CYCLE   vegetables, whole grain cereals and breads, dried beans and peas, and bran.  Other steps that can help prevent future attacks include:  Take your medicines, such as antibiotics, as your healthcare provider says.  Drink 6 to 8 glasses of water every day, unless told otherwise.  Use a heating pad or hot water bottle to help abdominal cramping or pain.  Begin an exercise program. Ask your healthcare provider how to get started. You can benefit from simple activities such as walking or gardening.  Treat diarrhea with a bland diet. Start with liquids only; then slowly add fiber over time.  Watch for changes in your bowel movements (constipation to diarrhea). Avoid constipation by eating a high fiber diet and taking a stool softener if needed.  Get plenty of rest and sleep.  Follow-up care  Follow up with your healthcare provider as advised or sooner if you are not getting better in the next 2 days.  When to seek medical advice  Call your healthcare provider right away if any of these occur:  Fever of 100.4°F (38°C) or higher, or as directed by your healthcare provider  Repeated vomiting or swelling of the abdomen  Weakness, dizziness, light-headedness  Pain in your abdomen that gets worse, severe, or spreads to your back  Pain that moves to the right lower abdomen  Rectal bleeding (stools that are red, black or maroon color)  Unexpected vaginal bleeding  Date Last Reviewed: 9/1/2016  © 2153-6750 [employer] [address] [place] [address], [place] [address]. All rights reserved. This information is not intended as a substitute for professional medical care. Always follow your healthcare professional's instructions.

## 2023-02-24 ENCOUNTER — PATIENT MESSAGE (OUTPATIENT)
Dept: ENDOSCOPY | Facility: HOSPITAL | Age: 53
End: 2023-02-24
Payer: COMMERCIAL

## 2023-03-01 ENCOUNTER — PATIENT MESSAGE (OUTPATIENT)
Dept: INTERNAL MEDICINE | Facility: CLINIC | Age: 53
End: 2023-03-01
Payer: COMMERCIAL

## 2023-03-01 DIAGNOSIS — Z12.31 ENCOUNTER FOR SCREENING MAMMOGRAM FOR MALIGNANT NEOPLASM OF BREAST: Primary | ICD-10-CM

## 2023-03-02 ENCOUNTER — ANESTHESIA (OUTPATIENT)
Dept: ENDOSCOPY | Facility: HOSPITAL | Age: 53
End: 2023-03-02
Payer: COMMERCIAL

## 2023-03-02 ENCOUNTER — ANESTHESIA EVENT (OUTPATIENT)
Dept: ENDOSCOPY | Facility: HOSPITAL | Age: 53
End: 2023-03-02
Payer: COMMERCIAL

## 2023-03-02 ENCOUNTER — HOSPITAL ENCOUNTER (OUTPATIENT)
Facility: HOSPITAL | Age: 53
Discharge: HOME | End: 2023-03-02
Attending: INTERNAL MEDICINE | Admitting: INTERNAL MEDICINE
Payer: COMMERCIAL

## 2023-03-02 DIAGNOSIS — R93.3 ABNORMAL CT SCAN, COLON: ICD-10-CM

## 2023-03-02 PROCEDURE — 37000009 HC ANESTHESIA EA ADD 15 MINS: Mod: PO | Performed by: INTERNAL MEDICINE

## 2023-03-02 PROCEDURE — 25000003 PHARM REV CODE 250: Mod: PO | Performed by: NURSE ANESTHETIST, CERTIFIED REGISTERED

## 2023-03-02 PROCEDURE — D9220A PRA ANESTHESIA: Mod: ANES,,, | Performed by: ANESTHESIOLOGY

## 2023-03-02 PROCEDURE — D9220A PRA ANESTHESIA: ICD-10-PCS | Mod: CRNA,,, | Performed by: NURSE ANESTHETIST, CERTIFIED REGISTERED

## 2023-03-02 PROCEDURE — 45378 DIAGNOSTIC COLONOSCOPY: CPT | Mod: PO | Performed by: INTERNAL MEDICINE

## 2023-03-02 PROCEDURE — 37000008 HC ANESTHESIA 1ST 15 MINUTES: Mod: PO | Performed by: INTERNAL MEDICINE

## 2023-03-02 PROCEDURE — D9220A PRA ANESTHESIA: ICD-10-PCS | Mod: ANES,,, | Performed by: ANESTHESIOLOGY

## 2023-03-02 PROCEDURE — 63600175 PHARM REV CODE 636 W HCPCS: Mod: PO | Performed by: NURSE ANESTHETIST, CERTIFIED REGISTERED

## 2023-03-02 PROCEDURE — D9220A PRA ANESTHESIA: Mod: CRNA,,, | Performed by: NURSE ANESTHETIST, CERTIFIED REGISTERED

## 2023-03-02 PROCEDURE — 45378 PR COLONOSCOPY,DIAGNOSTIC: ICD-10-PCS | Mod: ,,, | Performed by: INTERNAL MEDICINE

## 2023-03-02 PROCEDURE — 63600175 PHARM REV CODE 636 W HCPCS: Mod: PO | Performed by: INTERNAL MEDICINE

## 2023-03-02 PROCEDURE — 45378 DIAGNOSTIC COLONOSCOPY: CPT | Mod: ,,, | Performed by: INTERNAL MEDICINE

## 2023-03-02 RX ORDER — SODIUM CHLORIDE, SODIUM LACTATE, POTASSIUM CHLORIDE, CALCIUM CHLORIDE 600; 310; 30; 20 MG/100ML; MG/100ML; MG/100ML; MG/100ML
INJECTION, SOLUTION INTRAVENOUS CONTINUOUS
Status: DISCONTINUED | OUTPATIENT
Start: 2023-03-02 | End: 2023-03-02 | Stop reason: HOSPADM

## 2023-03-02 RX ORDER — SODIUM CHLORIDE 0.9 % (FLUSH) 0.9 %
10 SYRINGE (ML) INJECTION
Status: DISCONTINUED | OUTPATIENT
Start: 2023-03-02 | End: 2023-03-02 | Stop reason: HOSPADM

## 2023-03-02 RX ORDER — LIDOCAINE HCL/PF 100 MG/5ML
SYRINGE (ML) INTRAVENOUS
Status: DISCONTINUED | OUTPATIENT
Start: 2023-03-02 | End: 2023-03-02

## 2023-03-02 RX ORDER — PROPOFOL 10 MG/ML
VIAL (ML) INTRAVENOUS
Status: DISCONTINUED | OUTPATIENT
Start: 2023-03-02 | End: 2023-03-02

## 2023-03-02 RX ADMIN — SODIUM CHLORIDE, POTASSIUM CHLORIDE, SODIUM LACTATE AND CALCIUM CHLORIDE: 600; 310; 30; 20 INJECTION, SOLUTION INTRAVENOUS at 12:03

## 2023-03-02 RX ADMIN — PROPOFOL 100 MG: 10 INJECTION, EMULSION INTRAVENOUS at 12:03

## 2023-03-02 RX ADMIN — PROPOFOL 50 MG: 10 INJECTION, EMULSION INTRAVENOUS at 12:03

## 2023-03-02 RX ADMIN — LIDOCAINE HYDROCHLORIDE 100 MG: 20 INJECTION, SOLUTION INTRAVENOUS at 12:03

## 2023-03-02 NOTE — [employer]ATION PATIENT INSTRUCTIONS
Discharge Summary/Instructions after an Endoscopic Procedure  Patient Name: Hayley Montana  Patient MRN: [id number]  Patient YOB: 1970 Thursday, March 2, 2023  Doroteo Kahn MD  Dear patient,  As a result of recent federal legislation (The Federal Cures Act), you may   receive lab or pathology results from your procedure in your [facility]   account before your physician is able to contact you. Your physician or   their representative will relay the results to you with their   recommendations at their soonest availability.  Thank you,  RESTRICTIONS:  During your procedure today, you received medications for sedation.  These   medications may affect your judgment, balance and coordination.  Therefore,   for 24 hours, you have the following restrictions:   - DO NOT drive a car, operate machinery, make legal/financial decisions,   sign important papers or drink alcohol.    ACTIVITY:  Today: no heavy lifting, straining or running due to procedural   sedation/anesthesia.  The following day: return to full activity including work.  DIET:  Eat and drink normally unless instructed otherwise.     TREATMENT FOR COMMON SIDE EFFECTS:  - Mild abdominal pain, nausea, belching, bloating or excessive gas:  rest,   eat lightly and use a heating pad.  - Sore Throat: treat with throat lozenges and/or gargle with warm salt   water.  - Because air was used during the procedure, expelling large amounts of air   from your rectum or belching is normal.  - If a bowel prep was taken, you may not have a bowel movement for 1-3 days.    This is normal.  SYMPTOMS TO WATCH FOR AND REPORT TO YOUR PHYSICIAN:  1. Abdominal pain or bloating, other than gas cramps.  2. Chest pain.  3. Back pain.  4. Signs of infection such as: chills or fever occurring within 24 hours   after the procedure.  5. Rectal bleeding, which would show as bright red, maroon, or black stools.   (A tablespoon of blood from the rectum is not serious, especially  if   hemorrhoids are present.)  6. Vomiting.  7. Weakness or dizziness.  GO DIRECTLY TO THE NEAREST EMERGENCY ROOM IF YOU HAVE ANY OF THE FOLLOWING:      Difficulty breathing              Chills and/or fever over 101 F   Persistent vomiting and/or vomiting blood   Severe abdominal pain   Severe chest pain   Black, tarry stools   Bleeding- more than one tablespoon   Any other symptom or condition that you feel may need urgent attention  Your doctor recommends these additional instructions:  If any biopsies were taken, your doctors clinic will contact you in 1 to 2   weeks with any results.  Your physician has recommended a repeat colonoscopy in 10 years for   screening purposes.   You are being discharged to home.  For questions, problems or results please call your physician - Doroteo Kahn MD at Work:  [phone].  EMERGENCY PHONE NUMBER: [phone], LAB RESULTS: [phone]  IF A COMPLICATION OR EMERGENCY SITUATION ARISES AND YOU ARE UNABLE TO REACH   YOUR PHYSICIAN - GO DIRECTLY TO THE EMERGENCY ROOM.  ___________________________________________  Nurse Signature  ___________________________________________  Patient/Designated Responsible Party Signature  Doroteo Kahn MD  3/2/2023 12:52:07 PM  This report has been verified and signed electronically.  Dear patient,  As a result of recent federal legislation (The Federal Cures Act), you may   receive lab or pathology results from your procedure in your [facility]   account before your physician is able to contact you. Your physician or   their representative will relay the results to you with their   recommendations at their soonest availability.  Thank you.  [employer]ATION

## 2023-03-02 NOTE — ANESTHESIA POSTPROCEDURE EVALUATION
Anesthesia Post Evaluation    Patient: Hayley Montana    Procedure(s) Performed: Procedure(s) (LRB):  COLONOSCOPY (N/A)    Final Anesthesia Type: general      Patient location during evaluation: PACU  Patient participation: Yes- Able to Participate  Level of consciousness: sedated and awake  Post-procedure vital signs: reviewed and stable  Pain management: adequate  Airway patency: patent    PONV status at discharge: No PONV  Anesthetic complications: no      Cardiovascular status: blood pressure returned to baseline  Respiratory status: spontaneous ventilation  Hydration status: euvolemic  Follow-up not needed.          Vitals Value Taken Time   /58 03/02/23 1243   Temp  03/02/23 1309   Pulse 68 03/02/23 1243   Resp 17 03/02/23 1243   SpO2 98 % 03/02/23 1243         Event Time   Out of Recovery 12:54:00         Pain/Doug Score: Doug Score: 10 (3/2/2023 12:45 PM)

## 2023-03-02 NOTE — DISCHARGE SUMMARY
[facility] - Endoscopy  Discharge Note  Short Stay  Discharge Note  Short Stay      SUMMARY     Admit Date: 3/2/2023    Attending Physician: Doroteo Kahn MD     Discharge Physician: Doroteo Kahn MD    Discharge Date: 3/2/2023 12:37 PM    Final Diagnosis: Diverticulitis [K57.92]    Disposition: HOME OR SELF CARE    Patient Instructions:   Current Discharge Medication List        CONTINUE these medications which have NOT CHANGED    Details   buPROPion (WELLBUTRIN XL) 150 MG TB24 tablet Take 2 tablets (300 mg total) by mouth once daily.  Qty: 180 tablet, Refills: 1      lisdexamfetamine (VYVANSE) 40 MG Cap Take 1 capsule (40 mg total) by mouth once daily.  Qty: 30 capsule, Refills: 0    Associated Diagnoses: Attention deficit hyperactivity disorder (ADHD), combined type             Discharge Procedure Orders (must include Diet, Follow-up, Activity)    Follow Up:  Follow up with PCP as previously scheduled  Resume routine diet.  Activity as tolerated.    No driving day of procedure.

## 2023-03-02 NOTE — ANESTHESIA PREPROCEDURE EVALUATION
03/02/2023  Hayley Montana is a 53 y.o., female.    Pre-op Assessment    I have reviewed the Patient Summary Reports.    I have reviewed the Nursing Notes. I have reviewed the NPO Status.   I have reviewed the Medications.     Review of Systems  Anesthesia Hx:  No problems with previous Anesthesia  Denies Family Hx of Anesthesia complications.  Personal Hx of Anesthesia complications Slow To Awaken/Delayed Emergence   Social:  Non-Smoker    Hematology/Oncology:  Hematology Normal   Oncology Normal     EENT/Dental:EENT/Dental Normal   Cardiovascular:  Cardiovascular Normal     Pulmonary:  Pulmonary Normal    Renal/:  Renal/ Normal     Hepatic/GI:   Bowel Prep.    Musculoskeletal:  Musculoskeletal Normal    Neurological:  Neurology Normal    Endocrine:  Endocrine Normal    Dermatological:  Skin Normal    Psych:   anxiety          Physical Exam  General:  Well nourished      Airway/Jaw/Neck:  Airway Findings: Mouth Opening: Normal   Mallampati: II     Dental:  Dental Findings: In tact          Mental Status:  Mental Status Findings:  Cooperative, Alert and Oriented         Anesthesia Plan  Type of Anesthesia, risks & benefits discussed:  Anesthesia Type:  general    Patient's Preference:   Plan Factors:          Intra-op Monitoring Plan: standard ASA monitors  Intra-op Monitoring Plan Comments:   Post Op Pain Control Plan:   Post Op Pain Control Plan Comments:     Induction:   IV  Beta Blocker:         Informed Consent: Informed consent signed with the Patient and all parties understand the risks and agree with anesthesia plan.  All questions answered.  Anesthesia consent signed with patient.  ASA Score: 1     Day of Surgery Review of History & Physical:              Ready For Surgery From Anesthesia Perspective.           Physical Exam  General: Well nourished    Airway:  Mallampati: II   Mouth Opening:  Normal    Dental:  In tact          Anesthesia Plan  Type of Anesthesia, risks & benefits discussed:    Anesthesia Type: general  Intra-op Monitoring Plan: standard ASA monitors  Induction:  IV  Informed Consent: Informed consent signed with the Patient and all parties understand the risks and agree with anesthesia plan.  All questions answered.   ASA Score: 1    Ready For Surgery From Anesthesia Perspective.       .

## 2023-03-02 NOTE — TRANSFER OF CARE
"Anesthesia Transfer of Care Note    Patient: Hayley Montana    Procedure(s) Performed: Procedure(s) (LRB):  COLONOSCOPY (N/A)    Patient location: PACU    Anesthesia Type: general    Transport from OR: Transported from OR on room air with adequate spontaneous ventilation    Post pain: adequate analgesia    Post assessment: no apparent anesthetic complications and tolerated procedure well    Post vital signs: stable    Level of consciousness: sedated and awake    Nausea/Vomiting: no nausea/vomiting    Complications: none    Transfer of care protocol was followed      Last vitals:   Visit Vitals  /70 (BP Location: Right arm, Patient Position: Sitting)   Pulse 65   Temp 36.5 °C (97.7 °F) (Skin)   Resp 17   Ht 5' 8" (1.727 m)   Wt 88.5 kg (195 lb)   LMP 11/07/2016   SpO2 97%   Breastfeeding No   BMI 29.65 kg/m²     "

## 2023-03-02 NOTE — H&P
History & Physical - Short Stay  Gastroenterology      SUBJECTIVE:     Procedure: Colonoscopy    Chief Complaint/Indication for Procedure: Abnormal CT    PTA Medications   Medication Sig    buPROPion (WELLBUTRIN XL) 150 MG TB24 tablet Take 2 tablets (300 mg total) by mouth once daily.    lisdexamfetamine (VYVANSE) 40 MG Cap Take 1 capsule (40 mg total) by mouth once daily.       Review of patient's allergies indicates:  No Known Allergies     Past Medical History:   Diagnosis Date    ADHD (attention deficit hyperactivity disorder)     Anxiety     Complete rotator cuff tear or rupture of right shoulder, not specified as traumatic 2018    Depression     General anesthetics causing adverse effect in therapeutic use     delayed recovery     Past Surgical History:   Procedure Laterality Date     SECTION      x2    COLONOSCOPY  2018    Colonoscopy Dr. Wolf Marsh-- Recomended colonoscopy in 10 years.     COSMETIC SURGERY      breast lift tummy tuck    HYSTERECTOMY      Indication: Pain    SHOULDER ARTHROSCOPY      TONSILLECTOMY      TOTAL REDUCTION MAMMOPLASTY Bilateral 2006    UPPER GASTROINTESTINAL ENDOSCOPY  2018    biopsy report in procedures     Family History   Problem Relation Age of Onset    Neuropathy Mother     Hypertension Mother     Dementia Mother     Pancreatic cancer Father     Diabetes Father     Thyroid disease Sister     Hernia Sister     Scleroderma Sister     No Known Problems Sister     Diabetes Brother     No Known Problems Daughter     Asthma Son     ADD / ADHD Son     Colon cancer Neg Hx     Crohn's disease Neg Hx     Esophageal cancer Neg Hx     Stomach cancer Neg Hx     Ulcerative colitis Neg Hx      Social History     Tobacco Use    Smoking status: Never    Smokeless tobacco: Never   Substance Use Topics    Alcohol use: Yes     Comment: social- not on a weekly basis    Drug use: No         OBJECTIVE:     Vital Signs (Most Recent)  Temp: 97.7 °F (36.5 °C) (23  1206)  Pulse: 65 (03/02/23 1206)  Resp: 17 (03/02/23 1206)  BP: 133/70 (03/02/23 1206)  SpO2: 97 % (03/02/23 1206)    Physical Exam:                                                       GENERAL:  Comfortable, in no acute distress.                                 HEENT EXAM:  Nonicteric.  No adenopathy.  Oropharynx is clear.               NECK:  Supple.                                                               LUNGS:  Clear.                                                               CARDIAC:  Regular rate and rhythm.  S1, S2.  No murmur.                      ABDOMEN:  Soft, positive bowel sounds, nontender.  No hepatosplenomegaly or masses.  No rebound or guarding.                                             EXTREMITIES:  No edema.     MENTAL STATUS:  Normal, alert and oriented.      ASSESSMENT/PLAN:     Assessment: Abnormal CT    Plan: Colonoscopy    Anesthesia Plan: General    ASA Grade: ASA 2 - Patient with mild systemic disease with no functional limitations    MALLAMPATI SCORE:  I (soft palate, uvula, fauces, and tonsillar pillars visible)    ]

## 2023-03-03 VITALS
HEART RATE: 68 BPM | SYSTOLIC BLOOD PRESSURE: 109 MMHG | HEIGHT: 68 IN | BODY MASS INDEX: 29.55 KG/M2 | WEIGHT: 195 LBS | DIASTOLIC BLOOD PRESSURE: 58 MMHG | OXYGEN SATURATION: 98 % | TEMPERATURE: 98 F | RESPIRATION RATE: 17 BRPM

## 2023-03-22 ENCOUNTER — PATIENT MESSAGE (OUTPATIENT)
Dept: ALLERGY | Facility: CLINIC | Age: 53
End: 2023-03-22
Payer: COMMERCIAL

## 2023-03-22 ENCOUNTER — PATIENT MESSAGE (OUTPATIENT)
Dept: PRIMARY CARE CLINIC | Facility: CLINIC | Age: 53
End: 2023-03-22
Payer: COMMERCIAL

## 2023-03-30 ENCOUNTER — OFFICE VISIT (OUTPATIENT)
Dept: ALLERGY | Facility: CLINIC | Age: 53
End: 2023-03-30
Payer: COMMERCIAL

## 2023-03-30 ENCOUNTER — LAB VISIT (OUTPATIENT)
Dept: LAB | Facility: HOSPITAL | Age: 53
End: 2023-03-30
Payer: COMMERCIAL

## 2023-03-30 VITALS
HEART RATE: 71 BPM | BODY MASS INDEX: 29.93 KG/M2 | SYSTOLIC BLOOD PRESSURE: 137 MMHG | OXYGEN SATURATION: 99 % | DIASTOLIC BLOOD PRESSURE: 65 MMHG | WEIGHT: 196.88 LBS

## 2023-03-30 DIAGNOSIS — Z77.120 MOLD EXPOSURE: Primary | ICD-10-CM

## 2023-03-30 DIAGNOSIS — Z77.120 MOLD EXPOSURE: ICD-10-CM

## 2023-03-30 PROCEDURE — 99214 OFFICE O/P EST MOD 30 MIN: CPT | Mod: S$GLB,,, | Performed by: STUDENT IN AN ORGANIZED HEALTH CARE EDUCATION/TRAINING PROGRAM

## 2023-03-30 PROCEDURE — 3075F SYST BP GE 130 - 139MM HG: CPT | Mod: CPTII,S$GLB,, | Performed by: STUDENT IN AN ORGANIZED HEALTH CARE EDUCATION/TRAINING PROGRAM

## 2023-03-30 PROCEDURE — 1159F MED LIST DOCD IN RCRD: CPT | Mod: CPTII,S$GLB,, | Performed by: STUDENT IN AN ORGANIZED HEALTH CARE EDUCATION/TRAINING PROGRAM

## 2023-03-30 PROCEDURE — 99999 PR PBB SHADOW E&M-EST. PATIENT-LVL III: CPT | Mod: PBBFAC,,, | Performed by: STUDENT IN AN ORGANIZED HEALTH CARE EDUCATION/TRAINING PROGRAM

## 2023-03-30 PROCEDURE — 3008F PR BODY MASS INDEX (BMI) DOCUMENTED: ICD-10-PCS | Mod: CPTII,S$GLB,, | Performed by: STUDENT IN AN ORGANIZED HEALTH CARE EDUCATION/TRAINING PROGRAM

## 2023-03-30 PROCEDURE — 3078F PR MOST RECENT DIASTOLIC BLOOD PRESSURE < 80 MM HG: ICD-10-PCS | Mod: CPTII,S$GLB,, | Performed by: STUDENT IN AN ORGANIZED HEALTH CARE EDUCATION/TRAINING PROGRAM

## 2023-03-30 PROCEDURE — 30000890 MISCELLANEOUS SENDOUT TEST, BLOOD: Performed by: STUDENT IN AN ORGANIZED HEALTH CARE EDUCATION/TRAINING PROGRAM

## 2023-03-30 PROCEDURE — 1159F PR MEDICATION LIST DOCUMENTED IN MEDICAL RECORD: ICD-10-PCS | Mod: CPTII,S$GLB,, | Performed by: STUDENT IN AN ORGANIZED HEALTH CARE EDUCATION/TRAINING PROGRAM

## 2023-03-30 PROCEDURE — 3008F BODY MASS INDEX DOCD: CPT | Mod: CPTII,S$GLB,, | Performed by: STUDENT IN AN ORGANIZED HEALTH CARE EDUCATION/TRAINING PROGRAM

## 2023-03-30 PROCEDURE — 36415 COLL VENOUS BLD VENIPUNCTURE: CPT | Performed by: STUDENT IN AN ORGANIZED HEALTH CARE EDUCATION/TRAINING PROGRAM

## 2023-03-30 PROCEDURE — 86001 ALLERGEN SPECIFIC IGG: CPT | Performed by: STUDENT IN AN ORGANIZED HEALTH CARE EDUCATION/TRAINING PROGRAM

## 2023-03-30 PROCEDURE — 86003 ALLG SPEC IGE CRUDE XTRC EA: CPT | Performed by: STUDENT IN AN ORGANIZED HEALTH CARE EDUCATION/TRAINING PROGRAM

## 2023-03-30 PROCEDURE — 99214 PR OFFICE/OUTPT VISIT, EST, LEVL IV, 30-39 MIN: ICD-10-PCS | Mod: S$GLB,,, | Performed by: STUDENT IN AN ORGANIZED HEALTH CARE EDUCATION/TRAINING PROGRAM

## 2023-03-30 PROCEDURE — 99999 PR PBB SHADOW E&M-EST. PATIENT-LVL III: ICD-10-PCS | Mod: PBBFAC,,, | Performed by: STUDENT IN AN ORGANIZED HEALTH CARE EDUCATION/TRAINING PROGRAM

## 2023-03-30 PROCEDURE — 3075F PR MOST RECENT SYSTOLIC BLOOD PRESS GE 130-139MM HG: ICD-10-PCS | Mod: CPTII,S$GLB,, | Performed by: STUDENT IN AN ORGANIZED HEALTH CARE EDUCATION/TRAINING PROGRAM

## 2023-03-30 PROCEDURE — 3078F DIAST BP <80 MM HG: CPT | Mod: CPTII,S$GLB,, | Performed by: STUDENT IN AN ORGANIZED HEALTH CARE EDUCATION/TRAINING PROGRAM

## 2023-03-30 NOTE — PROGRESS NOTES
"Allergy Clinic Note  [facility]    This note was created by combination of typed  and M-Modal dictation. Transcription errors may be present.  If there are any questions, please contact me.    Subjective:      Patient ID: Hayley Montana is a 53 y.o. female.    Chief Complaint: Allergies        Allergy problem list:    Mold exposure    History of Present Illness: Hayley Montana is a 53 y.o. female    3/30/23:  Reports than memory (word finding especially) improved shortly after moving to a new condo unit.  Reports "yellow spots" on doors and door frames in new unit, which cleared with cleaning.  When she noted a return of her memory difficulties, she also noticed a return in yellow spots.  Mold evaluation was within normal limits.  Discussed lack of recognition of mold toxicity by traditional western medicine.  Discussed that it is possible, nonetheless, to test for significant exposure to a particular mold (Chaetomium) by IgG exposure testing.  If this is normal, it essentially rules out mold as the cause of any illness.  If it is positive, it does not prove that any sx/illness is related to mold, only that she has had significant exposure.  Briefly discussed alternative modalities.    9/7/2022:  At initial visit complained of memory problems for the last several months.  She is concerned that this is due to copious mold that has been found in her condo.  She is currently not living in a moldy area.  She denies any upper or lower respiratory symptoms.  She also denies any other cognitive symptoms.  IgG mold exposure testing ordered.  In January 2023, Pt contacted me via portal requesting testing requesting testing specifically for Chaetomium which had been found in her unit.  Ordered Chaetomium IgE and IgG not yet drawn.      MEDICAL HISTORY      Significant past medical history:  ADHD, anxiety, depression  ENT surgery:  Tonsillectomy   Significant family history: son with EIB, no " rhinitis  Exposures: 1 cat (in bed), mold as above (and temporarily environmental smoke)  Smoking Hx:  Client  reports that she has never smoked. She has never been exposed to tobacco smoke. She has never used smokeless tobacco.      Asthma: No  Eczema: No  Food allergy:  No  Drug allergy/intolerance: No  Venom allergy: denies  Latex allergy:  denies    Patient Active Problem List   Diagnosis    Attention deficit hyperactivity disorder (ADHD), combined type    Anxiety    History of herpes labialis    Irritable bowel syndrome with diarrhea    Low grade squamous intraepithelial lesion (LGSIL) on cervical Pap smear    Impaired fasting glucose    Venous insufficiency    Forgetfulness    Encounter for screening mammogram for malignant neoplasm of breast    Back pain    Skin lesion     Medication List with Changes/Refills   Current Medications    BUPROPION (WELLBUTRIN XL) 150 MG TB24 TABLET    Take 2 tablets (300 mg total) by mouth once daily.       Start Date: 3/10/2023 End Date: 3/9/2024    LISDEXAMFETAMINE (VYVANSE) 40 MG CAP    Take 1 capsule (40 mg total) by mouth once daily.       Start Date: 3/10/2023 End Date: --           REVIEW OF SYSTEMS      CONST: no F/C/NS, no unintentional weight changes  NEURO:  no tremor, no weakness  EYES: no discharge, no pruritus, no erythema  EARS: no hearing loss, no sensation of fullness  NOSE: no congestion, no rhinorrhea, no sneezing  PULM:  no SOB, no wheezing, no cough  CV: no CP, no palpitations, no leg swelling  GI:  no abdominal pain, no blood in stool  :  no dysuria, no hematuria  DERM: no rashes, no skin breaks    Objective:     Physical Exam:     /65 (BP Location: Left arm, Patient Position: Sitting, BP Method: Large (Automatic))   Pulse 71   Wt 89.3 kg (196 lb 13.9 oz)   LMP 11/07/2016   SpO2 99%   BMI 29.93 kg/m²   GEN: Awake and alert, no distress  DERM: No rashes or flushing  EYE:  No occular discharge  HEENT: No nasal discharge, no hoarseness  PULM:  Normal work of breathing, no cough  NEURO:  No focal deficit, speech fluent and logical  PSYCH: appropriate affect, normal behavior        Allergy Testing            Lab results            Imaging and other diagnostics            Medical records review          ASSESSMENT & PLAN       Diagnoses:     Hayley Montana is a 53 y.o. female. with  1. Mold exposure            Medical Decision making:   Pt is presenting with problems with short term memory which could be related to lack of attention or concentration.  With no other symptoms, I do not suspect the mold exposure is causal but understand the need to rule it out.  Plan IgG (exposure) testing to Chaetomium, the mold that was found in her previous condo unit.      Mold exposure            Patient Instructions:     There are no Patient Instructions on file for this visit.    No follow-ups on file.        Brittany Francis MD  Allergy, Asthma and Immunology      I spent a total of  33 minutes on the day of the visit.This includes face to face time and non-face to face time preparing to see the patient (eg, review of tests), obtaining and/or reviewing separately obtained history, documenting clinical information in the electronic or other health record, independently interpreting results and communicating results to the patient/family/caregiver, or care coordinator.

## 2023-04-03 LAB
ALLERGEN CHAETOMIUM GLOBOSUM IGE: <0.1 KU/L
CHAETOMIUM GLOB. CLASS: NORMAL

## 2023-04-04 LAB
MISCELLANEOUS TEST NAME: NORMAL
REFERENCE LAB: NORMAL
SPECIMEN TYPE: NORMAL
TEST RESULT: NORMAL

## 2023-04-10 ENCOUNTER — PATIENT MESSAGE (OUTPATIENT)
Dept: OTOLARYNGOLOGY | Facility: CLINIC | Age: 53
End: 2023-04-10
Payer: COMMERCIAL

## 2023-05-10 DIAGNOSIS — F90.2 ATTENTION DEFICIT HYPERACTIVITY DISORDER (ADHD), COMBINED TYPE: ICD-10-CM

## 2023-05-10 NOTE — TELEPHONE ENCOUNTER
No care due was identified.  [facility] Embedded Care Due Messages. Reference number: [id number].   5/10/2023 5:25:45 AM CDT

## 2023-05-11 RX ORDER — LISDEXAMFETAMINE DIMESYLATE 40 MG/1
40 CAPSULE ORAL DAILY
Qty: 30 CAPSULE | Refills: 0 | Status: SHIPPED | OUTPATIENT
Start: 2023-05-11 | End: 2023-06-13 | Stop reason: SDUPTHER

## 2023-06-13 ENCOUNTER — LAB VISIT (OUTPATIENT)
Dept: LAB | Facility: HOSPITAL | Age: 53
End: 2023-06-13
Attending: INTERNAL MEDICINE
Payer: COMMERCIAL

## 2023-06-13 ENCOUNTER — OFFICE VISIT (OUTPATIENT)
Dept: PRIMARY CARE CLINIC | Facility: CLINIC | Age: 53
End: 2023-06-13
Payer: COMMERCIAL

## 2023-06-13 VITALS
HEIGHT: 68 IN | SYSTOLIC BLOOD PRESSURE: 132 MMHG | HEART RATE: 63 BPM | WEIGHT: 194 LBS | BODY MASS INDEX: 29.4 KG/M2 | OXYGEN SATURATION: 100 % | DIASTOLIC BLOOD PRESSURE: 88 MMHG

## 2023-06-13 DIAGNOSIS — R73.01 IMPAIRED FASTING GLUCOSE: ICD-10-CM

## 2023-06-13 DIAGNOSIS — F90.2 ATTENTION DEFICIT HYPERACTIVITY DISORDER (ADHD), COMBINED TYPE: ICD-10-CM

## 2023-06-13 DIAGNOSIS — Z00.00 WELLNESS EXAMINATION: ICD-10-CM

## 2023-06-13 DIAGNOSIS — Z00.00 WELLNESS EXAMINATION: Primary | ICD-10-CM

## 2023-06-13 LAB
ALBUMIN SERPL BCP-MCNC: 4 G/DL (ref 3.5–5.2)
ALP SERPL-CCNC: 78 U/L (ref 55–135)
ALT SERPL W/O P-5'-P-CCNC: 9 U/L (ref 10–44)
ANION GAP SERPL CALC-SCNC: 9 MMOL/L (ref 8–16)
AST SERPL-CCNC: 13 U/L (ref 10–40)
BILIRUB SERPL-MCNC: 0.3 MG/DL (ref 0.1–1)
BUN SERPL-MCNC: 12 MG/DL (ref 6–20)
CALCIUM SERPL-MCNC: 9.5 MG/DL (ref 8.7–10.5)
CHLORIDE SERPL-SCNC: 105 MMOL/L (ref 95–110)
CHOLEST SERPL-MCNC: 160 MG/DL (ref 120–199)
CHOLEST/HDLC SERPL: 2.7 {RATIO} (ref 2–5)
CO2 SERPL-SCNC: 28 MMOL/L (ref 23–29)
CREAT SERPL-MCNC: 0.7 MG/DL (ref 0.5–1.4)
ERYTHROCYTE [DISTWIDTH] IN BLOOD BY AUTOMATED COUNT: 11.9 % (ref 11.5–14.5)
EST. GFR  (NO RACE VARIABLE): >60 ML/MIN/1.73 M^2
ESTIMATED AVG GLUCOSE: 100 MG/DL (ref 68–131)
GLUCOSE SERPL-MCNC: 97 MG/DL (ref 70–110)
HBA1C MFR BLD: 5.1 % (ref 4–5.6)
HCT VFR BLD AUTO: 42.7 % (ref 37–48.5)
HDLC SERPL-MCNC: 59 MG/DL (ref 40–75)
HDLC SERPL: 36.9 % (ref 20–50)
HGB BLD-MCNC: 13.6 G/DL (ref 12–16)
LDLC SERPL CALC-MCNC: 89.8 MG/DL (ref 63–159)
MCH RBC QN AUTO: 31 PG (ref 27–31)
MCHC RBC AUTO-ENTMCNC: 31.9 G/DL (ref 32–36)
MCV RBC AUTO: 97 FL (ref 82–98)
NONHDLC SERPL-MCNC: 101 MG/DL
PLATELET # BLD AUTO: 306 K/UL (ref 150–450)
PMV BLD AUTO: 9.6 FL (ref 9.2–12.9)
POTASSIUM SERPL-SCNC: 4.6 MMOL/L (ref 3.5–5.1)
PROT SERPL-MCNC: 7.2 G/DL (ref 6–8.4)
RBC # BLD AUTO: 4.39 M/UL (ref 4–5.4)
SODIUM SERPL-SCNC: 142 MMOL/L (ref 136–145)
TRIGL SERPL-MCNC: 56 MG/DL (ref 30–150)
WBC # BLD AUTO: 5.47 K/UL (ref 3.9–12.7)

## 2023-06-13 PROCEDURE — 3079F PR MOST RECENT DIASTOLIC BLOOD PRESSURE 80-89 MM HG: ICD-10-PCS | Mod: CPTII,S$GLB,, | Performed by: INTERNAL MEDICINE

## 2023-06-13 PROCEDURE — 3008F PR BODY MASS INDEX (BMI) DOCUMENTED: ICD-10-PCS | Mod: CPTII,S$GLB,, | Performed by: INTERNAL MEDICINE

## 2023-06-13 PROCEDURE — 99396 PREV VISIT EST AGE 40-64: CPT | Mod: S$GLB,,, | Performed by: INTERNAL MEDICINE

## 2023-06-13 PROCEDURE — 3008F BODY MASS INDEX DOCD: CPT | Mod: CPTII,S$GLB,, | Performed by: INTERNAL MEDICINE

## 2023-06-13 PROCEDURE — 99999 PR PBB SHADOW E&M-EST. PATIENT-LVL III: CPT | Mod: PBBFAC,,, | Performed by: INTERNAL MEDICINE

## 2023-06-13 PROCEDURE — 36415 COLL VENOUS BLD VENIPUNCTURE: CPT | Performed by: INTERNAL MEDICINE

## 2023-06-13 PROCEDURE — 3075F PR MOST RECENT SYSTOLIC BLOOD PRESS GE 130-139MM HG: ICD-10-PCS | Mod: CPTII,S$GLB,, | Performed by: INTERNAL MEDICINE

## 2023-06-13 PROCEDURE — 3079F DIAST BP 80-89 MM HG: CPT | Mod: CPTII,S$GLB,, | Performed by: INTERNAL MEDICINE

## 2023-06-13 PROCEDURE — 85027 COMPLETE CBC AUTOMATED: CPT | Performed by: INTERNAL MEDICINE

## 2023-06-13 PROCEDURE — 83036 HEMOGLOBIN GLYCOSYLATED A1C: CPT | Performed by: INTERNAL MEDICINE

## 2023-06-13 PROCEDURE — 3075F SYST BP GE 130 - 139MM HG: CPT | Mod: CPTII,S$GLB,, | Performed by: INTERNAL MEDICINE

## 2023-06-13 PROCEDURE — 80061 LIPID PANEL: CPT | Performed by: INTERNAL MEDICINE

## 2023-06-13 PROCEDURE — 99396 PR PREVENTIVE VISIT,EST,40-64: ICD-10-PCS | Mod: S$GLB,,, | Performed by: INTERNAL MEDICINE

## 2023-06-13 PROCEDURE — 1159F MED LIST DOCD IN RCRD: CPT | Mod: CPTII,S$GLB,, | Performed by: INTERNAL MEDICINE

## 2023-06-13 PROCEDURE — 99999 PR PBB SHADOW E&M-EST. PATIENT-LVL III: ICD-10-PCS | Mod: PBBFAC,,, | Performed by: INTERNAL MEDICINE

## 2023-06-13 PROCEDURE — 80053 COMPREHEN METABOLIC PANEL: CPT | Performed by: INTERNAL MEDICINE

## 2023-06-13 PROCEDURE — 1160F PR REVIEW ALL MEDS BY PRESCRIBER/CLIN PHARMACIST DOCUMENTED: ICD-10-PCS | Mod: CPTII,S$GLB,, | Performed by: INTERNAL MEDICINE

## 2023-06-13 PROCEDURE — 1159F PR MEDICATION LIST DOCUMENTED IN MEDICAL RECORD: ICD-10-PCS | Mod: CPTII,S$GLB,, | Performed by: INTERNAL MEDICINE

## 2023-06-13 PROCEDURE — 1160F RVW MEDS BY RX/DR IN RCRD: CPT | Mod: CPTII,S$GLB,, | Performed by: INTERNAL MEDICINE

## 2023-06-13 RX ORDER — LISDEXAMFETAMINE DIMESYLATE 40 MG/1
40 CAPSULE ORAL DAILY
Qty: 30 CAPSULE | Refills: 0 | Status: SHIPPED | OUTPATIENT
Start: 2023-06-13 | End: 2023-07-18 | Stop reason: SDUPTHER

## 2023-06-13 NOTE — PROGRESS NOTES
[facility]    Chief Complaint      Chief Complaint   Patient presents with    Follow-up     History of Present Illness      Hayley Montana is a 53 y.o. female who presents today for chief complaint follow up ADHD and .     HPI   She feels well today   Seen in ed 3.23 diverticulitis, she says she eats a lot of nuts, she had follow up cscope in 3.  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 She is back at home and it has been cleaned, feels mental status has cleared.   Last seen by me 9.22  Adhd had been on vyvanse  Mood is ok on wellbutrin   She is still working on her book, she is still doing netowrking etc   Active Problem List with Overview Notes    Diagnosis Date Noted    Forgetfulness 2021    Encounter for screening mammogram for malignant neoplasm of breast 2021    Back pain 2021    Skin lesion 2021    Venous insufficiency 2021    Low grade squamous intraepithelial lesion (LGSIL) on cervical Pap smear 2020     - 10/2019 PAP by Dr. Lance  - Dr. Lance following      Impaired fasting glucose 2020    History of herpes labialis 2019    Irritable bowel syndrome with diarrhea 2019    Attention deficit hyperactivity disorder (ADHD), combined type 2019    Anxiety 2019       Health Maintenance   Topic Date Due    Mammogram  2025    Lipid Panel  2026    TETANUS VACCINE  2029    Hepatitis C Screening  Completed       Past Medical History:   Diagnosis Date    ADHD (attention deficit hyperactivity disorder)     Anxiety     Complete rotator cuff tear or rupture of right shoulder, not specified as traumatic 2018    Depression     General anesthetics causing adverse effect in therapeutic use     delayed recovery       Past Surgical History:   Procedure Laterality Date     SECTION      x2    COLONOSCOPY   08/06/2018    Colonoscopy Dr. Wolf Marsh-- Recomended colonoscopy in 10 years.     COLONOSCOPY N/A 3/2/2023    Procedure: COLONOSCOPY;  Surgeon: Doroteo Kahn MD;  Location: [facility];  Service: Endoscopy;  Laterality: N/A;    COSMETIC SURGERY      breast lift tummy tuck    HYSTERECTOMY  2010    Indication: Pain    SHOULDER ARTHROSCOPY      TONSILLECTOMY      TOTAL REDUCTION MAMMOPLASTY Bilateral 2006    UPPER GASTROINTESTINAL ENDOSCOPY  2018    biopsy report in procedures       family history includes ADD / ADHD in her son; Asthma in her son; Dementia in her mother; Diabetes in her brother and father; Hernia in her sister; Hypertension in her mother; Neuropathy in her mother; No Known Problems in her daughter and sister; Pancreatic cancer in her father; Scleroderma in her sister; Thyroid disease in her sister.    Social History     Tobacco Use    Smoking status: Never     Passive exposure: Never    Smokeless tobacco: Never   Substance Use Topics    Alcohol use: Yes     Comment: social- not on a weekly basis    Drug use: No       Review of Systems   Constitutional:  Negative for chills, fever, malaise/fatigue and weight loss.   Respiratory:  Negative for cough, sputum production, shortness of breath and wheezing.    Cardiovascular:  Negative for chest pain, palpitations, orthopnea and leg swelling.   Gastrointestinal:  Negative for constipation, diarrhea, nausea and vomiting.   Genitourinary:  Negative for dysuria, frequency, hematuria and urgency.      Outpatient Encounter Medications as of 6/13/2023   Medication Sig Dispense Refill    buPROPion (WELLBUTRIN XL) 150 MG TB24 tablet Take 2 tablets (300 mg total) by mouth once daily. 180 tablet 1    [DISCONTINUED] lisdexamfetamine (VYVANSE) 40 MG Cap Take 1 capsule (40 mg total) by mouth once daily. 30 capsule 0    lisdexamfetamine (VYVANSE) 40 MG Cap Take 1 capsule (40 mg total) by mouth once daily. 30 capsule 0     No facility-administered encounter  "medications on file as of 6/13/2023.        Review of patient's allergies indicates:  No Known Allergies        Physical Exam      Vital Signs  Pulse: 63  SpO2: 100 %  BP: 132/88  BP Location: Right arm  Patient Position: Sitting  Pain Score: 0-No pain  Height and Weight  Height: 5' 8" (172.7 cm)  Weight: 88 kg (194 lb 0.1 oz)  BSA (Calculated - sq m): 2.05 sq meters  BMI (Calculated): 29.5  Weight in (lb) to have BMI = 25: 164.1]    Physical Exam  Vitals reviewed.   Constitutional:       General: She is not in acute distress.     Appearance: She is well-developed. She is not diaphoretic.   HENT:      Head: Normocephalic and atraumatic.      Right Ear: External ear normal.      Left Ear: External ear normal.      Nose: Nose normal.   Eyes:      General:         Right eye: No discharge.         Left eye: No discharge.      Conjunctiva/sclera: Conjunctivae normal.   Cardiovascular:      Rate and Rhythm: Normal rate and regular rhythm.      Heart sounds: Normal heart sounds.   Pulmonary:      Effort: Pulmonary effort is normal. No respiratory distress.      Breath sounds: Normal breath sounds.   Musculoskeletal:         General: Normal range of motion.      Cervical back: Normal range of motion.   Skin:     Coloration: Skin is not pale.      Findings: No rash.   Neurological:      Mental Status: She is alert and oriented to person, place, and time.   Psychiatric:         Behavior: Behavior normal.         Thought Content: Thought content normal.        Laboratory:  CBC:  No results for input(s): WBC, RBC, HGB, HCT, PLT, MCV, MCH, MCHC in the last 2160 hours.  CMP:  No results for input(s): GLU, CALCIUM, ALBUMIN, PROT, NA, K, CO2, CL, BUN, ALKPHOS, ALT, AST, BILITOT in the last 2160 hours.    Invalid input(s): CREATININ  URINALYSIS:  No results for input(s): COLORU, CLARITYU, SPECGRAV, PHUR, PROTEINUA, GLUCOSEU, BILIRUBINCON, BLOODU, WBCU, RBCU, BACTERIA, MUCUS, NITRITE, LEUKOCYTESUR, UROBILINOGEN, HYALINECASTS in the " last 2160 hours.   LIPIDS:  No results for input(s): TSH, HDL, CHOL, TRIG, LDLCALC, CHOLHDL, NONHDLCHOL, TOTALCHOLEST in the last 2160 hours.  TSH:  No results for input(s): TSH in the last 2160 hours.  A1C:  No results for input(s): HGBA1C in the last 2160 hours.    Radiology:      Assessment/Plan     Hayley Montana is a 53 y.o.female with:    1. Wellness examination  -     Lipid Panel; Future; Expected date: 06/13/2023  -     Comprehensive Metabolic Panel; Future; Expected date: 06/13/2023  -     CBC Without Differential; Future; Expected date: 06/13/2023    2. Impaired fasting glucose  -     Hemoglobin A1C; Future; Expected date: 06/13/2023    3. Attention deficit hyperactivity disorder (ADHD), combined type  -     lisdexamfetamine (VYVANSE) 40 MG Cap; Take 1 capsule (40 mg total) by mouth once daily.  Dispense: 30 capsule; Refill: 0         Use of the [employer] Patient Portal discussed and encouraged during today's visit  -Continue current medications and maintain follow up with specialists.  Return to clinic in 3 months telemed .  Future Appointments   Date Time Provider Department Center   6/13/2023  2:00 PM Dea Tavarez MD [facility] [place]       Christen Hroton MD  6/13/2023 7:46 AM    Primary Care Internal Medicine

## 2023-06-20 ENCOUNTER — TELEPHONE (OUTPATIENT)
Dept: OBSTETRICS AND GYNECOLOGY | Facility: CLINIC | Age: 53
End: 2023-06-20
Payer: COMMERCIAL

## 2023-07-18 DIAGNOSIS — F90.2 ATTENTION DEFICIT HYPERACTIVITY DISORDER (ADHD), COMBINED TYPE: ICD-10-CM

## 2023-07-20 RX ORDER — LISDEXAMFETAMINE DIMESYLATE 40 MG/1
40 CAPSULE ORAL DAILY
Qty: 30 CAPSULE | Refills: 0 | Status: SHIPPED | OUTPATIENT
Start: 2023-07-20 | End: 2023-08-30 | Stop reason: SDUPTHER

## 2023-08-02 ENCOUNTER — PATIENT MESSAGE (OUTPATIENT)
Dept: OPTOMETRY | Facility: CLINIC | Age: 53
End: 2023-08-02
Payer: COMMERCIAL

## 2023-08-04 ENCOUNTER — OFFICE VISIT (OUTPATIENT)
Dept: OBSTETRICS AND GYNECOLOGY | Facility: CLINIC | Age: 53
End: 2023-08-04
Payer: COMMERCIAL

## 2023-08-04 VITALS
BODY MASS INDEX: 29.54 KG/M2 | WEIGHT: 194.88 LBS | SYSTOLIC BLOOD PRESSURE: 122 MMHG | HEIGHT: 68 IN | DIASTOLIC BLOOD PRESSURE: 78 MMHG

## 2023-08-04 DIAGNOSIS — Z11.51 SCREENING FOR HUMAN PAPILLOMAVIRUS (HPV): ICD-10-CM

## 2023-08-04 DIAGNOSIS — Z12.4 ENCOUNTER FOR PAPANICOLAOU SMEAR FOR CERVICAL CANCER SCREENING: ICD-10-CM

## 2023-08-04 DIAGNOSIS — Z01.419 ENCOUNTER FOR WELL WOMAN EXAM WITH ROUTINE GYNECOLOGICAL EXAM: Primary | ICD-10-CM

## 2023-08-04 PROCEDURE — 3044F HG A1C LEVEL LT 7.0%: CPT | Mod: CPTII,S$GLB,,

## 2023-08-04 PROCEDURE — 1159F PR MEDICATION LIST DOCUMENTED IN MEDICAL RECORD: ICD-10-PCS | Mod: CPTII,S$GLB,,

## 2023-08-04 PROCEDURE — 88141 PR  CYTOPATH CERV/VAG INTERPRET: ICD-10-PCS | Mod: ,,, | Performed by: PATHOLOGY

## 2023-08-04 PROCEDURE — 3078F PR MOST RECENT DIASTOLIC BLOOD PRESSURE < 80 MM HG: ICD-10-PCS | Mod: CPTII,S$GLB,,

## 2023-08-04 PROCEDURE — 88141 CYTOPATH C/V INTERPRET: CPT | Mod: ,,, | Performed by: PATHOLOGY

## 2023-08-04 PROCEDURE — 1159F MED LIST DOCD IN RCRD: CPT | Mod: CPTII,S$GLB,,

## 2023-08-04 PROCEDURE — 3074F PR MOST RECENT SYSTOLIC BLOOD PRESSURE < 130 MM HG: ICD-10-PCS | Mod: CPTII,S$GLB,,

## 2023-08-04 PROCEDURE — 99999 PR PBB SHADOW E&M-EST. PATIENT-LVL III: ICD-10-PCS | Mod: PBBFAC,,,

## 2023-08-04 PROCEDURE — 3078F DIAST BP <80 MM HG: CPT | Mod: CPTII,S$GLB,,

## 2023-08-04 PROCEDURE — 88175 CYTOPATH C/V AUTO FLUID REDO: CPT | Performed by: PATHOLOGY

## 2023-08-04 PROCEDURE — 99999 PR PBB SHADOW E&M-EST. PATIENT-LVL III: CPT | Mod: PBBFAC,,,

## 2023-08-04 PROCEDURE — 99396 PREV VISIT EST AGE 40-64: CPT | Mod: S$GLB,,,

## 2023-08-04 PROCEDURE — 99396 PR PREVENTIVE VISIT,EST,40-64: ICD-10-PCS | Mod: S$GLB,,,

## 2023-08-04 PROCEDURE — 3008F BODY MASS INDEX DOCD: CPT | Mod: CPTII,S$GLB,,

## 2023-08-04 PROCEDURE — 3008F PR BODY MASS INDEX (BMI) DOCUMENTED: ICD-10-PCS | Mod: CPTII,S$GLB,,

## 2023-08-04 PROCEDURE — 3044F PR MOST RECENT HEMOGLOBIN A1C LEVEL <7.0%: ICD-10-PCS | Mod: CPTII,S$GLB,,

## 2023-08-04 PROCEDURE — 87624 HPV HI-RISK TYP POOLED RSLT: CPT

## 2023-08-04 PROCEDURE — 3074F SYST BP LT 130 MM HG: CPT | Mod: CPTII,S$GLB,,

## 2023-08-04 RX ORDER — FLUTICASONE PROPIONATE 50 MCG
1 SPRAY, SUSPENSION (ML) NASAL 2 TIMES DAILY
COMMUNITY
Start: 2023-07-17

## 2023-08-04 NOTE — PROGRESS NOTES
"Chief Complaint: Well Woman Exam     HPI:      (New patient)    Hayley Montana is a 53 y.o.  who presents today for well woman exam.  LMP: Patient's last menstrual period was 2016.  Hx of partial hysterectomy secondary to pain (per Dr. Lance).  No issues, problems, or complaints. Specifically, patient denies abnormal vaginal bleeding, discharge, pelvic pain, urinary problems, or changes in appetite. Ms. Montana is not currently sexually active.     Previous Pap: NILM, HPV negative (10/23/2020)  Previous Mammogram: BiRads: 1 T-C Score: 5.13% (2023)   Most Recent Colonoscopy:  Diverticulosis  (2023), Repeat in     STD/STI Hx: Neg  Social: Wears seatbelts. Exercises. Feels safe at home.   Smoking status:  Never    FH:  Breast cancer: none  Colon cancer: none  Ovarian cancer: none  Endometrial cancer: none    Ms. Montana confirms that she wears her seatbelt when riding in the car and does not text while driving.     OB History          2    Para   2    Term   2            AB        Living   2         SAB        IAB        Ectopic        Multiple        Live Births   2           Obstetric Comments   Menarche at 12  Denies abnormal pap or STDs               ROS:     GENERAL: Denies unintentional weight gain or weight loss. Feeling well overall.   SKIN: Denies rash or lesions.   HEENT: Denies headaches, or vision changes.   CARDIOVASCULAR: Denies palpitations or chest pain.   RESPIRATORY: Denies shortness of breath or dyspnea on exertion.  BREASTS: Denies lumps or nipple discharge.   ABDOMEN: Denies constipation, diarrhea, nausea, vomiting, change in appetite.  URINARY: Denies frequency, dysuria.  NEUROLOGIC: Denies syncope or weakness.   PSYCHIATRIC: Denies uncontrolled depression or anxiety.    Physical Exam:      PHYSICAL EXAM:  /78   Ht 5' 8" (1.727 m)   Wt 88.4 kg (194 lb 14.2 oz)   LMP 2016   BMI 29.63 kg/m²   Body mass index is 29.63 kg/m².     APPEARANCE: Well " nourished, well developed, in no acute distress.  PSYCH: Appropriate mood and affect.  SKIN: No acne or hirsutism  NECK: Neck symmetric without masses  NODES: No inguinal, axillary, or supraclavicular lymph node enlargement  ABDOMEN: Soft.  No tenderness or masses.    CARDIOVASCULAR: No edema of peripheral extremities  BREASTS: Symmetrical, no visible skin lesions. No palpable masses. No nipple discharge bilaterally.  PELVIC: Normal external genitalia without lesions.  Normal hair distribution.  Adequate perineal body, normal urethral meatus.  Vagina moist and well rugated. Without lesions. Vagina without discharge.  Cervix pink, without lesions, discharge or tenderness.  No significant cystocele or rectocele.  Uterus surgical absent. Bimanual exam shows no midline or adnexal masses.      Assessment/Plan:     Encounter for well woman exam with routine gynecological exam  -     Counseled patient regarding healthy diet and regular exercise, daily multivitamin, daily seat belt use.   -     BP normotensive  -     She denies abuse and feels safe at home.  -     Pap smear:  Performed   -     Contraception:  N/A  -     STD screening:  declined   -     MMG:  UTD (next due 03/2024)  -     Colonoscopy: UTD    -     DEXA screening:  no indication due to age (<66y/o) and no additional risk factors e.g. previous fragility facture, glucocorticoid use, parental history of hip fracture, low body weight (<127 lbs), current cigarette use, excessive alcohol consumption, rheumatoid arthritis, secondary osteoporosis (malabsorption, inflammatory bowel disease, chronic liver disease).    Encounter for Papanicolaou smear for cervical cancer screening  -     Liquid-Based Pap Smear, Screening    Screening for human papillomavirus (HPV)  -     HPV High Risk Genotypes, PCR    Follow up in about 1 year for annual exam or sooner PRN.    Counseling:     Patient was counseled today on current ASCCP pap guidelines, the recommendation for yearly  physical exams, healthy diet and exercise routines, safe driving habits, and annual mammograms. She is to see her PCP for other health maintenance.     Use of the [employer] Patient Portal discussed and encouraged during today's visit.   Counseling time: 15 minutes    Yohana Capellan (Maggie), SAW  Obstetrics and Gynecology  [facility]

## 2023-08-13 ENCOUNTER — PATIENT MESSAGE (OUTPATIENT)
Dept: OBSTETRICS AND GYNECOLOGY | Facility: CLINIC | Age: 53
End: 2023-08-13
Payer: COMMERCIAL

## 2023-08-14 LAB
FINAL PATHOLOGIC DIAGNOSIS: ABNORMAL
Lab: ABNORMAL

## 2023-08-30 DIAGNOSIS — F90.2 ATTENTION DEFICIT HYPERACTIVITY DISORDER (ADHD), COMBINED TYPE: ICD-10-CM

## 2023-08-31 RX ORDER — LISDEXAMFETAMINE DIMESYLATE 40 MG/1
40 CAPSULE ORAL DAILY
Qty: 30 CAPSULE | Refills: 0 | Status: SHIPPED | OUTPATIENT
Start: 2023-08-31 | End: 2023-09-13 | Stop reason: SDUPTHER

## 2023-09-13 ENCOUNTER — TELEPHONE (OUTPATIENT)
Dept: PRIMARY CARE CLINIC | Facility: CLINIC | Age: 53
End: 2023-09-13

## 2023-09-13 ENCOUNTER — OFFICE VISIT (OUTPATIENT)
Dept: PRIMARY CARE CLINIC | Facility: CLINIC | Age: 53
End: 2023-09-13
Payer: COMMERCIAL

## 2023-09-13 DIAGNOSIS — F90.2 ATTENTION DEFICIT HYPERACTIVITY DISORDER (ADHD), COMBINED TYPE: ICD-10-CM

## 2023-09-13 PROCEDURE — 3044F HG A1C LEVEL LT 7.0%: CPT | Mod: CPTII,95,, | Performed by: INTERNAL MEDICINE

## 2023-09-13 PROCEDURE — 99213 PR OFFICE/OUTPT VISIT, EST, LEVL III, 20-29 MIN: ICD-10-PCS | Mod: 95,,, | Performed by: INTERNAL MEDICINE

## 2023-09-13 PROCEDURE — 3044F PR MOST RECENT HEMOGLOBIN A1C LEVEL <7.0%: ICD-10-PCS | Mod: CPTII,95,, | Performed by: INTERNAL MEDICINE

## 2023-09-13 PROCEDURE — 99213 OFFICE O/P EST LOW 20 MIN: CPT | Mod: 95,,, | Performed by: INTERNAL MEDICINE

## 2023-09-13 RX ORDER — LISDEXAMFETAMINE DIMESYLATE 40 MG/1
40 CAPSULE ORAL DAILY
Qty: 30 CAPSULE | Refills: 0 | Status: SHIPPED | OUTPATIENT
Start: 2023-09-13 | End: 2023-10-05 | Stop reason: SDUPTHER

## 2023-09-13 NOTE — PROGRESS NOTES
"[facility]  Internal Medicine Clinic Note  The patient location is: [place]  The chief complaint leading to consultation is: ADHD     Visit type: audiovisual    Face to Face time with patient: 10  10 minutes of total time spent on the encounter, which includes face to face time and non-face to face time preparing to see the patient (eg, review of tests), Obtaining and/or reviewing separately obtained history, Documenting clinical information in the electronic or other health record, Independently interpreting results (not separately reported) and communicating results to the patient/family/caregiver, or Care coordination (not separately reported).         Each patient to whom he or she provides medical services by telemedicine is:  (1) informed of the relationship between the physician and patient and the respective role of any other health care provider with respect to management of the patient; and (2) notified that he or she may decline to receive medical services by telemedicine and may withdraw from such care at any time.    Notes:     Chief Complaint    No chief complaint on file.    History of Present Illness      Hayley Montana is a 53 y.o. female who presents today for chief complaint adhd follow up.     HPI   She feels good  She is doing diet an exercise, she is doing weight watchers- she is having a good response, uses the nick she finds user friendly   Mood is good, sheis "on the up"  House is fully remediated   Only taking wellbutrin 150 qd  She needs vyvanse refill  reviewed   Active Problem List with Overview Notes    Diagnosis Date Noted    Forgetfulness 09/21/2021    Encounter for screening mammogram for malignant neoplasm of breast 09/21/2021    Back pain 09/21/2021    Skin lesion 09/21/2021    Venous insufficiency 04/26/2021    Low grade squamous intraepithelial lesion (LGSIL) on cervical Pap smear 01/07/2020     - 10/2019 PAP by Dr. Lance  - Dr. Lance following      Impaired fasting glucose 01/07/2020 "    History of herpes labialis 2019    Irritable bowel syndrome with diarrhea 2019    Attention deficit hyperactivity disorder (ADHD), combined type 2019    Anxiety 2019       Health Maintenance   Topic Date Due    Mammogram  2025    Lipid Panel  2028    TETANUS VACCINE  2029    Colorectal Cancer Screening  2033    Hepatitis C Screening  Completed    Shingles Vaccine  Completed       Past Medical History:   Diagnosis Date    ADHD (attention deficit hyperactivity disorder)     Anxiety     Complete rotator cuff tear or rupture of right shoulder, not specified as traumatic 2018    Depression     General anesthetics causing adverse effect in therapeutic use     delayed recovery       Past Surgical History:   Procedure Laterality Date     SECTION      x2    COLONOSCOPY  2018    Colonoscopy Dr. Wolf Marsh-- Recomended colonoscopy in 10 years.     COLONOSCOPY N/A 3/2/2023    Procedure: COLONOSCOPY;  Surgeon: Doroteo Kahn MD;  Location: [facility];  Service: Endoscopy;  Laterality: N/A;    COSMETIC SURGERY      breast lift tummy tuck    HYSTERECTOMY      Indication: Pain    SHOULDER ARTHROSCOPY      TONSILLECTOMY      TOTAL REDUCTION MAMMOPLASTY Bilateral     UPPER GASTROINTESTINAL ENDOSCOPY  2018    biopsy report in procedures       family history includes ADD / ADHD in her son; Asthma in her son; Dementia in her mother; Diabetes in her brother and father; Hernia in her sister; Hypertension in her mother; Neuropathy in her mother; No Known Problems in her daughter and sister; Pancreatic cancer in her father; Scleroderma in her sister; Thyroid disease in her sister.    Social History     Tobacco Use    Smoking status: Never     Passive exposure: Never    Smokeless tobacco: Never   Substance Use Topics    Alcohol use: Yes     Comment: social- not on a weekly basis    Drug use: No       Review of Systems   Constitutional:  Negative for chills,  fever, malaise/fatigue and weight loss.   HENT:  Negative for hearing loss.    Eyes:  Negative for discharge.   Respiratory:  Negative for cough, sputum production, shortness of breath and wheezing.    Cardiovascular:  Negative for chest pain, palpitations, orthopnea and leg swelling.   Gastrointestinal:  Negative for blood in stool, constipation, diarrhea, nausea and vomiting.   Genitourinary:  Negative for dysuria, frequency, hematuria and urgency.   Musculoskeletal:  Negative for neck pain.   Neurological:  Negative for weakness and headaches.   Endo/Heme/Allergies:  Negative for polydipsia.        Outpatient Encounter Medications as of 9/13/2023   Medication Sig Dispense Refill    buPROPion (WELLBUTRIN XL) 150 MG TB24 tablet Take 2 tablets (300 mg total) by mouth once daily. 180 tablet 1    fluticasone propionate (FLONASE) 50 mcg/actuation nasal spray 1 spray by Each Nostril route 2 (two) times daily.      lisdexamfetamine (VYVANSE) 40 MG Cap Take 1 capsule (40 mg total) by mouth once daily. 30 capsule 0    [DISCONTINUED] lisdexamfetamine (VYVANSE) 40 MG Cap Take 1 capsule (40 mg total) by mouth once daily. 30 capsule 0    [DISCONTINUED] lisdexamfetamine (VYVANSE) 40 MG Cap Take 1 capsule (40 mg total) by mouth once daily. 30 capsule 0     No facility-administered encounter medications on file as of 9/13/2023.        Review of patient's allergies indicates:  No Known Allergies        Physical Exam       ]    Physical Exam  Constitutional:       General: She is not in acute distress.     Appearance: She is well-developed. She is not diaphoretic.   HENT:      Head: Normocephalic and atraumatic.      Right Ear: External ear normal.      Left Ear: External ear normal.      Nose: Nose normal.   Eyes:      General:         Right eye: No discharge.         Left eye: No discharge.      Conjunctiva/sclera: Conjunctivae normal.   Pulmonary:      Effort: Pulmonary effort is normal. No respiratory distress.  "  Musculoskeletal:         General: Normal range of motion.      Cervical back: Normal range of motion.   Skin:     Coloration: Skin is not pale.      Findings: No rash.   Neurological:      Mental Status: She is alert and oriented to person, place, and time.   Psychiatric:         Behavior: Behavior normal.         Thought Content: Thought content normal.          Laboratory:  CBC:  No results for input(s): "WBC", "RBC", "HGB", "HCT", "PLT", "MCV", "MCH", "MCHC" in the last 2160 hours.  CMP:  No results for input(s): "GLU", "CALCIUM", "ALBUMIN", "PROT", "NA", "K", "CO2", "CL", "BUN", "ALKPHOS", "ALT", "AST", "BILITOT" in the last 2160 hours.    Invalid input(s): "CREATININ"  URINALYSIS:  No results for input(s): "COLORU", "CLARITYU", "SPECGRAV", "PHUR", "PROTEINUA", "GLUCOSEU", "BILIRUBINCON", "BLOODU", "WBCU", "RBCU", "BACTERIA", "MUCUS", "NITRITE", "LEUKOCYTESUR", "UROBILINOGEN", "HYALINECASTS" in the last 2160 hours.   LIPIDS:  No results for input(s): "TSH", "HDL", "CHOL", "TRIG", "LDLCALC", "CHOLHDL", "NONHDLCHOL", "TOTALCHOLEST" in the last 2160 hours.  TSH:  No results for input(s): "TSH" in the last 2160 hours.  A1C:  No results for input(s): "HGBA1C" in the last 2160 hours.    Radiology:      Assessment/Plan     Hayley Montana is a 53 y.o.female with:    1. Attention deficit hyperactivity disorder (ADHD), combined type  -     lisdexamfetamine (VYVANSE) 40 MG Cap; Take 1 capsule (40 mg total) by mouth once daily.  Dispense: 30 capsule; Refill: 0          Use of the [employer] Patient Portal discussed and encouraged during today's visit  -Continue current medications and maintain follow up with specialists.  Return to clinic in 3 months .  No future appointments.    Christen Horton MD  9/13/2023 7:46 AM    Primary Care Internal Medicine                     "

## 2023-10-05 DIAGNOSIS — F90.2 ATTENTION DEFICIT HYPERACTIVITY DISORDER (ADHD), COMBINED TYPE: ICD-10-CM

## 2023-10-05 NOTE — TELEPHONE ENCOUNTER
No care due was identified.  [facility] Embedded Care Due Messages. Reference number: [id number].   10/05/2023 8:29:48 AM CDT

## 2023-10-06 RX ORDER — LISDEXAMFETAMINE DIMESYLATE 40 MG/1
40 CAPSULE ORAL DAILY
Qty: 30 CAPSULE | Refills: 0 | Status: SHIPPED | OUTPATIENT
Start: 2023-10-06 | End: 2023-11-01 | Stop reason: SDUPTHER

## 2023-10-19 ENCOUNTER — PATIENT MESSAGE (OUTPATIENT)
Dept: GASTROENTEROLOGY | Facility: CLINIC | Age: 53
End: 2023-10-19
Payer: COMMERCIAL

## 2023-11-01 DIAGNOSIS — F90.2 ATTENTION DEFICIT HYPERACTIVITY DISORDER (ADHD), COMBINED TYPE: ICD-10-CM

## 2023-11-01 NOTE — TELEPHONE ENCOUNTER
No care due was identified.  [facility] Embedded Care Due Messages. Reference number: [id number].   11/01/2023 6:34:44 PM CDT

## 2023-11-02 ENCOUNTER — TELEPHONE (OUTPATIENT)
Dept: GASTROENTEROLOGY | Facility: CLINIC | Age: 53
End: 2023-11-02
Payer: COMMERCIAL

## 2023-11-02 RX ORDER — LISDEXAMFETAMINE DIMESYLATE 40 MG/1
40 CAPSULE ORAL DAILY
Qty: 30 CAPSULE | Refills: 0 | Status: SHIPPED | OUTPATIENT
Start: 2023-11-02 | End: 2023-12-07 | Stop reason: SDUPTHER

## 2023-11-02 RX ORDER — BUPROPION HYDROCHLORIDE 150 MG/1
300 TABLET ORAL DAILY
Qty: 180 TABLET | Refills: 1 | Status: SHIPPED | OUTPATIENT
Start: 2023-11-02 | End: 2024-11-01

## 2023-12-07 DIAGNOSIS — F90.2 ATTENTION DEFICIT HYPERACTIVITY DISORDER (ADHD), COMBINED TYPE: ICD-10-CM

## 2023-12-07 NOTE — TELEPHONE ENCOUNTER
No care due was identified.  Health [facility] Embedded Care Due Messages. Reference number: [id number].   12/07/2023 7:19:58 AM CST

## 2023-12-08 RX ORDER — LISDEXAMFETAMINE DIMESYLATE 40 MG/1
40 CAPSULE ORAL DAILY
Qty: 30 CAPSULE | Refills: 0 | Status: SHIPPED | OUTPATIENT
Start: 2023-12-08 | End: 2024-01-26 | Stop reason: SDUPTHER

## 2023-12-29 NOTE — TELEPHONE ENCOUNTER
Please resign order, not originally sent to a pharmacy.  Pharmacy verified.     Please see the attached refill request.

## 2024-01-26 DIAGNOSIS — F90.2 ATTENTION DEFICIT HYPERACTIVITY DISORDER (ADHD), COMBINED TYPE: ICD-10-CM

## 2024-01-26 RX ORDER — LISDEXAMFETAMINE DIMESYLATE 40 MG/1
40 CAPSULE ORAL DAILY
Qty: 30 CAPSULE | Refills: 0 | Status: SHIPPED | OUTPATIENT
Start: 2024-01-26 | End: 2024-03-15 | Stop reason: SDUPTHER

## 2024-01-26 NOTE — TELEPHONE ENCOUNTER
No care due was identified.  Health [facility] Embedded Care Due Messages. Reference number: [id number].   1/26/2024 7:47:13 AM CST

## 2024-03-15 DIAGNOSIS — F90.2 ATTENTION DEFICIT HYPERACTIVITY DISORDER (ADHD), COMBINED TYPE: ICD-10-CM

## 2024-03-15 NOTE — TELEPHONE ENCOUNTER
No care due was identified.  Health [facility] Embedded Care Due Messages. Reference number: [id number].   3/15/2024 9:44:52 AM CDT

## 2024-03-16 RX ORDER — LISDEXAMFETAMINE DIMESYLATE 40 MG/1
40 CAPSULE ORAL DAILY
Qty: 30 CAPSULE | Refills: 0 | Status: SHIPPED | OUTPATIENT
Start: 2024-03-16 | End: 2024-04-18 | Stop reason: SDUPTHER

## 2024-04-18 DIAGNOSIS — F90.2 ATTENTION DEFICIT HYPERACTIVITY DISORDER (ADHD), COMBINED TYPE: ICD-10-CM

## 2024-04-18 RX ORDER — LISDEXAMFETAMINE DIMESYLATE 40 MG/1
40 CAPSULE ORAL DAILY
Qty: 30 CAPSULE | Refills: 0 | Status: SHIPPED | OUTPATIENT
Start: 2024-04-18 | End: 2024-05-29 | Stop reason: SDUPTHER

## 2024-04-18 NOTE — TELEPHONE ENCOUNTER
No care due was identified.  [facility] Embedded Care Due Messages. Reference number: [id number].   4/18/2024 9:58:00 AM CDT

## 2024-05-23 ENCOUNTER — HOSPITAL ENCOUNTER (OUTPATIENT)
Dept: RADIOLOGY | Facility: HOSPITAL | Age: 54
Discharge: HOME | End: 2024-05-23
Attending: OBSTETRICS & GYNECOLOGY
Payer: COMMERCIAL

## 2024-05-23 DIAGNOSIS — Z12.31 ENCOUNTER FOR SCREENING MAMMOGRAM FOR MALIGNANT NEOPLASM OF BREAST: ICD-10-CM

## 2024-05-23 PROCEDURE — 77067 SCR MAMMO BI INCL CAD: CPT | Mod: 26,,, | Performed by: RADIOLOGY

## 2024-05-23 PROCEDURE — 77067 SCR MAMMO BI INCL CAD: CPT | Mod: TC,PO

## 2024-05-23 PROCEDURE — 77063 BREAST TOMOSYNTHESIS BI: CPT | Mod: 26,,, | Performed by: RADIOLOGY

## 2024-05-29 DIAGNOSIS — F90.2 ATTENTION DEFICIT HYPERACTIVITY DISORDER (ADHD), COMBINED TYPE: ICD-10-CM

## 2024-05-29 NOTE — TELEPHONE ENCOUNTER
No care due was identified.  [facility] Embedded Care Due Messages. Reference number: [id number].   5/29/2024 2:13:11 PM CDT

## 2024-05-30 RX ORDER — LISDEXAMFETAMINE DIMESYLATE 40 MG/1
40 CAPSULE ORAL DAILY
Qty: 30 CAPSULE | Refills: 0 | Status: SHIPPED | OUTPATIENT
Start: 2024-05-30 | End: 2024-06-14 | Stop reason: SDUPTHER

## 2024-06-14 DIAGNOSIS — F90.2 ATTENTION DEFICIT HYPERACTIVITY DISORDER (ADHD), COMBINED TYPE: ICD-10-CM

## 2024-06-14 RX ORDER — LISDEXAMFETAMINE DIMESYLATE 40 MG/1
40 CAPSULE ORAL DAILY
Qty: 30 CAPSULE | Refills: 0 | Status: SHIPPED | OUTPATIENT
Start: 2024-06-14

## 2024-06-14 NOTE — TELEPHONE ENCOUNTER
No care due was identified.  [facility] Embedded Care Due Messages. Reference number: [id number].   6/14/2024 11:39:49 AM CDT

## 2024-07-15 DIAGNOSIS — F90.2 ATTENTION DEFICIT HYPERACTIVITY DISORDER (ADHD), COMBINED TYPE: ICD-10-CM

## 2024-07-15 NOTE — TELEPHONE ENCOUNTER
No care due was identified.  [facility] Embedded Care Due Messages. Reference number: [id number].   7/15/2024 3:31:03 PM CDT

## 2024-07-17 RX ORDER — LISDEXAMFETAMINE DIMESYLATE 40 MG/1
40 CAPSULE ORAL DAILY
Qty: 30 CAPSULE | Refills: 0 | Status: SHIPPED | OUTPATIENT
Start: 2024-07-17

## 2024-08-11 DIAGNOSIS — F90.2 ATTENTION DEFICIT HYPERACTIVITY DISORDER (ADHD), COMBINED TYPE: ICD-10-CM

## 2024-08-11 NOTE — TELEPHONE ENCOUNTER
No care due was identified.  [facility] Embedded Care Due Messages. Reference number: [id number].   8/11/2024 5:23:35 PM CDT

## 2024-08-13 RX ORDER — LISDEXAMFETAMINE DIMESYLATE 40 MG/1
40 CAPSULE ORAL DAILY
Qty: 30 CAPSULE | Refills: 0 | Status: SHIPPED | OUTPATIENT
Start: 2024-08-18

## 2024-08-30 ENCOUNTER — OFFICE VISIT (OUTPATIENT)
Dept: PRIMARY CARE CLINIC | Facility: CLINIC | Age: 54
End: 2024-08-30
Payer: COMMERCIAL

## 2024-08-30 VITALS
HEIGHT: 68 IN | SYSTOLIC BLOOD PRESSURE: 120 MMHG | OXYGEN SATURATION: 100 % | WEIGHT: 187.19 LBS | BODY MASS INDEX: 28.37 KG/M2 | HEART RATE: 60 BPM | DIASTOLIC BLOOD PRESSURE: 80 MMHG

## 2024-08-30 DIAGNOSIS — Z00.00 WELLNESS EXAMINATION: Primary | ICD-10-CM

## 2024-08-30 DIAGNOSIS — F33.42 RECURRENT MAJOR DEPRESSIVE DISORDER, IN FULL REMISSION: ICD-10-CM

## 2024-08-30 DIAGNOSIS — F90.2 ATTENTION DEFICIT HYPERACTIVITY DISORDER (ADHD), COMBINED TYPE: ICD-10-CM

## 2024-08-30 PROCEDURE — 99999 PR PBB SHADOW E&M-EST. PATIENT-LVL III: CPT | Mod: PBBFAC,,, | Performed by: INTERNAL MEDICINE

## 2024-08-30 RX ORDER — SERTRALINE HYDROCHLORIDE 25 MG/1
25 TABLET, FILM COATED ORAL DAILY
Qty: 90 TABLET | Refills: 3 | Status: SHIPPED | OUTPATIENT
Start: 2024-08-30 | End: 2024-08-30

## 2024-08-30 RX ORDER — SERTRALINE HYDROCHLORIDE 25 MG/1
25 TABLET, FILM COATED ORAL DAILY
Qty: 90 TABLET | Refills: 3 | Status: SHIPPED | OUTPATIENT
Start: 2024-08-30 | End: 2025-08-30

## 2024-08-30 NOTE — PROGRESS NOTES
[facility] Note    Chief Complaint      Chief Complaint   Patient presents with    Annual Exam     Med check     History of Present Illness      Hayley Montana is a 54 y.o. female who presents today for chief complaint annual wellness .   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 She is back at home and it has been cleaned, feels mental status has cleared.   She endorses dusky feet post tib pulses bilat are 2+    Hm   MMG 5.24  Pap 8.23  Cscope 3.23    Active Problem List with Overview Notes    Diagnosis Date Noted    Forgetfulness 09/21/2021    Encounter for screening mammogram for malignant neoplasm of breast 09/21/2021    Back pain 09/21/2021    Skin lesion 09/21/2021    Venous insufficiency 04/26/2021    Low grade squamous intraepithelial lesion (LGSIL) on cervical Pap smear 01/07/2020     - 10/2019 PAP by Dr. Lance  - Dr. Lance following      Impaired fasting glucose 01/07/2020    History of herpes labialis 07/16/2019    Irritable bowel syndrome with diarrhea 07/16/2019    Attention deficit hyperactivity disorder (ADHD), combined type 07/12/2019    Anxiety 07/12/2019       Health Maintenance   Topic Date Due    Mammogram  05/23/2026    Lipid Panel  06/13/2028    TETANUS VACCINE  02/19/2029    Colorectal Cancer Screening  03/02/2033    Hepatitis C Screening  Completed    Shingles Vaccine  Completed       Past Medical History:   Diagnosis Date    ADHD (attention deficit hyperactivity disorder)     Anxiety     Complete rotator cuff tear or rupture of right shoulder, not specified as traumatic 1/12/2018    Depression     General anesthetics causing adverse effect in therapeutic use     delayed recovery       Past  Surgical History:   Procedure Laterality Date    BREAST SURGERY  2006    Lift     SECTION      x2    COLONOSCOPY  2018    Colonoscopy Dr. Wolf Marsh-- Recomended colonoscopy in 10 years.     COLONOSCOPY N/A 2023    Procedure: COLONOSCOPY;  Surgeon: Doroteo Kahn MD;  Location: [facility];  Service: Endoscopy;  Laterality: N/A;    COSMETIC SURGERY      breast lift tummy tuck    HYSTERECTOMY      Indication: Pain    SHOULDER ARTHROSCOPY      TONSILLECTOMY      TOTAL REDUCTION MAMMOPLASTY Bilateral 2006    breast lift    UPPER GASTROINTESTINAL ENDOSCOPY  2018    biopsy report in procedures       family history includes ADD / ADHD in her son; Asthma in her son; Cancer in her father; Dementia in her mother; Diabetes in her brother and father; Hernia in her sister; Hypertension in her mother; Neuropathy in her mother; No Known Problems in her daughter and sister; Pancreatic cancer in her father; Scleroderma in her sister; Thyroid disease in her sister.    Social History     Tobacco Use    Smoking status: Never     Passive exposure: Never    Smokeless tobacco: Never   Substance Use Topics    Alcohol use: Yes     Comment: Less than the options given    Drug use: No       Review of Systems   Constitutional:  Negative for chills, fever, malaise/fatigue and weight loss.   Respiratory:  Negative for cough, sputum production, shortness of breath and wheezing.    Cardiovascular:  Negative for chest pain, palpitations, orthopnea and leg swelling.   Gastrointestinal:  Negative for constipation, diarrhea, nausea and vomiting.   Genitourinary:  Negative for dysuria, frequency, hematuria and urgency.        Outpatient Encounter Medications as of 2024   Medication Sig Dispense Refill    fluticasone propionate (FLONASE) 50 mcg/actuation nasal spray 1 spray by Each Nostril route 2 (two) times daily.      lisdexamfetamine (VYVANSE) 40 MG Cap Take 1 capsule (40 mg total) by mouth once daily. 30 capsule  "0    [DISCONTINUED] buPROPion (WELLBUTRIN XL) 150 MG TB24 tablet Take 2 tablets (300 mg total) by mouth once daily. 180 tablet 1    sertraline (ZOLOFT) 25 MG tablet Take 1 tablet (25 mg total) by mouth once daily. 90 tablet 3    [DISCONTINUED] ciprofloxacin HCl (CIPRO) 500 MG tablet Take 1 tablet (500 mg total) by mouth every 12 (twelve) hours. (Patient not taking: Reported on 8/30/2024) 20 tablet 0    [DISCONTINUED] metroNIDAZOLE (FLAGYL) 500 MG tablet Take 1 tablet (500 mg total) by mouth every 8 (eight) hours. (Patient not taking: Reported on 8/30/2024) 30 tablet 0    [DISCONTINUED] sertraline (ZOLOFT) 25 MG tablet Take 1 tablet (25 mg total) by mouth once daily. 90 tablet 3     No facility-administered encounter medications on file as of 8/30/2024.        Review of patient's allergies indicates:  No Known Allergies        Physical Exam      Vital Signs  Pulse: 60  SpO2: 100 %  BP: 120/80  BP Location: Right arm  Patient Position: Sitting  Height and Weight  Height: 5' 8" (172.7 cm)  Weight: 84.9 kg (187 lb 2.7 oz)  BSA (Calculated - sq m): 2.02 sq meters  BMI (Calculated): 28.5  Weight in (lb) to have BMI = 25: 164.1]    Physical Exam  Vitals reviewed.   Constitutional:       General: She is not in acute distress.     Appearance: She is well-developed. She is not diaphoretic.   HENT:      Head: Normocephalic and atraumatic.      Right Ear: External ear normal.      Left Ear: External ear normal.      Nose: Nose normal.   Eyes:      General:         Right eye: No discharge.         Left eye: No discharge.      Conjunctiva/sclera: Conjunctivae normal.   Cardiovascular:      Rate and Rhythm: Normal rate and regular rhythm.      Heart sounds: Normal heart sounds.   Pulmonary:      Effort: Pulmonary effort is normal. No respiratory distress.      Breath sounds: Normal breath sounds.   Musculoskeletal:         General: Normal range of motion.      Cervical back: Normal range of motion.   Skin:     Coloration: Skin is " "not pale.      Findings: No rash.   Neurological:      Mental Status: She is alert and oriented to person, place, and time.   Psychiatric:         Behavior: Behavior normal.         Thought Content: Thought content normal.          Laboratory:  CBC:  No results for input(s): "WBC", "RBC", "HGB", "HCT", "PLT", "MCV", "MCH", "MCHC" in the last 2160 hours.  CMP:  No results for input(s): "GLU", "CALCIUM", "ALBUMIN", "PROT", "NA", "K", "CO2", "CL", "BUN", "ALKPHOS", "ALT", "AST", "BILITOT" in the last 2160 hours.    Invalid input(s): "CREATININ"  URINALYSIS:  No results for input(s): "COLORU", "CLARITYU", "SPECGRAV", "PHUR", "PROTEINUA", "GLUCOSEU", "BILIRUBINCON", "BLOODU", "WBCU", "RBCU", "BACTERIA", "MUCUS", "NITRITE", "LEUKOCYTESUR", "UROBILINOGEN", "HYALINECASTS" in the last 2160 hours.   LIPIDS:  No results for input(s): "TSH", "HDL", "CHOL", "TRIG", "LDLCALC", "CHOLHDL", "NONHDLCHOL", "TOTALCHOLEST" in the last 2160 hours.  TSH:  No results for input(s): "TSH" in the last 2160 hours.  A1C:  No results for input(s): "HGBA1C" in the last 2160 hours.    Radiology:      Assessment/Plan     Hayley Montana is a 54 y.o.female with:    1. Wellness examination  -     Hemoglobin A1C; Future; Expected date: 08/30/2024  -     Lipid Panel; Future; Expected date: 08/30/2024  -     Comprehensive Metabolic Panel; Future; Expected date: 08/30/2024  -     CBC Without Differential; Future; Expected date: 08/30/2024    2. Recurrent major depressive disorder, in full remission  -     Discontinue: sertraline (ZOLOFT) 25 MG tablet; Take 1 tablet (25 mg total) by mouth once daily.  Dispense: 90 tablet; Refill: 3  -     sertraline (ZOLOFT) 25 MG tablet; Take 1 tablet (25 mg total) by mouth once daily.  Dispense: 90 tablet; Refill: 3    3. Attention deficit hyperactivity disorder (ADHD), combined type  Assessment & Plan:   reviewed            Use of the [facility] Patient Portal discussed and encouraged during today's visit  -Continue " current medications and maintain follow up with specialists.  Return to clinic in 3 mos telemed .  No future appointments.    Christen Horton MD  8/30/2024 11:13 AM    Primary Care Internal Medicine

## 2024-09-24 DIAGNOSIS — F90.2 ATTENTION DEFICIT HYPERACTIVITY DISORDER (ADHD), COMBINED TYPE: ICD-10-CM

## 2024-09-24 RX ORDER — FLUTICASONE PROPIONATE 50 MCG
1 SPRAY, SUSPENSION (ML) NASAL 2 TIMES DAILY
Qty: 16 G | Refills: 1 | Status: SHIPPED | OUTPATIENT
Start: 2024-09-24

## 2024-09-24 RX ORDER — LISDEXAMFETAMINE DIMESYLATE 40 MG/1
40 CAPSULE ORAL DAILY
Qty: 30 CAPSULE | Refills: 0 | Status: SHIPPED | OUTPATIENT
Start: 2024-09-24

## 2024-10-08 ENCOUNTER — LAB VISIT (OUTPATIENT)
Dept: LAB | Facility: HOSPITAL | Age: 54
End: 2024-10-08
Attending: INTERNAL MEDICINE
Payer: COMMERCIAL

## 2024-10-08 ENCOUNTER — OFFICE VISIT (OUTPATIENT)
Dept: OTOLARYNGOLOGY | Facility: CLINIC | Age: 54
End: 2024-10-08
Payer: COMMERCIAL

## 2024-10-08 VITALS — WEIGHT: 191.38 LBS | HEIGHT: 68 IN | BODY MASS INDEX: 29.01 KG/M2

## 2024-10-08 DIAGNOSIS — R09.81 NASAL CONGESTION: ICD-10-CM

## 2024-10-08 DIAGNOSIS — Z00.00 WELLNESS EXAMINATION: ICD-10-CM

## 2024-10-08 DIAGNOSIS — J34.2 DNS (DEVIATED NASAL SEPTUM): Primary | ICD-10-CM

## 2024-10-08 DIAGNOSIS — R06.83 SNORING: ICD-10-CM

## 2024-10-08 LAB
ALBUMIN SERPL BCP-MCNC: 4 G/DL (ref 3.5–5.2)
ALP SERPL-CCNC: 89 U/L (ref 55–135)
ALT SERPL W/O P-5'-P-CCNC: 18 U/L (ref 10–44)
ANION GAP SERPL CALC-SCNC: 5 MMOL/L (ref 8–16)
AST SERPL-CCNC: 19 U/L (ref 10–40)
BILIRUB SERPL-MCNC: 0.6 MG/DL (ref 0.1–1)
BUN SERPL-MCNC: 14 MG/DL (ref 6–20)
CALCIUM SERPL-MCNC: 9.9 MG/DL (ref 8.7–10.5)
CHLORIDE SERPL-SCNC: 107 MMOL/L (ref 95–110)
CHOLEST SERPL-MCNC: 169 MG/DL (ref 120–199)
CHOLEST/HDLC SERPL: 2.8 {RATIO} (ref 2–5)
CO2 SERPL-SCNC: 30 MMOL/L (ref 23–29)
CREAT SERPL-MCNC: 0.7 MG/DL (ref 0.5–1.4)
ERYTHROCYTE [DISTWIDTH] IN BLOOD BY AUTOMATED COUNT: 12.3 % (ref 11.5–14.5)
EST. GFR  (NO RACE VARIABLE): >60 ML/MIN/1.73 M^2
ESTIMATED AVG GLUCOSE: 97 MG/DL (ref 68–131)
GLUCOSE SERPL-MCNC: 103 MG/DL (ref 70–110)
HBA1C MFR BLD: 5 % (ref 4–5.6)
HCT VFR BLD AUTO: 40 % (ref 37–48.5)
HDLC SERPL-MCNC: 60 MG/DL (ref 40–75)
HDLC SERPL: 35.5 % (ref 20–50)
HGB BLD-MCNC: 13 G/DL (ref 12–16)
LDLC SERPL CALC-MCNC: 101.4 MG/DL (ref 63–159)
MCH RBC QN AUTO: 31.6 PG (ref 27–31)
MCHC RBC AUTO-ENTMCNC: 32.5 G/DL (ref 32–36)
MCV RBC AUTO: 97 FL (ref 82–98)
NONHDLC SERPL-MCNC: 109 MG/DL
PLATELET # BLD AUTO: 306 K/UL (ref 150–450)
PMV BLD AUTO: 9.5 FL (ref 9.2–12.9)
POTASSIUM SERPL-SCNC: 5.2 MMOL/L (ref 3.5–5.1)
PROT SERPL-MCNC: 7.1 G/DL (ref 6–8.4)
RBC # BLD AUTO: 4.11 M/UL (ref 4–5.4)
SODIUM SERPL-SCNC: 142 MMOL/L (ref 136–145)
TRIGL SERPL-MCNC: 38 MG/DL (ref 30–150)
WBC # BLD AUTO: 6.1 K/UL (ref 3.9–12.7)

## 2024-10-08 PROCEDURE — 83036 HEMOGLOBIN GLYCOSYLATED A1C: CPT | Performed by: INTERNAL MEDICINE

## 2024-10-08 PROCEDURE — 3008F BODY MASS INDEX DOCD: CPT | Mod: CPTII,S$GLB,, | Performed by: STUDENT IN AN ORGANIZED HEALTH CARE EDUCATION/TRAINING PROGRAM

## 2024-10-08 PROCEDURE — 1159F MED LIST DOCD IN RCRD: CPT | Mod: CPTII,S$GLB,, | Performed by: STUDENT IN AN ORGANIZED HEALTH CARE EDUCATION/TRAINING PROGRAM

## 2024-10-08 PROCEDURE — 80053 COMPREHEN METABOLIC PANEL: CPT | Performed by: INTERNAL MEDICINE

## 2024-10-08 PROCEDURE — 99999 PR PBB SHADOW E&M-EST. PATIENT-LVL III: CPT | Mod: PBBFAC,,, | Performed by: STUDENT IN AN ORGANIZED HEALTH CARE EDUCATION/TRAINING PROGRAM

## 2024-10-08 PROCEDURE — 85027 COMPLETE CBC AUTOMATED: CPT | Performed by: INTERNAL MEDICINE

## 2024-10-08 PROCEDURE — 99214 OFFICE O/P EST MOD 30 MIN: CPT | Mod: S$GLB,,, | Performed by: STUDENT IN AN ORGANIZED HEALTH CARE EDUCATION/TRAINING PROGRAM

## 2024-10-08 PROCEDURE — 36415 COLL VENOUS BLD VENIPUNCTURE: CPT | Mod: PO | Performed by: INTERNAL MEDICINE

## 2024-10-08 PROCEDURE — 80061 LIPID PANEL: CPT | Performed by: INTERNAL MEDICINE

## 2024-10-08 RX ORDER — AZELASTINE 1 MG/ML
1 SPRAY, METERED NASAL 2 TIMES DAILY
Qty: 30 ML | Refills: 11 | Status: SHIPPED | OUTPATIENT
Start: 2024-10-08 | End: 2025-10-08

## 2024-10-08 NOTE — PROGRESS NOTES
Otolaryngology Clinic Note    Subjective:       Patient ID: Hayley Montana is a 54 y.o. female.    Chief Complaint: deviated  septum      History of Present Illness: Hayley Montana is a 54 y.o. female presenting with nasal concerns. Seen for URI  with DNS noted. Having more difficult breathing, worse on left. Playing pickle ball and harder to  breathe from nose. Has to use nasal sprays more. Using flonase. No sinus infections. She snores a bit. No sinus pressure. Just nasal obstruction. Has done flonase and oral antihistamines without much improvement.       Past Surgical History:   Procedure Laterality Date    BREAST SURGERY  2006    Lift     SECTION      x2    COLONOSCOPY  2018    Colonoscopy Dr. Wolf Marsh-- Recomended colonoscopy in 10 years.     COLONOSCOPY N/A 2023    Procedure: COLONOSCOPY;  Surgeon: Doroteo Kahn MD;  Location: [facility];  Service: Endoscopy;  Laterality: N/A;    COSMETIC SURGERY      breast lift tummy tuck    HYSTERECTOMY      Indication: Pain    SHOULDER ARTHROSCOPY      TONSILLECTOMY      TOTAL REDUCTION MAMMOPLASTY Bilateral 2006    breast lift    UPPER GASTROINTESTINAL ENDOSCOPY  2018    biopsy report in procedures     Past Medical History:   Diagnosis Date    ADHD (attention deficit hyperactivity disorder)     Anxiety     Complete rotator cuff tear or rupture of right shoulder, not specified as traumatic 2018    Depression     General anesthetics causing adverse effect in therapeutic use     delayed recovery     Social Drivers of Health     Tobacco Use: Low Risk  (2024)    Patient History     Smoking Tobacco Use: Never     Smokeless Tobacco Use: Never     Passive Exposure: Never   Alcohol Use: Not on file   Financial Resource Strain: Not on file   Food Insecurity: Not on file   Transportation Needs: Not on file   Physical Activity: Not on file   Stress: Not on file   Housing Stability: Not on file   Depression: Low Risk  (2024)     Depression     Last PHQ-4: Flowsheet Data: 0   Utilities: Not on file   Health Literacy: Not on file   Social Isolation: Not on file     Review of patient's allergies indicates:  No Known Allergies  Current Outpatient Medications   Medication Instructions    fluticasone propionate (FLONASE) 50 mcg, Each Nostril, 2 times daily    lisdexamfetamine (VYVANSE) 40 mg, Oral, Daily    sertraline (ZOLOFT) 25 mg, Oral, Daily       ENT ROS negative except as stated above.             Objective:      There were no vitals filed for this visit.    General: NAD, well appearing  Eyes: Normal conjunctiva and lids  Face: symmetric, nerve intact  Nose: The nose is without any evidence of any deformity. The nasal mucosa is inflamed and sticky. Scab lateral wall on right. The septum deviates left with spur on floor bl and spur mid septum on left. There is no evidence of septal hematoma. The turbinates are without abnormality.   Ears: The ears are with normal-appearing pinna. Examination of the canals is normal appearing bilaterally. There is no drainage or erythema noted. The tympanic membranes are normal appearing with pearly color, normal-appearing landmarks and normal light reflex. Hearing is grossly intact. De [place] Rosa midline, Rinne normal on left.   Mouth: No obvious abnormalities to the lips. The teeth are unremarkable. The gingivae are without any obvious evidence of infection or lesion. The oral mucosa is moist and pink. There are no obvious masses to the hard or soft palate.   Oropharynx: The uvula is midline.  The tongue is midline. The posterior pharynx is without erythema or exudate. The tonsils are normal appearing 1+.  Salivary glands: The salivary glands are symmetric and not enlarged, no masses  Neck: No lymphadenopathy, trachea midline, thryoid not enlarged.  Psych: Normal mood and affect.   Neuro: Grossly intact  Speech: fluent       Assessment and Plan:       1. DNS (deviated nasal septum)    2. Nasal congestion    3.  Snoring          She is bothered by her left nasal obstruction with DNS. Worsening. Has been doing nasal steroids without benefit and is getting nosebleeds. Has tried oral AH.   Reports slow to wake up with anesthesia.   I discussed the risks of septoplasty and turbinate reduction including persistent issues, bleeding, perforation, smell changes, injury to orbit, CSF leak / meningitis, external nasal changes, tearing abnormalities.     She will start astelin with flonase now.       RTC: 4 days post op splint removal    Plan of care was discussed in detail with the patient, who agreed with the plan as above. All questions were answered in detail.     Dea Tavarez MD  Otolaryngology

## 2024-10-30 ENCOUNTER — TELEPHONE (OUTPATIENT)
Dept: OTOLARYNGOLOGY | Facility: CLINIC | Age: 54
End: 2024-10-30
Payer: COMMERCIAL

## 2024-10-30 NOTE — TELEPHONE ENCOUNTER
Ok, thanks, can rebook at anytime if insurance changes or reaches out of pocket in future. She can continue nasal sprays for now.

## 2024-10-30 NOTE — TELEPHONE ENCOUNTER
----- Message from Damaris sent at 10/30/2024 10:52 AM CDT -----  Contact: Patient  Type:  Needs Medical Advice    Who Called:  Patient    Would the patient rather a call back or a response via [facility]?   Call back  Best Call Back Number:   [phone]    Additional Information:   States she would like to speak with someone about canceling her procedure scheduled for 11/13 - please call - thank you

## 2024-11-01 DIAGNOSIS — F90.2 ATTENTION DEFICIT HYPERACTIVITY DISORDER (ADHD), COMBINED TYPE: ICD-10-CM

## 2024-11-01 RX ORDER — LISDEXAMFETAMINE DIMESYLATE 40 MG/1
40 CAPSULE ORAL DAILY
Qty: 30 CAPSULE | Refills: 0 | Status: SHIPPED | OUTPATIENT
Start: 2024-11-01

## 2024-11-01 RX ORDER — FLUTICASONE PROPIONATE 50 MCG
1 SPRAY, SUSPENSION (ML) NASAL 2 TIMES DAILY
Qty: 16 G | Refills: 1 | Status: SHIPPED | OUTPATIENT
Start: 2024-11-01

## 2024-11-29 DIAGNOSIS — F90.2 ATTENTION DEFICIT HYPERACTIVITY DISORDER (ADHD), COMBINED TYPE: ICD-10-CM

## 2024-11-29 RX ORDER — AZELASTINE 1 MG/ML
1 SPRAY, METERED NASAL 2 TIMES DAILY
Qty: 30 ML | Refills: 11 | Status: SHIPPED | OUTPATIENT
Start: 2024-11-29 | End: 2025-11-29

## 2024-11-29 NOTE — TELEPHONE ENCOUNTER
No care due was identified.  Health [facility] Embedded Care Due Messages. Reference number: [id number].   11/29/2024 6:24:07 AM CST

## 2024-12-02 RX ORDER — FLUTICASONE PROPIONATE 50 MCG
1 SPRAY, SUSPENSION (ML) NASAL 2 TIMES DAILY
Qty: 16 G | Refills: 1 | Status: SHIPPED | OUTPATIENT
Start: 2024-12-02

## 2024-12-02 RX ORDER — LISDEXAMFETAMINE DIMESYLATE 40 MG/1
40 CAPSULE ORAL DAILY
Qty: 30 CAPSULE | Refills: 0 | Status: SHIPPED | OUTPATIENT
Start: 2024-12-02

## 2024-12-26 ENCOUNTER — PATIENT MESSAGE (OUTPATIENT)
Dept: PRIMARY CARE CLINIC | Facility: CLINIC | Age: 54
End: 2024-12-26
Payer: COMMERCIAL

## 2024-12-26 DIAGNOSIS — F90.2 ATTENTION DEFICIT HYPERACTIVITY DISORDER (ADHD), COMBINED TYPE: ICD-10-CM

## 2024-12-26 RX ORDER — AZELASTINE 1 MG/ML
1 SPRAY, METERED NASAL 2 TIMES DAILY
Qty: 30 ML | Refills: 11 | Status: SHIPPED | OUTPATIENT
Start: 2024-12-26 | End: 2025-12-26

## 2024-12-26 NOTE — TELEPHONE ENCOUNTER
No care due was identified.  Health [facility] Embedded Care Due Messages. Reference number: [id number].   12/26/2024 4:28:56 AM CST

## 2024-12-27 RX ORDER — LISDEXAMFETAMINE DIMESYLATE 40 MG/1
40 CAPSULE ORAL DAILY
Qty: 30 CAPSULE | Refills: 0 | Status: SHIPPED | OUTPATIENT
Start: 2024-12-27

## 2024-12-27 RX ORDER — FLUTICASONE PROPIONATE 50 MCG
1 SPRAY, SUSPENSION (ML) NASAL 2 TIMES DAILY
Qty: 16 G | Refills: 1 | Status: SHIPPED | OUTPATIENT
Start: 2024-12-27

## 2025-02-04 PROBLEM — R73.01 IMPAIRED FASTING GLUCOSE: Status: RESOLVED | Noted: 2020-01-07 | Resolved: 2025-02-04

## 2025-02-04 PROBLEM — M54.9 BACK PAIN: Status: RESOLVED | Noted: 2021-09-21 | Resolved: 2025-02-04

## 2025-02-04 PROBLEM — R68.89 FORGETFULNESS: Status: RESOLVED | Noted: 2021-09-21 | Resolved: 2025-02-04

## 2025-05-07 ENCOUNTER — TELEPHONE (OUTPATIENT)
Facility: CLINIC | Age: 55
End: 2025-05-07
Payer: COMMERCIAL

## 2025-05-07 NOTE — TELEPHONE ENCOUNTER
I spoke with the patient, informed her that she needed imaging and a referral to see Dr. Hermosillo. The patient said she wasn't sure if she had venous insufficiency or peripheral arterial disease, and I explained that he would want imaging because he wouldn't be able to determine what he was treating without it. The patient then acts very rudely and asks why she is unable to see a doctor because she has a vein problem. I told her that Dr. Hermosillo does not treat veins.    ----- Message from Cannonballlorraine sent at 5/7/2025  1:26 PM CDT -----  Type:  Sooner Appointment Request Patient is requesting a sooner appointment.  Patient declined first available appointment listed as well as another facility and provider .  Patient will not accept being placed on the waitlist and is requesting a message be sent to doctor. Name of Caller: PtWhen is the first available appointment? noneSymptoms: Blood pooling in both anklesWould the patient rather a call back or a response via [facility]? Call backBest Call Back Number: Telephone Information:Mobile          [phone] Information:

## (undated) DEVICE — Device

## (undated) DEVICE — BLADE GATOR 4.2

## (undated) DEVICE — DRAPE PLASTIC U 60X72

## (undated) DEVICE — GAUZE SPONGE 4X4 12PLY

## (undated) DEVICE — GLOVE BIOGEL SKINSENSE PI 7.0

## (undated) DEVICE — SET EXTENSION 30 IN W/LL ROLLE

## (undated) DEVICE — UNDERGLOVES BIOGEL PI SZ 7 LF

## (undated) DEVICE — SEE MEDLINE ITEM 152530

## (undated) DEVICE — DRAPE STERI INSTRUMENT 1018

## (undated) DEVICE — SOL NACL IRR 3000ML

## (undated) DEVICE — DRAPE INCISE IOBAN 2 23X17IN

## (undated) DEVICE — SEE MEDLINE ITEM 157150

## (undated) DEVICE — SEE MEDLINE ITEM 152622

## (undated) DEVICE — KIT TRACTION SHLDR ST DISP LF

## (undated) DEVICE — WAND COBLATION TURBOVAC 90

## (undated) DEVICE — NDL ARTHSCP MF SCORPION

## (undated) DEVICE — TUBE SET INFLOW/OUTFLOW

## (undated) DEVICE — NDL SUREFIRE SCORPION RC

## (undated) DEVICE — SEE MEDLINE ITEM 152529

## (undated) DEVICE — PAD ABD 8X10 STERILE

## (undated) DEVICE — SEE MEDLINE ITEM 157131

## (undated) DEVICE — GLOVE BIOGEL SKINSENSE PI 7.5

## (undated) DEVICE — TAPE SURG MEDIPORE 6X72IN

## (undated) DEVICE — PAD SHOULDER CARE POLAR

## (undated) DEVICE — GLOVE BIOGEL SKINSENSE PI 8.0

## (undated) DEVICE — TAPE MEDIPORE 4IN X 2YDS

## (undated) DEVICE — APPLICATOR CHLORAPREP ORN 26ML

## (undated) DEVICE — KIT EVACUATOR 3-SPRING 1/8 DRN

## (undated) DEVICE — SUT FIBERWIRE

## (undated) DEVICE — DRESSING XEROFORM FOIL PK 1X8

## (undated) DEVICE — SOL 9P NACL IRR PIC IL

## (undated) DEVICE — SUT PROLENE 3-0 FS-1 MONO18

## (undated) DEVICE — CANNULA ARTHROSCOPIC

## (undated) DEVICE — ALCOHOL 70% ISOP W/GREEN 16OZ

## (undated) DEVICE — SUPPORT SLING SHOT II MEDIUM

## (undated) DEVICE — SUT PDS II 0 CT VIL MONO 36

## (undated) DEVICE — SUT FIBERWIRE #5 1/2 X 38

## (undated) DEVICE — DRAPE STERI U-SHAPED 47X51IN

## (undated) DEVICE — BLADE SHAVER 4.5 6/BX

## (undated) DEVICE — PASSER SUTURE LASSO STRGHT 90